# Patient Record
Sex: MALE | Race: WHITE | NOT HISPANIC OR LATINO | URBAN - METROPOLITAN AREA
[De-identification: names, ages, dates, MRNs, and addresses within clinical notes are randomized per-mention and may not be internally consistent; named-entity substitution may affect disease eponyms.]

---

## 2017-12-01 ENCOUNTER — OUTPATIENT (OUTPATIENT)
Dept: OUTPATIENT SERVICES | Facility: HOSPITAL | Age: 47
LOS: 1 days | End: 2017-12-01
Payer: MEDICAID

## 2017-12-01 PROCEDURE — G9001: CPT

## 2017-12-07 ENCOUNTER — EMERGENCY (EMERGENCY)
Facility: HOSPITAL | Age: 47
LOS: 1 days | Discharge: HOME | End: 2017-12-07
Attending: EMERGENCY MEDICINE | Admitting: EMERGENCY MEDICINE
Payer: COMMERCIAL

## 2017-12-07 VITALS
WEIGHT: 205.03 LBS | TEMPERATURE: 97 F | HEART RATE: 64 BPM | RESPIRATION RATE: 12 BRPM | DIASTOLIC BLOOD PRESSURE: 76 MMHG | HEIGHT: 68 IN | SYSTOLIC BLOOD PRESSURE: 121 MMHG | OXYGEN SATURATION: 97 %

## 2017-12-07 LAB
ALBUMIN SERPL ELPH-MCNC: 2.9 G/DL — LOW (ref 3.3–5)
ALP SERPL-CCNC: 133 U/L — HIGH (ref 40–120)
ALT FLD-CCNC: 27 U/L — SIGNIFICANT CHANGE UP (ref 12–78)
ANION GAP SERPL CALC-SCNC: 13 MMOL/L — SIGNIFICANT CHANGE UP (ref 5–17)
APTT BLD: 39.7 SEC — HIGH (ref 27.5–37.4)
AST SERPL-CCNC: 77 U/L — HIGH (ref 15–37)
BASOPHILS # BLD AUTO: 0.2 K/UL — SIGNIFICANT CHANGE UP (ref 0–0.2)
BASOPHILS NFR BLD AUTO: 1.8 % — SIGNIFICANT CHANGE UP (ref 0–2)
BILIRUB SERPL-MCNC: 2.5 MG/DL — HIGH (ref 0.2–1.2)
BUN SERPL-MCNC: 14 MG/DL — SIGNIFICANT CHANGE UP (ref 7–23)
CALCIUM SERPL-MCNC: 8.5 MG/DL — SIGNIFICANT CHANGE UP (ref 8.5–10.1)
CHLORIDE SERPL-SCNC: 98 MMOL/L — SIGNIFICANT CHANGE UP (ref 96–108)
CK MB BLD-MCNC: 1.6 % — SIGNIFICANT CHANGE UP (ref 0–3.5)
CK MB CFR SERPL CALC: 2 NG/ML — SIGNIFICANT CHANGE UP (ref 0–3.6)
CK SERPL-CCNC: 126 U/L — SIGNIFICANT CHANGE UP (ref 26–308)
CO2 SERPL-SCNC: 21 MMOL/L — LOW (ref 22–31)
CREAT SERPL-MCNC: 0.9 MG/DL — SIGNIFICANT CHANGE UP (ref 0.5–1.3)
EOSINOPHIL # BLD AUTO: 0 K/UL — SIGNIFICANT CHANGE UP (ref 0–0.5)
EOSINOPHIL NFR BLD AUTO: 0.4 % — SIGNIFICANT CHANGE UP (ref 0–6)
ETHANOL SERPL-MCNC: 228 MG/DL — HIGH (ref 0–10)
GLUCOSE SERPL-MCNC: 72 MG/DL — SIGNIFICANT CHANGE UP (ref 70–99)
HCT VFR BLD CALC: 44 % — SIGNIFICANT CHANGE UP (ref 39–50)
HGB BLD-MCNC: 14.8 G/DL — SIGNIFICANT CHANGE UP (ref 13–17)
INR BLD: 1.38 RATIO — HIGH (ref 0.88–1.16)
LIDOCAIN IGE QN: 275 U/L — SIGNIFICANT CHANGE UP (ref 73–393)
LYMPHOCYTES # BLD AUTO: 1.9 K/UL — SIGNIFICANT CHANGE UP (ref 1–3.3)
LYMPHOCYTES # BLD AUTO: 18.5 % — SIGNIFICANT CHANGE UP (ref 13–44)
MAGNESIUM SERPL-MCNC: 1.8 MG/DL — SIGNIFICANT CHANGE UP (ref 1.6–2.6)
MCHC RBC-ENTMCNC: 31.8 PG — SIGNIFICANT CHANGE UP (ref 27–34)
MCHC RBC-ENTMCNC: 33.5 GM/DL — SIGNIFICANT CHANGE UP (ref 32–36)
MCV RBC AUTO: 94.8 FL — SIGNIFICANT CHANGE UP (ref 80–100)
MONOCYTES # BLD AUTO: 0.6 K/UL — SIGNIFICANT CHANGE UP (ref 0–0.9)
MONOCYTES NFR BLD AUTO: 5.4 % — SIGNIFICANT CHANGE UP (ref 1–9)
NEUTROPHILS # BLD AUTO: 7.7 K/UL — HIGH (ref 1.8–7.4)
NEUTROPHILS NFR BLD AUTO: 73.9 % — SIGNIFICANT CHANGE UP (ref 43–77)
PLATELET # BLD AUTO: 252 K/UL — SIGNIFICANT CHANGE UP (ref 150–400)
POTASSIUM SERPL-MCNC: 4.7 MMOL/L — SIGNIFICANT CHANGE UP (ref 3.5–5.3)
POTASSIUM SERPL-SCNC: 4.7 MMOL/L — SIGNIFICANT CHANGE UP (ref 3.5–5.3)
PROT SERPL-MCNC: 9.4 G/DL — HIGH (ref 6–8.3)
PROTHROM AB SERPL-ACNC: 15.1 SEC — HIGH (ref 9.8–12.7)
RBC # BLD: 4.65 M/UL — SIGNIFICANT CHANGE UP (ref 4.2–5.8)
RBC # FLD: 13.6 % — SIGNIFICANT CHANGE UP (ref 10.3–14.5)
SODIUM SERPL-SCNC: 132 MMOL/L — LOW (ref 135–145)
TROPONIN I SERPL-MCNC: <.015 NG/ML — SIGNIFICANT CHANGE UP (ref 0.01–0.04)
WBC # BLD: 10.4 K/UL — SIGNIFICANT CHANGE UP (ref 3.8–10.5)
WBC # FLD AUTO: 10.4 K/UL — SIGNIFICANT CHANGE UP (ref 3.8–10.5)

## 2017-12-07 PROCEDURE — 83735 ASSAY OF MAGNESIUM: CPT

## 2017-12-07 PROCEDURE — 82553 CREATINE MB FRACTION: CPT

## 2017-12-07 PROCEDURE — 93005 ELECTROCARDIOGRAM TRACING: CPT

## 2017-12-07 PROCEDURE — 99283 EMERGENCY DEPT VISIT LOW MDM: CPT | Mod: 25

## 2017-12-07 PROCEDURE — 80307 DRUG TEST PRSMV CHEM ANLYZR: CPT

## 2017-12-07 PROCEDURE — 80053 COMPREHEN METABOLIC PANEL: CPT

## 2017-12-07 PROCEDURE — 85027 COMPLETE CBC AUTOMATED: CPT

## 2017-12-07 PROCEDURE — 85610 PROTHROMBIN TIME: CPT

## 2017-12-07 PROCEDURE — 82550 ASSAY OF CK (CPK): CPT

## 2017-12-07 PROCEDURE — 99284 EMERGENCY DEPT VISIT MOD MDM: CPT

## 2017-12-07 PROCEDURE — 71020: CPT | Mod: 26

## 2017-12-07 PROCEDURE — 83690 ASSAY OF LIPASE: CPT

## 2017-12-07 PROCEDURE — 71046 X-RAY EXAM CHEST 2 VIEWS: CPT

## 2017-12-07 PROCEDURE — 84484 ASSAY OF TROPONIN QUANT: CPT

## 2017-12-07 PROCEDURE — 85730 THROMBOPLASTIN TIME PARTIAL: CPT

## 2017-12-07 RX ORDER — SODIUM CHLORIDE 9 MG/ML
1000 INJECTION INTRAMUSCULAR; INTRAVENOUS; SUBCUTANEOUS ONCE
Qty: 0 | Refills: 0 | Status: COMPLETED | OUTPATIENT
Start: 2017-12-07 | End: 2017-12-07

## 2017-12-07 RX ADMIN — SODIUM CHLORIDE 1000 MILLILITER(S): 9 INJECTION INTRAMUSCULAR; INTRAVENOUS; SUBCUTANEOUS at 21:41

## 2017-12-07 NOTE — ED PROVIDER NOTE - CARE PLAN
Principal Discharge DX:	Shortness of breath Principal Discharge DX:	Shortness of breath  Secondary Diagnosis:	Weakness generalized Principal Discharge DX:	Shortness of breath  Secondary Diagnosis:	Weakness generalized  Secondary Diagnosis:	ETOH abuse

## 2017-12-07 NOTE — ED PROVIDER NOTE - OBJECTIVE STATEMENT
46 yo male hx of alcoholic cirrhosis, last drink 11am c/o generalized weakness shortness of breath.  No fever/chills.  No nausea/vomiting/abdominal pain.  No withdrawal symptoms.  PMD Breezy Maza.

## 2017-12-07 NOTE — ED ADULT NURSE NOTE - OBJECTIVE STATEMENT
@2139 Received and assumed care of pt at this time aaox3 in no obvious or acute distress. pt is a 46 y/o  m with pmhx of etoh abuse/dependence ,cirrhosis presents to ed walk in c/o difficulties breathing. pt also reports that he has been binge drinking for the past few weeks stating " I think I may be dehydrated" - last drink at 11am- no signs of withdrawals denies chest pain, dizziness, vomiting, fever, chills, recent travel, sick contacts, calf pain/stiffness or any other distress. seen and evaluated by dr. Cedeno. iv established with labs collected & sent, ivf's infusing, cxr & ekg pending, will continue to monitor

## 2017-12-07 NOTE — ED ADULT NURSE NOTE - PMH
Ascites due to alcoholic cirrhosis    Cirrhosis of liver    Esophageal varices with bleeding  7/2017  ETOH abuse    HTN (hypertension)    IBS (irritable bowel syndrome)

## 2017-12-07 NOTE — ED ADULT TRIAGE NOTE - CHIEF COMPLAINT QUOTE
patient with complain of difficulty breathing and weakness with hx of alcohol abuse, admits to drinking today

## 2017-12-07 NOTE — ED PROVIDER NOTE - PROGRESS NOTE DETAILS
labs and imaging explained to patient, states he feels much better, wants to go home and f/u with PMD.  Asked if he wants to go to rehab for his drinking, patient declined states he will "take care of it himself"

## 2017-12-08 VITALS
TEMPERATURE: 98 F | SYSTOLIC BLOOD PRESSURE: 102 MMHG | OXYGEN SATURATION: 97 % | DIASTOLIC BLOOD PRESSURE: 76 MMHG | HEART RATE: 66 BPM | RESPIRATION RATE: 16 BRPM

## 2017-12-11 DIAGNOSIS — R69 ILLNESS, UNSPECIFIED: ICD-10-CM

## 2018-04-01 ENCOUNTER — OUTPATIENT (OUTPATIENT)
Dept: OUTPATIENT SERVICES | Facility: HOSPITAL | Age: 48
LOS: 1 days | End: 2018-04-01
Payer: MEDICAID

## 2018-04-01 PROCEDURE — G9001: CPT

## 2018-04-05 DIAGNOSIS — R69 ILLNESS, UNSPECIFIED: ICD-10-CM

## 2019-02-22 PROBLEM — K70.31 ALCOHOLIC CIRRHOSIS OF LIVER WITH ASCITES: Chronic | Status: ACTIVE | Noted: 2017-12-07

## 2019-02-22 PROBLEM — K58.9 IRRITABLE BOWEL SYNDROME WITHOUT DIARRHEA: Chronic | Status: ACTIVE | Noted: 2017-12-07

## 2019-02-22 PROBLEM — I85.01 ESOPHAGEAL VARICES WITH BLEEDING: Chronic | Status: ACTIVE | Noted: 2017-12-07

## 2019-02-22 PROBLEM — I10 ESSENTIAL (PRIMARY) HYPERTENSION: Chronic | Status: ACTIVE | Noted: 2017-12-07

## 2019-02-22 PROBLEM — K74.60 UNSPECIFIED CIRRHOSIS OF LIVER: Chronic | Status: ACTIVE | Noted: 2017-12-07

## 2019-02-22 PROBLEM — F10.10 ALCOHOL ABUSE, UNCOMPLICATED: Chronic | Status: ACTIVE | Noted: 2017-12-07

## 2019-02-22 PROBLEM — Z00.00 ENCOUNTER FOR PREVENTIVE HEALTH EXAMINATION: Status: ACTIVE | Noted: 2019-02-22

## 2019-02-26 ENCOUNTER — LABORATORY RESULT (OUTPATIENT)
Age: 49
End: 2019-02-26

## 2019-02-26 ENCOUNTER — APPOINTMENT (OUTPATIENT)
Dept: HEPATOLOGY | Facility: CLINIC | Age: 49
End: 2019-02-26
Payer: MEDICAID

## 2019-02-26 VITALS
HEART RATE: 73 BPM | DIASTOLIC BLOOD PRESSURE: 85 MMHG | WEIGHT: 210 LBS | OXYGEN SATURATION: 94 % | RESPIRATION RATE: 16 BRPM | SYSTOLIC BLOOD PRESSURE: 129 MMHG | BODY MASS INDEX: 33.75 KG/M2 | HEIGHT: 66 IN | TEMPERATURE: 98.2 F

## 2019-02-26 DIAGNOSIS — Z80.1 FAMILY HISTORY OF MALIGNANT NEOPLASM OF TRACHEA, BRONCHUS AND LUNG: ICD-10-CM

## 2019-02-26 DIAGNOSIS — K21.9 GASTRO-ESOPHAGEAL REFLUX DISEASE W/OUT ESOPHAGITIS: ICD-10-CM

## 2019-02-26 DIAGNOSIS — Z82.49 FAMILY HISTORY OF ISCHEMIC HEART DISEASE AND OTHER DISEASES OF THE CIRCULATORY SYSTEM: ICD-10-CM

## 2019-02-26 DIAGNOSIS — Z82.3 FAMILY HISTORY OF STROKE: ICD-10-CM

## 2019-02-26 DIAGNOSIS — Z87.19 PERSONAL HISTORY OF OTHER DISEASES OF THE DIGESTIVE SYSTEM: ICD-10-CM

## 2019-02-26 DIAGNOSIS — I10 ESSENTIAL (PRIMARY) HYPERTENSION: ICD-10-CM

## 2019-02-26 PROCEDURE — 99203 OFFICE O/P NEW LOW 30 MIN: CPT

## 2019-02-26 NOTE — HISTORY OF PRESENT ILLNESS
[de-identified] : Mr. Mancini is a 49 yo  M with decompensated alcoholic cirrhosis, who presents to [facility] liver care. He was first diagnosed with cirrhosis in 1/2017. He was previously followed by gastroenterologist Dr. Nahun Neely, but his insurance no longer covers those visits and he has not been seen by any GI/Liver specialist for >1 year (last visit around 2/2018). His cirrhosis is complicated by a history of esophageal variceal hemorrhage in 7/2017 that required a 2-week ICU stay. After being discharged, he underwent EGD with EVL by Dr. Neely in 8/2017 but has not had another surveillance EGD since then to his recollection. He has not had any further overt GI bleeding since then. His cirrhosis is also complicated by a history of ascites controlled with diuretics. He denies any history of paracentesis or SBP in the past. He has no prior known history of PSE but Dr. Neely started him on Xifaxan in the past. He is not taking lactulose or Miralax. He has no prior known history of PVT or HCC but his last liver imaging was >1 year ago and none is currently available for me to review.\par \par He has a long history of alcohol abuse and dependence. He previously drank a 12-pack of beer daily as well as occasional shots of hard liquor. He reports [place] [facility] [place] well as two prior voluntary outpatient rehab programs (last in 2017). He has also attended AA meetings in the past, last 3 months ago. He reports that he was sober from alcohol for the past 1.5 years (his longest period of sobriety) until early January 2019, when he had an alcohol relapse. The relapse was provoked by financial stressors, particularly he reports that there was concern at the time that he may lose his housing. He currently lives with his sister. He reports that when he relapsed, he stopped taking all of his home medications and was drinking half a 750-mL bottle of vodka daily.\par \par Shortly after he re-started drinking alcohol in 1/2019, his eyes and skin became yellow and he also noted malaise, nausea, fatigue, and abdominal distension/bloating with associated early satiety. His urine also became dark in color. He became worried by these symptoms and quit drinking alcohol 1 week ago. He also re-started his prior home medications at that time. He does feel that his symptoms have all been gradually improving over the last few days since then. He initially was very tremulous when he quit drinking and still has mild tremors in his hands, but feels he is improving overall. He denies any confusion or hallucinations.\par \par No prior colonoscopy. No known FH of liver disease, HCC, or colon cancer/polyps.

## 2019-02-26 NOTE — REVIEW OF SYSTEMS
[Feeling Poorly] : feeling poorly [Feeling Tired] : feeling tired [Heartburn] : heartburn [As Noted in HPI] : as noted in HPI [Negative] : Heme/Lymph [FreeTextEntry3] : yellowing of eyes [FreeTextEntry4] : y [de-identified] : yellowing of skin

## 2019-02-26 NOTE — ASSESSMENT
[FreeTextEntry1] : 47 yo M with alcohol abuse/dependence with recent relapse and now in early remission (reportedly quit 1 week ago), with decompensated alcoholic cirrhosis complicated by ascites and prior esophageal variceal hemorrhage (7/2017), with no prior known history of SBP, PSE, PVT, or HCC.\par \par He had recent onset of jaundice and increased ascites, likely related to alcoholic steatohepatitis from recent alcohol relapse superimposed on his underlying cirrhosis. His symptoms have been gradually improving since he quit drinking 1 week ago and re-started his home medications. Nevertheless, will check labs today to assess current liver function as well as renal function. If his labs are suggestive of severe alcoholic hepatitis then he may benefit from a course of prednisolone therapy to try to help his liver recovery. We also discussed at length the importance of re-establishing and maintaining 100% alcohol sobriety. He was strongly advised to re-start attending AA meetings and also to enroll in another formal rehabilitation program.\par \par # Ascites:  Moderate and non-tense on exam. No signs of infection currently but will follow-up lab results. If considering prednisolone then he would first need diagnostic paracentesis to rule out SBP. Otherwise, if no plans for steroids, then will titrate his diuretic therapy as possible pending his lab results. He was advised to adhere to a <2g Na diet.\par \par # History of secondary esophageal varices with bleeding:  No recent overt bleeding but is overdue for surveillance. Portal hypertension also likely increased by recent relapse. Ordered EGD for surveillance. Meanwhile, will continue secondary prophylaxis with nadolol.\par \par # Possible PSE:\par - Had AMS during his prior hospitalization when he had EVB in 7/2017, but has not had any overt PSE since then.\par - Continue Xifaxan for now; defer lactulose for now.\par \par # HCC surveillance:  Overdue with last imaging >1 year ago and none available for review. Discussed need for q6 month surveillance. Ordered US and AFP today.\par \par # Health maintenance:\par - Mr. AGUILAR was counseled to: abstain from alcohol and all illicit drugs; avoid use of herbal and dietary supplements due to potential hepatotoxicity; limit use of acetaminophen to <2 grams per day; avoid use of nonsteroidal antiinflammatory drugs (NSAIDs) as these can precipitate renal dysfunction in patients with advanced liver disease; avoid eating any unpasteurized dairy products; and avoid eating raw/steamed oysters or other shellfish to avoid risk of Vibrio infection.\par \par He will return to clinic for follow-up in 1 month.

## 2019-02-28 LAB
AFP-TM SERPL-MCNC: 5 NG/ML
ALBUMIN SERPL ELPH-MCNC: 2.5 G/DL
ALP BLD-CCNC: 130 U/L
ALT SERPL-CCNC: 41 U/L
ANION GAP SERPL CALC-SCNC: 17 MMOL/L
AST SERPL-CCNC: 164 U/L
BASOPHILS # BLD AUTO: 0.1 K/UL
BASOPHILS NFR BLD AUTO: 1 %
BILIRUB DIRECT SERPL-MCNC: 7.7 MG/DL
BILIRUB SERPL-MCNC: 13 MG/DL
BUN SERPL-MCNC: 8 MG/DL
CALCIUM SERPL-MCNC: 8.2 MG/DL
CHLORIDE SERPL-SCNC: 98 MMOL/L
CO2 SERPL-SCNC: 24 MMOL/L
CREAT SERPL-MCNC: 0.61 MG/DL
EOSINOPHIL # BLD AUTO: 0.16 K/UL
EOSINOPHIL NFR BLD AUTO: 1.6 %
GLUCOSE SERPL-MCNC: 102 MG/DL
HBV CORE IGG+IGM SER QL: NONREACTIVE
HBV SURFACE AB SERPL IA-ACNC: <3 MIU/ML
HBV SURFACE AG SER QL: NONREACTIVE
HCT VFR BLD CALC: 36 %
HCV AB SER QL: NONREACTIVE
HCV S/CO RATIO: 0.22 S/CO
HEPATITIS A IGG ANTIBODY: REACTIVE
HGB BLD-MCNC: 12.2 G/DL
IMM GRANULOCYTES NFR BLD AUTO: 0.8 %
INR PPP: 2.03 RATIO
LYMPHOCYTES # BLD AUTO: 1.6 K/UL
LYMPHOCYTES NFR BLD AUTO: 16.3 %
MAN DIFF?: NORMAL
MCHC RBC-ENTMCNC: 33.9 GM/DL
MCHC RBC-ENTMCNC: 36.7 PG
MCV RBC AUTO: 108.4 FL
MONOCYTES # BLD AUTO: 1.12 K/UL
MONOCYTES NFR BLD AUTO: 11.4 %
NEUTROPHILS # BLD AUTO: 6.74 K/UL
NEUTROPHILS NFR BLD AUTO: 68.9 %
PLATELET # BLD AUTO: NORMAL K/UL
POTASSIUM SERPL-SCNC: 3.3 MMOL/L
PROT SERPL-MCNC: 8.3 G/DL
PT BLD: 23.6 SEC
RBC # BLD: 3.32 M/UL
RBC # FLD: 15.8 %
SODIUM SERPL-SCNC: 139 MMOL/L
WBC # FLD AUTO: 9.8 K/UL

## 2019-03-07 ENCOUNTER — RESULT REVIEW (OUTPATIENT)
Age: 49
End: 2019-03-07

## 2019-03-07 ENCOUNTER — OUTPATIENT (OUTPATIENT)
Dept: OUTPATIENT SERVICES | Facility: HOSPITAL | Age: 49
LOS: 1 days | End: 2019-03-07
Payer: MEDICAID

## 2019-03-07 DIAGNOSIS — K70.31 ALCOHOLIC CIRRHOSIS OF LIVER WITH ASCITES: ICD-10-CM

## 2019-03-07 LAB
ALBUMIN FLD-MCNC: 0.3 G/DL — SIGNIFICANT CHANGE UP
B PERT IGG+IGM PNL SER: ABNORMAL
COLOR FLD: YELLOW — SIGNIFICANT CHANGE UP
FLUID INTAKE SUBSTANCE CLASS: SIGNIFICANT CHANGE UP
FLUID SEGMENTED GRANULOCYTES: 1 % — SIGNIFICANT CHANGE UP
GLUCOSE FLD-MCNC: 97 MG/DL — SIGNIFICANT CHANGE UP
LDH SERPL L TO P-CCNC: 49 U/L — SIGNIFICANT CHANGE UP
LYMPHOCYTES # FLD: 73 % — SIGNIFICANT CHANGE UP
MESOTHL CELL # FLD: 18 % — SIGNIFICANT CHANGE UP
MONOS+MACROS # FLD: 8 % — SIGNIFICANT CHANGE UP
PROT FLD-MCNC: 1.1 G/DL — SIGNIFICANT CHANGE UP
RCV VOL RI: 530 /UL — HIGH (ref 0–5)
TOTAL NUCLEATED CELL COUNT, BODY FLUID: 21 /UL — HIGH (ref 0–5)
TUBE TYPE: SIGNIFICANT CHANGE UP

## 2019-03-07 PROCEDURE — 88313 SPECIAL STAINS GROUP 2: CPT

## 2019-03-07 PROCEDURE — 87205 SMEAR GRAM STAIN: CPT

## 2019-03-07 PROCEDURE — 87070 CULTURE OTHR SPECIMN AEROBIC: CPT

## 2019-03-07 PROCEDURE — 88112 CYTOPATH CELL ENHANCE TECH: CPT | Mod: 26

## 2019-03-07 PROCEDURE — 75970 VASCULAR BIOPSY: CPT

## 2019-03-07 PROCEDURE — C1887: CPT

## 2019-03-07 PROCEDURE — 37200 TRANSCATHETER BIOPSY: CPT

## 2019-03-07 PROCEDURE — 85027 COMPLETE CBC AUTOMATED: CPT

## 2019-03-07 PROCEDURE — C1894: CPT

## 2019-03-07 PROCEDURE — 87116 MYCOBACTERIA CULTURE: CPT

## 2019-03-07 PROCEDURE — 83615 LACTATE (LD) (LDH) ENZYME: CPT

## 2019-03-07 PROCEDURE — 88313 SPECIAL STAINS GROUP 2: CPT | Mod: 26

## 2019-03-07 PROCEDURE — C1729: CPT

## 2019-03-07 PROCEDURE — 89051 BODY FLUID CELL COUNT: CPT

## 2019-03-07 PROCEDURE — 88305 TISSUE EXAM BY PATHOLOGIST: CPT

## 2019-03-07 PROCEDURE — 87102 FUNGUS ISOLATION CULTURE: CPT

## 2019-03-07 PROCEDURE — 82945 GLUCOSE OTHER FLUID: CPT

## 2019-03-07 PROCEDURE — 87015 SPECIMEN INFECT AGNT CONCNTJ: CPT

## 2019-03-07 PROCEDURE — 84157 ASSAY OF PROTEIN OTHER: CPT

## 2019-03-07 PROCEDURE — 82042 OTHER SOURCE ALBUMIN QUAN EA: CPT

## 2019-03-07 PROCEDURE — 87075 CULTR BACTERIA EXCEPT BLOOD: CPT

## 2019-03-07 PROCEDURE — 76937 US GUIDE VASCULAR ACCESS: CPT

## 2019-03-07 PROCEDURE — 75970 VASCULAR BIOPSY: CPT | Mod: 26

## 2019-03-07 PROCEDURE — 87206 SMEAR FLUORESCENT/ACID STAI: CPT

## 2019-03-07 PROCEDURE — 36011 PLACE CATHETER IN VEIN: CPT

## 2019-03-07 PROCEDURE — 88305 TISSUE EXAM BY PATHOLOGIST: CPT | Mod: 26

## 2019-03-07 PROCEDURE — C1769: CPT

## 2019-03-07 PROCEDURE — 88112 CYTOPATH CELL ENHANCE TECH: CPT

## 2019-03-07 PROCEDURE — P9047: CPT

## 2019-03-07 PROCEDURE — 88342 IMHCHEM/IMCYTCHM 1ST ANTB: CPT

## 2019-03-07 PROCEDURE — 88341 IMHCHEM/IMCYTCHM EA ADD ANTB: CPT | Mod: 26

## 2019-03-07 PROCEDURE — 49083 ABD PARACENTESIS W/IMAGING: CPT

## 2019-03-07 PROCEDURE — 88307 TISSUE EXAM BY PATHOLOGIST: CPT

## 2019-03-07 PROCEDURE — 88307 TISSUE EXAM BY PATHOLOGIST: CPT | Mod: 26

## 2019-03-07 PROCEDURE — 88342 IMHCHEM/IMCYTCHM 1ST ANTB: CPT | Mod: 26

## 2019-03-07 PROCEDURE — 36011 PLACE CATHETER IN VEIN: CPT | Mod: RT

## 2019-03-07 PROCEDURE — 88341 IMHCHEM/IMCYTCHM EA ADD ANTB: CPT

## 2019-03-07 PROCEDURE — 76937 US GUIDE VASCULAR ACCESS: CPT | Mod: 26

## 2019-03-08 LAB
GRAM STN FLD: SIGNIFICANT CHANGE UP
NIGHT BLUE STAIN TISS: SIGNIFICANT CHANGE UP
SPECIMEN SOURCE: SIGNIFICANT CHANGE UP
SPECIMEN SOURCE: SIGNIFICANT CHANGE UP
SURGICAL PATHOLOGY STUDY: SIGNIFICANT CHANGE UP

## 2019-03-12 LAB
CULTURE RESULTS: SIGNIFICANT CHANGE UP
SPECIMEN SOURCE: SIGNIFICANT CHANGE UP

## 2019-03-13 DIAGNOSIS — K74.60 UNSPECIFIED CIRRHOSIS OF LIVER: ICD-10-CM

## 2019-03-13 DIAGNOSIS — R18.8 OTHER ASCITES: ICD-10-CM

## 2019-03-14 ENCOUNTER — LABORATORY RESULT (OUTPATIENT)
Age: 49
End: 2019-03-14

## 2019-03-14 ENCOUNTER — APPOINTMENT (OUTPATIENT)
Dept: HEPATOLOGY | Facility: CLINIC | Age: 49
End: 2019-03-14
Payer: MEDICAID

## 2019-03-14 VITALS
SYSTOLIC BLOOD PRESSURE: 122 MMHG | HEART RATE: 61 BPM | WEIGHT: 215 LBS | RESPIRATION RATE: 16 BRPM | DIASTOLIC BLOOD PRESSURE: 78 MMHG | HEIGHT: 66 IN | BODY MASS INDEX: 34.55 KG/M2 | TEMPERATURE: 97.6 F

## 2019-03-14 PROCEDURE — 99214 OFFICE O/P EST MOD 30 MIN: CPT

## 2019-03-14 RX ORDER — AZITHROMYCIN 250 MG/1
250 TABLET, FILM COATED ORAL
Qty: 6 | Refills: 0 | Status: DISCONTINUED | COMMUNITY
Start: 2019-01-22

## 2019-03-14 RX ORDER — VALACYCLOVIR 500 MG/1
500 TABLET, FILM COATED ORAL
Qty: 6 | Refills: 0 | Status: DISCONTINUED | COMMUNITY
Start: 2018-11-15

## 2019-03-14 RX ORDER — HYDROCORTISONE 25 MG/G
2.5 CREAM TOPICAL
Qty: 28 | Refills: 0 | Status: DISCONTINUED | COMMUNITY
Start: 2018-11-15

## 2019-03-14 NOTE — ASSESSMENT
[FreeTextEntry1] : Assessment and Plan: Mr. Mancnii is a 48 year old  male with history of who presents to the hepatology clinic for follow up.\par \par 1. Decompensated alcoholic cirrhosis complicated by ascites and variceal bleed (last 7/2017)\par Patient reports he is doing well today. He underwent transjugular liver biopsy yesterday and paracentesis (with 2.5L ascites removed) on 03/07/19. Ascitic fluid analyses showed no evidence of SBP, with high SAAG (ascitic fluid albumin 0.3) and low protein (ascitic fluid protein 1.1). 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 Liver tests from 02/26/19 concerning for possible severe alcoholic hepatitis, vs progression/decompensation of alcoholic cirrhosis. No marked leukocytosis to help discriminate between these two possibilities. Calculated MDF is >60 so he was started on prednisolone on 03/10/19 and he reports he has been compliant on it. We will repeat labs today. Will consider discontinuing prednisolone if there no improvement with his bilirubin. \par \par He was counseled to: abstain from alcohol and all illicit drugs; avoid use of herbal and dietary supplements due to potential hepatotoxicity; limit use of acetaminophen to <2 grams per day; avoid use of nonsteroidal antiinflammatory drugs (NSAIDs) as these can precipitate renal dysfunction in patients with advanced liver disease; avoid eating any unpasteurized dairy products; and avoid eating raw/steamed oysters or other shellfish to avoid risk of Vibrio infection.\par \par Fluid Overload:  Abdomen with moderate ascites and non-tense, and +2 bilateral lower extremity edema on exam. Will repeat labs today and adjust diuretics accordingly. He was advised to adhere to a <2g Na diet. He was also started on ciprofloxacin 250 mg po daily for primary SBP prophylaxis given low protein ascites. This will be continued at least until he is off steroid therapy and his liver function improves. \par \par Hepatic Encephalopathy: He had AMS during his prior hospitalization when he had EVB in 7/2017, but has not had any overt PSE since then. Continue Xifaxan for now; defer lactulose for now.\par \par Esophageal Variceal Screen: He has a history of secondary esophageal varices with bleeding:  No recent overt bleeding but is overdue for surveillance. Portal hypertension also likely increased by recent relapse. He is scheduled for EGD on 03/18/19. Will continue secondary prophylaxis with nadolol.\par \par HCC surveillance:  Overdue with last imaging >1 year ago and none available for review. He is scheduled for a US on 03/19/19. Last AFP from 02/27/19 normal (5.0).\par \par 2. Alcohol Abuse \par He reports today he quit drinking in January 2019. We discussed at length the importance of re-establishing and maintaining 100% alcohol sobriety. He was strongly advised to re-start attending AA meetings and also to enroll in another formal rehabilitation program at his last visit but has not done so. He reports he will look into reengaging with his counselor he was seeing in 2018. He reports his sister and family are supportive.\par \par 3. Temporary Vision Loss \par Two days ago he experienced a loss of vision in his left eye for about 2 minutes and his vision returned and he had no reoccurring symptoms since then.He has an appointment with ophthalmologist at 1pm and his symptoms did not reoccur.\par \par 4. Health Maintenance \par Immunizations: Immune HAV, Susceptible to HBV- he will check with his insurance regarding coverage \par Colonoscopy: will start screenings at 49 years of age\par \par Follow Up: 03/27/19 with \par \par Rad Rao\par Nurse Practitioner \par Hepatology\par [facility] \par [address]\[place] [address]\par Tel: [phone]\par

## 2019-03-14 NOTE — PHYSICAL EXAM
[Scleral Icterus] : Scleral Icterus noted [Spider Angioma] : Spider angioma(s) was(were) observed [Abdominal  Ascites] : ascites [Ascites Fluid Wave] : positive ascites fluid wave [Jaundice] : Jaundice [General Appearance - Alert] : alert [Respiration, Rhythm And Depth] : normal respiratory rhythm and effort [Scattered Wheezes] : scattered wheezing [Heart Rate And Rhythm] : heart rate was normal and rhythm regular [___ +] : bilateral [unfilled]+ pitting edema to the ankles [Abdomen Soft] : soft [Abdomen Tenderness] : non-tender [Abnormal Walk] : normal gait [Oriented To Time, Place, And Person] : oriented to person, place, and time [Asterixis] : no asterixis observed [Depression] : no depression [Hallucinations] : ~T no ~M hallucinations [Delusions] : no ~T delusions [Suicidal Ideation] : no suicidal ideation [Homicidal Ideation] : no homicidal ideations [Preoccupiation With Violent Thoughts] : no preoccupation with violent thoughts [FreeTextEntry1] : umbilical hernia

## 2019-03-14 NOTE — HISTORY OF PRESENT ILLNESS
[Unchanged] : unchanged [de-identified] : Mr. Mancini is a 48 year old  male with history of decompensated alcoholic cirrhosis complicated by ascites and variceal bleed (last 7/2017) who presents to the hepatology clinic for follow up. He was first diagnosed with cirrhosis in 1/2017. He denies any family history of liver disease, HCC, or colon cancer/polyps. He was previously followed by gastroenterologist Dr. Nahun Neely, but his insurance no longer covers those visits and he has not been seen by any GI/Liver specialist for >1 year (last visit around 2/2018). His cirrhosis is complicated by a history of esophageal variceal hemorrhage in 7/2017 that required a 2-week ICU stay. After being discharged, he underwent EGD with EVL by Dr. Neely in 8/2017 but has not had another surveillance EGD since then to his recollection. He has not had any further overt GI bleeding since then. His cirrhosis is also complicated by a history of ascites controlled with diuretics. He denies any history of paracentesis or SBP in the past. He has no prior known history of PSE but Dr. Neely started him on Xifaxan in the past. He is not taking lactulose or Miralax. He has no prior known history of PVT or HCC but his last liver imaging was >1 year ago and none is currently available for to review.\par \par He has a long history of alcohol abuse and dependence. He previously drank a 12-pack of beer daily as well as occasional shots of hard liquor. He reports [place] [facility] [place] as well as two prior voluntary outpatient rehab programs (last in 2017). He had 3 DUIs in the past (last 2012). He has also attended AA meetings in the past, last 3 months ago. He reports that he was sober from alcohol for the past 1.5 years (his longest period of sobriety) until January 2019, when he had an alcohol relapse. The relapse was provoked by financial stressors, particularly he reports that there was concern at the time that he may lose his housing. He currently lives with his sister. He reports that when he relapsed, he stopped taking all of his home medications and was drinking half a 750-mL bottle of vodka daily. \par \par He had a transjugular liver biopsy yesterday and paracentesis (with 2.5L ascites removed) on 03/07/19. Ascitic fluid analyses showed no evidence of SBP, with high SAAG (ascitic fluid albumin 0.3) and low protein (ascitic fluid protein 1.1). 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 He was started on prednisolone for severe alcohol hepatitis on 03/08/19.\par \par Since his last visit on 02/26/19, he reports has been well and his last drink was in January 2019. Two days ago he experienced a loss of vision in his left eye for about 2 minutes and his vision returned and he had no reoccurring symptoms since then. He is scheduled to see an ophthalmologist this afternoon. He reports his ankle edema is improving and his ascites is about the same as last visit. He has not restarted AA or SA program since his last visit. He reports he was just scared and so held off. He started prednisolone on Long 03/10/19 and has been tolerating well. He denies any signs or symptoms of GI bleed, hepatic encephalopathy, fever, or chills. He smokes 1/2 ppd of cigarettes (for 25 years). \par

## 2019-03-15 LAB
ALBUMIN SERPL ELPH-MCNC: 2.8 G/DL
ALP BLD-CCNC: 134 U/L
ALT SERPL-CCNC: 43 U/L
ANION GAP SERPL CALC-SCNC: 13 MMOL/L
AST SERPL-CCNC: 100 U/L
BASOPHILS # BLD AUTO: 0.04 K/UL
BASOPHILS NFR BLD AUTO: 0.5 %
BILIRUB SERPL-MCNC: 7.9 MG/DL
BUN SERPL-MCNC: 15 MG/DL
CALCIUM SERPL-MCNC: 8.5 MG/DL
CHLORIDE SERPL-SCNC: 100 MMOL/L
CO2 SERPL-SCNC: 24 MMOL/L
CREAT SERPL-MCNC: 0.7 MG/DL
EOSINOPHIL # BLD AUTO: 0.04 K/UL
EOSINOPHIL NFR BLD AUTO: 0.5 %
GLUCOSE SERPL-MCNC: 129 MG/DL
HCT VFR BLD CALC: 38.5 %
HGB BLD-MCNC: 12.6 G/DL
IMM GRANULOCYTES NFR BLD AUTO: 3.6 %
INR PPP: 1.74 RATIO
LYMPHOCYTES # BLD AUTO: 1 K/UL
LYMPHOCYTES NFR BLD AUTO: 13.2 %
MAN DIFF?: NORMAL
MCHC RBC-ENTMCNC: 32.7 GM/DL
MCHC RBC-ENTMCNC: 36.5 PG
MCV RBC AUTO: 111.6 FL
MONOCYTES # BLD AUTO: 1.08 K/UL
MONOCYTES NFR BLD AUTO: 14.2 %
NEUTROPHILS # BLD AUTO: 5.15 K/UL
NEUTROPHILS NFR BLD AUTO: 68 %
PLATELET # BLD AUTO: NORMAL K/UL
POTASSIUM SERPL-SCNC: 4.1 MMOL/L
PROT SERPL-MCNC: 8 G/DL
PT BLD: 20 SEC
RBC # BLD: 3.45 M/UL
RBC # FLD: 15 %
SODIUM SERPL-SCNC: 137 MMOL/L
WBC # FLD AUTO: 7.58 K/UL

## 2019-03-18 ENCOUNTER — APPOINTMENT (OUTPATIENT)
Dept: HEPATOLOGY | Facility: HOSPITAL | Age: 49
End: 2019-03-18

## 2019-03-18 ENCOUNTER — OUTPATIENT (OUTPATIENT)
Dept: OUTPATIENT SERVICES | Facility: HOSPITAL | Age: 49
LOS: 1 days | Discharge: HOME | End: 2019-03-18
Payer: MEDICAID

## 2019-03-18 DIAGNOSIS — K70.31 ALCOHOLIC CIRRHOSIS OF LIVER WITH ASCITES: ICD-10-CM

## 2019-03-18 PROCEDURE — 43235 EGD DIAGNOSTIC BRUSH WASH: CPT

## 2019-03-19 ENCOUNTER — APPOINTMENT (OUTPATIENT)
Dept: ULTRASOUND IMAGING | Facility: CLINIC | Age: 49
End: 2019-03-19
Payer: MEDICAID

## 2019-03-19 ENCOUNTER — OUTPATIENT (OUTPATIENT)
Dept: OUTPATIENT SERVICES | Facility: HOSPITAL | Age: 49
LOS: 1 days | End: 2019-03-19
Payer: COMMERCIAL

## 2019-03-19 DIAGNOSIS — I85.11 SECONDARY ESOPHAGEAL VARICES WITH BLEEDING: ICD-10-CM

## 2019-03-19 PROCEDURE — 76700 US EXAM ABDOM COMPLETE: CPT | Mod: 26

## 2019-03-19 PROCEDURE — 76700 US EXAM ABDOM COMPLETE: CPT

## 2019-03-27 ENCOUNTER — APPOINTMENT (OUTPATIENT)
Dept: HEPATOLOGY | Facility: CLINIC | Age: 49
End: 2019-03-27

## 2019-04-05 ENCOUNTER — LABORATORY RESULT (OUTPATIENT)
Age: 49
End: 2019-04-05

## 2019-04-05 ENCOUNTER — APPOINTMENT (OUTPATIENT)
Dept: HEPATOLOGY | Facility: CLINIC | Age: 49
End: 2019-04-05
Payer: MEDICAID

## 2019-04-05 VITALS
HEIGHT: 66 IN | BODY MASS INDEX: 34.39 KG/M2 | HEART RATE: 67 BPM | WEIGHT: 214 LBS | TEMPERATURE: 97.5 F | RESPIRATION RATE: 16 BRPM | DIASTOLIC BLOOD PRESSURE: 78 MMHG | SYSTOLIC BLOOD PRESSURE: 119 MMHG

## 2019-04-05 DIAGNOSIS — F17.210 NICOTINE DEPENDENCE, CIGARETTES, UNCOMPLICATED: ICD-10-CM

## 2019-04-05 DIAGNOSIS — D53.9 NUTRITIONAL ANEMIA, UNSPECIFIED: ICD-10-CM

## 2019-04-05 PROCEDURE — 99214 OFFICE O/P EST MOD 30 MIN: CPT

## 2019-04-05 NOTE — ASSESSMENT
[FreeTextEntry1] : 47 yo M with alcohol abuse/dependence with recent relapse and now in early remission (sober since 2/19/19), with alcoholic hepatitis and decompensated alcoholic cirrhosis complicated by ascites, peripheral edema, and prior esophageal variceal hemorrhage (7/2017), with no history of SBP, PSE, PVT, or HCC.\par \par # Alcoholic hepatitis:\par - Biopsy proven, and Lille responder after prednisolone started.\par - Will complete 28-day course of prednisolone (3/10/19-4/7/19).\par - Re-check labs today.\par - Monitor closely for infections. Advised to immediately call or seek medical attention if he develops any symptoms/signs of infection.\par \par # Ascites and peripheral edema:\par - Increased compared to last visit.\par - Re-check labs today and if electrolytes/renal function stable then will increase diuretics (currently on furosemide 20 mg po daily and spironolactone 150 mg po daily).\par - Continue ciprofloxacin 250 mg po daily for SBP prophylaxis given low protein ascites.\par - Mr. AGUILAR was counseled to adhere to a low sodium (<2 grams of sodium per day) diet by: avoiding adding any salt to meals (removing the salt shaker from the table); eliminating salty foods from his diet; eating more home-cooked meals; choosing fresh or frozen, not canned, vegetables and fruits; and reading ingredient and food labels to choose low sodium foods.\par \par # History of secondary esophageal varices with bleeding:\par - No recent overt bleeding.\par - Last EGD on 3/18/19 with small EV, PHG, and duodenopathy.\par - Repeat EGD in 1 year (3/2020).\par - Continue nadolol for secondary prophylaxis.\par \par # Possible PSE:\par - Had AMS during his prior hospitalization when he had EVB in 7/2017, but has not had any overt PSE since then.\par - Continue Xifaxan for now; defer lactulose for now.\par \par # HCC surveillance:\par - No evidence of HCC or PVT on US on 3/19/19.\par - Continue q6 month surveillance, next due in 9/2019.\par \par # Decompensated ALD cirrhosis:\par - Child-Lemus class C, with MELD-Na 22 based on last labs.\par - Discussed with patient that his liver function may continue to improve partially over the next few months with continued alcohol sobriety, but it is unclear to what extent.\par - He was encouraged to continue AA attendance but was again recommended to enroll in a formal outpatient rehab program as well. He was explicitly told enrollment and completion of a rehab program would be one condition for him to be considered as a candidate for liver transplant in the future.\par \par # Macrocytic anemia:\par - Most likely related to ALD cirrhosis, but will rule out FA or B12 deficiency or thyroid disorder with labs.\par \par # Health maintenance:\par - Mr. AGUILAR was counseled to: abstain from alcohol and all illicit drugs; avoid use of herbal and dietary supplements due to potential hepatotoxicity; limit use of acetaminophen to <2 grams per day; avoid use of nonsteroidal antiinflammatory drugs (NSAIDs) as these can precipitate renal dysfunction in patients with advanced liver disease; avoid eating any unpasteurized dairy products; and avoid eating raw/steamed oysters or other shellfish to avoid risk of Vibrio infection.\par - Immune to HAV. Non-immune to HBV, will start vaccine series.\par \par He will return to clinic for follow-up in 1 month.

## 2019-04-05 NOTE — CONSULT LETTER
[Dear  ___] : Dear  [unfilled], [Courtesy Letter:] : I had the pleasure of seeing your patient, [unfilled], in my office today. [Please see my note below.] : Please see my note below. [FreeTextEntry2] : Dr. Breezy Maza [FreeTextEntry3] : Sincerely,\par \par Edy Drew M.D., Ph.D.\par [facility]\par [address]\[place] [address]\par Tel: [phone]\par Fax: [phone]\par Cell: [phone]\par E-mail: [email or web address]\par

## 2019-04-05 NOTE — HISTORY OF PRESENT ILLNESS
[de-identified] : Mr. Mancini is a 49 yo  M with alcoholic hepatitis and decompensated alcoholic cirrhosis complicated by ascites and a history of esophageal variceal hemorrhage, who is here for follow-up.\par \par He was first diagnosed with cirrhosis in 1/2017. He was previously followed by gastroenterologist Dr. Nahun Neely, then was lost to follow-up for >1 year (from 2/2018 until he established care with us in 2/2019). His cirrhosis is complicated by a history of esophageal variceal hemorrhage in 7/2017 that required a 2-week ICU stay. After being discharged, he underwent EGD with EVL by Dr. Neely in 8/2017. More recently, EGD here on 3/18/19 showed small EV, PHG, and duodenopathy. He is on nadolol for secondary prophylaxis. His cirrhosis is also complicated by a history of ascites that was previously controlled with diuretics.\par \par After a recent alcohol relapse, his ascites worsened (requiring LVP on 3/7/19) and with worsened jaundice/liver tests. He underwent liver biopsy on 3/7/19 that showed alcoholic hepatitis in addition to cirrhosis. He was therefore started on prednisolone therapy (3/10/19- ). He was a Lille responder and will soon complete the 28-day course. He was also concurrently started on ciprofloxacin for SBP prophylaxis. His spironolactone was increased from 100 mg/day to 150 mg/day at his last clinic visit, though he remains on furosemide 20 mg/day.\par \par He does not have any clear prior history of PSE, but remains on Xifaxan which was started by Dr. Neely in the past. He is not taking lactulose or Miralax. He has no prior known history of SBP, PVT, or HCC. No prior colonoscopy.\par \par Alcohol History:  He has a long history of alcohol abuse and dependence. He previously drank a 12-pack of beer daily as well as occasional shots of hard liquor. He reports completing a voluntary inpatient rehab program at [facility] [place] in 2014, as well as two prior voluntary outpatient rehab programs (last in 2017). He has also attended AA meetings in the past, last 3 months ago. He reports that he was sober from alcohol for the past 1.5 years (his longest period of sobriety) until early January 2019, when he had an alcohol relapse. The relapse was provoked by financial stressors, particularly he reports that there was concern at the time that he may lose his housing. He currently lives with his sister. He reports that when he relapsed, he stopped taking all of his home medications and was drinking half a 750-mL bottle of vodka daily. He quit drinking alcohol again on 2/19/19 (1 week prior to when he first established care in our Liver clinic). He currently reports that he has been attending AA 3 times/week, but has not re-enrolled in an outpatient rehab program yet. He denies any recent alcohol cravings or slips.\par \par He works as an .\par \par He continues to smoke cigarettes but is cutting down with the goal of eventually quitting altogether. He declined offered smoking cessation resources/aids when discussed today.\par \par Today, he reports that his weights have been stable at home, but he has had some mild abdominal distension as well as lower extremity swelling. He is otherwise feeling well. He denies any confusion, overt GI bleeding, fevers/chills, nausea/vomiting, dysuria, cough, or diarrhea.

## 2019-04-06 LAB
CULTURE RESULTS: SIGNIFICANT CHANGE UP
SPECIMEN SOURCE: SIGNIFICANT CHANGE UP

## 2019-04-11 LAB
25(OH)D3 SERPL-MCNC: 6.3 NG/ML
ALBUMIN SERPL ELPH-MCNC: 2.8 G/DL
ALP BLD-CCNC: 116 U/L
ALT SERPL-CCNC: 48 U/L
ANION GAP SERPL CALC-SCNC: 11 MMOL/L
AST SERPL-CCNC: 96 U/L
BASOPHILS # BLD AUTO: 0.05 K/UL
BASOPHILS NFR BLD AUTO: 0.5 %
BILIRUB DIRECT SERPL-MCNC: 4.7 MG/DL
BILIRUB SERPL-MCNC: 7.9 MG/DL
BUN SERPL-MCNC: 12 MG/DL
CALCIUM SERPL-MCNC: 8.3 MG/DL
CHLORIDE SERPL-SCNC: 97 MMOL/L
CO2 SERPL-SCNC: 27 MMOL/L
CREAT SERPL-MCNC: 0.63 MG/DL
EOSINOPHIL # BLD AUTO: 0.12 K/UL
EOSINOPHIL NFR BLD AUTO: 1.2 %
ETHANOL BLD-MCNC: <10 MG/DL
FOLATE SERPL-MCNC: 17.3 NG/ML
GLUCOSE SERPL-MCNC: 113 MG/DL
HCT VFR BLD CALC: 40.5 %
HGB BLD-MCNC: 13.5 G/DL
IMM GRANULOCYTES NFR BLD AUTO: 1.7 %
INR PPP: 1.69 RATIO
LYMPHOCYTES # BLD AUTO: 1.5 K/UL
LYMPHOCYTES NFR BLD AUTO: 15 %
MAGNESIUM SERPL-MCNC: 1.7 MG/DL
MAN DIFF?: NORMAL
MCHC RBC-ENTMCNC: 33.3 GM/DL
MCHC RBC-ENTMCNC: 36.9 PG
MCV RBC AUTO: 110.7 FL
MONOCYTES # BLD AUTO: 1.14 K/UL
MONOCYTES NFR BLD AUTO: 11.4 %
NEUTROPHILS # BLD AUTO: 7.02 K/UL
NEUTROPHILS NFR BLD AUTO: 70.2 %
PHOSPHATE SERPL-MCNC: 2.9 MG/DL
PLATELET # BLD AUTO: NORMAL K/UL
POTASSIUM SERPL-SCNC: 3.8 MMOL/L
PROT SERPL-MCNC: 7.5 G/DL
PT BLD: 19.8 SEC
RBC # BLD: 3.66 M/UL
RBC # FLD: 14.6 %
SODIUM SERPL-SCNC: 135 MMOL/L
TSH SERPL-ACNC: 2.58 UIU/ML
VIT B1 SERPL-MCNC: 113.7 NMOL/L
VIT B12 SERPL-MCNC: 1640 PG/ML
WBC # FLD AUTO: 10 K/UL

## 2019-04-12 ENCOUNTER — RX RENEWAL (OUTPATIENT)
Age: 49
End: 2019-04-12

## 2019-04-16 LAB
ALCOHOL BIOMARKERS QUANT, URINE: NORMAL NG/ML
ETHYL SULFATE: NORMAL NG/ML
ZZALCOHOL BIOMARKERS QUANT UR: NEGATIVE

## 2019-04-27 LAB
CULTURE RESULTS: SIGNIFICANT CHANGE UP
SPECIMEN SOURCE: SIGNIFICANT CHANGE UP

## 2019-05-08 ENCOUNTER — APPOINTMENT (OUTPATIENT)
Age: 49
End: 2019-05-08
Payer: MEDICAID

## 2019-05-08 ENCOUNTER — LABORATORY RESULT (OUTPATIENT)
Age: 49
End: 2019-05-08

## 2019-05-08 VITALS
WEIGHT: 191 LBS | DIASTOLIC BLOOD PRESSURE: 75 MMHG | SYSTOLIC BLOOD PRESSURE: 113 MMHG | RESPIRATION RATE: 16 BRPM | HEIGHT: 66 IN | HEART RATE: 64 BPM | TEMPERATURE: 97.5 F | BODY MASS INDEX: 30.7 KG/M2

## 2019-05-08 PROCEDURE — ZZZZZ: CPT

## 2019-05-08 PROCEDURE — 90739 HEPB VACC 2/4 DOSE ADULT IM: CPT

## 2019-05-08 PROCEDURE — 99214 OFFICE O/P EST MOD 30 MIN: CPT | Mod: 25

## 2019-05-08 PROCEDURE — 90471 IMMUNIZATION ADMIN: CPT

## 2019-05-08 RX ORDER — PREDNISOLONE SODIUM PHOSPHATE 10 MG/5ML
10 SOLUTION ORAL DAILY
Qty: 360 | Refills: 0 | Status: DISCONTINUED | COMMUNITY
Start: 2019-03-08 | End: 2019-05-08

## 2019-05-08 RX ORDER — THIAMINE HCL 50 MG
50 TABLET ORAL
Qty: 30 | Refills: 0 | Status: DISCONTINUED | COMMUNITY
Start: 2018-10-10 | End: 2019-05-08

## 2019-05-08 RX ORDER — CIPROFLOXACIN HYDROCHLORIDE 250 MG/1
250 TABLET, FILM COATED ORAL DAILY
Qty: 28 | Refills: 0 | Status: DISCONTINUED | COMMUNITY
Start: 2019-03-08 | End: 2019-05-08

## 2019-05-08 NOTE — ASSESSMENT
[FreeTextEntry1] : 49 yo M with alcohol abuse/dependence with recent relapse and now in early remission (sober since 2/19/19), with biopsy-proven alcoholic hepatitis (3/2019) and decompensated alcoholic cirrhosis complicated by ascites, peripheral edema, and prior esophageal variceal hemorrhage (7/2017), with no history of SBP, PSE, PVT, or HCC.\par \par # Alcoholic hepatitis:\par - Biopsy proven, and [place] responder after prednisolone started. Now s/p 28-day course of prednisolone (3/10/19-4/7/19). He is now off steroid therapy.\par \par # Ascites and peripheral edema:\par - Currently well-controlled.\par - Continue furosemide 20 mg po daily and spironolactone 150 mg po daily, pending repeat labs.\par - Will consider decreasing diuretics at next visit if liver function continues to improve and ascites and edema remain well-controlled.\par - Discontinue ciprofloxacin prophylaxis (previously with low protein ascites but now with no detectable ascites).\par - Mr. AGUILAR was counseled to adhere to a low sodium (<2 grams of sodium per day) diet by: avoiding adding any salt to meals (removing the salt shaker from the table); eliminating salty foods from his diet; eating more home-cooked meals; choosing fresh or frozen, not canned, vegetables and fruits; and reading ingredient and food labels to choose low sodium foods.\par \par # History of secondary esophageal varices with bleeding:\par - No recent overt bleeding.\par - Last EGD on 3/18/19 with small EV, PHG, and duodenopathy.\par - Repeat EGD in 1 year (3/2020).\par - Continue nadolol for secondary prophylaxis.\par \par # Possible PSE:\par - Had AMS during his prior hospitalization when he had EVB in 7/2017, but has not had any overt PSE since then.\par - Continue Xifaxan for now; defer lactulose for now.\par \par # HCC surveillance:\par - No evidence of HCC or PVT on US on 3/19/19.\par - Continue q6 month surveillance, next due in 9/2019.\par \par # Decompensated ALD cirrhosis:\par - Child-Lemus class C, with MELD-Na 23 based on last labs. Re-check labs today; suspect MELD-Na will be lower.\par - Discussed with patient that his liver function may continue to improve partially over the next few months with continued alcohol sobriety, but it is unclear to what extent.\par - He was encouraged to continue AA attendance but was again recommended to enroll in a formal outpatient rehab program as well. He was explicitly told enrollment and completion of a rehab program would be one condition for him to be considered as a candidate for liver transplant in the future. He was also given a brochure for [facility]. Will check urine ETG/ETS and phosphatidylethanol with labs today.\par \par # Macrocytic anemia:\par - Most likely related to ALD cirrhosis, with no evidence of folate or B12 deficiency or thyroid disorder on prior labs.\par \par # Health maintenance:\par - Mr. AGUILAR was counseled to: abstain from alcohol and all illicit drugs; avoid use of herbal and dietary supplements due to potential hepatotoxicity; limit use of acetaminophen to <2 grams per day; avoid use of nonsteroidal antiinflammatory drugs (NSAIDs) as these can precipitate renal dysfunction in patients with advanced liver disease; avoid eating any unpasteurized dairy products; and avoid eating raw/steamed oysters or other shellfish to avoid risk of Vibrio infection.\par - Immune to HAV. Non-immune to HBV, will start vaccine series today (#1).\par \par He will return to clinic for follow-up in 3 months.

## 2019-05-08 NOTE — HISTORY OF PRESENT ILLNESS
[de-identified] : Mr. Mancini is a 49 yo  M with alcohol-related cirrhosis, first diagnosed in 1/2017, complicated by a history of an esophageal variceal hemorrhage in 7/2017 and ascites, with subsequent alcohol relapse and biopsy-proven alcoholic hepatitis in 3/2019, s/p 28-day course of prednisolone therapy with improvement. He completed his prednisolone therapy on 4/7/19. Recent EGD on 3/18/19 showed small EV, PHG, and duodenopathy. He does not have any clear prior history of PSE, but remains on Xifaxan which was started by his prior gastroenterologist, Dr. Neely, in the past. He is not taking lactulose or Miralax. He has no prior known history of SBP, PVT, or HCC. No prior colonoscopy.\par \par Alcohol History:  He has a long history of alcohol abuse and dependence. He previously drank a 12-pack of beer daily as well as occasional shots of hard liquor. He reports completing a voluntary inpatient rehab program at [facility] [place] in [place], as well as two prior voluntary outpatient rehab programs (last in 2017). He has also attended AA meetings in the past, last 3 months ago. He reports that he was sober from alcohol for the past 1.5 years (his longest period of sobriety) until early January 2019, when he had an alcohol relapse. The relapse was provoked by financial stressors, particularly he reports that there was concern at the time that he may lose his housing. He reports that when he relapsed, he stopped taking all of his home medications and was drinking half a 750-mL bottle of vodka daily. He quit drinking alcohol again on 2/19/19 (1 week prior to when he first established care in our Liver clinic). He currently reports that he has been attending AA 3 times/week, but has not re-enrolled in an outpatient rehab program yet. He denies any recent alcohol cravings or slips.\par \par Social History: He works as an . He currently lives with his sister. He continues to smoke cigarettes but is cutting down with the goal of eventually quitting altogether. He is currently down to 1 cigarette/day. He denies any history of illicit drugs.\par \par He was last seen in Liver clinic on 4/5/19 and is here today for follow-up. He reports feeling well since his last visit, with decreased abdominal distension (with no need for LVP since his last visit), resolved peripheral edema, less yellowing of his eyes, and lighter color of his urine. He also feels that his energy level is better. He denies any confusion, overt GI bleeding, fevers/chills, nausea/vomiting, dysuria, cough, or diarrhea.

## 2019-05-09 LAB
25(OH)D3 SERPL-MCNC: 17 NG/ML
AFP-TM SERPL-MCNC: 6.1 NG/ML
ALBUMIN SERPL ELPH-MCNC: 3.2 G/DL
ALP BLD-CCNC: 115 U/L
ALT SERPL-CCNC: 24 U/L
ANION GAP SERPL CALC-SCNC: 15 MMOL/L
AST SERPL-CCNC: 66 U/L
BASOPHILS # BLD AUTO: 0.14 K/UL
BASOPHILS NFR BLD AUTO: 1.6 %
BILIRUB DIRECT SERPL-MCNC: 2.6 MG/DL
BILIRUB SERPL-MCNC: 4.6 MG/DL
BUN SERPL-MCNC: 12 MG/DL
CALCIUM SERPL-MCNC: 9 MG/DL
CHLORIDE SERPL-SCNC: 99 MMOL/L
CO2 SERPL-SCNC: 23 MMOL/L
CREAT SERPL-MCNC: 0.76 MG/DL
EOSINOPHIL # BLD AUTO: 0.14 K/UL
EOSINOPHIL NFR BLD AUTO: 1.6 %
GLUCOSE SERPL-MCNC: 95 MG/DL
HCT VFR BLD CALC: 43.8 %
HGB BLD-MCNC: 14.2 G/DL
IMM GRANULOCYTES NFR BLD AUTO: 1.3 %
INR PPP: 1.54 RATIO
LYMPHOCYTES # BLD AUTO: 1.95 K/UL
LYMPHOCYTES NFR BLD AUTO: 22.9 %
MAN DIFF?: NORMAL
MCHC RBC-ENTMCNC: 32.4 GM/DL
MCHC RBC-ENTMCNC: 35.7 PG
MCV RBC AUTO: 110.1 FL
MONOCYTES # BLD AUTO: 1.44 K/UL
MONOCYTES NFR BLD AUTO: 16.9 %
NEUTROPHILS # BLD AUTO: 4.74 K/UL
NEUTROPHILS NFR BLD AUTO: 55.7 %
PLATELET # BLD AUTO: 160 K/UL
POTASSIUM SERPL-SCNC: 4.8 MMOL/L
PROT SERPL-MCNC: 8.2 G/DL
PT BLD: 18 SEC
RBC # BLD: 3.98 M/UL
RBC # FLD: 13.3 %
SODIUM SERPL-SCNC: 137 MMOL/L
WBC # FLD AUTO: 8.52 K/UL

## 2019-06-11 ENCOUNTER — APPOINTMENT (OUTPATIENT)
Dept: HEPATOLOGY | Facility: CLINIC | Age: 49
End: 2019-06-11
Payer: MEDICAID

## 2019-06-11 PROCEDURE — 90739 HEPB VACC 2/4 DOSE ADULT IM: CPT

## 2019-06-11 PROCEDURE — 90471 IMMUNIZATION ADMIN: CPT

## 2019-08-07 ENCOUNTER — APPOINTMENT (OUTPATIENT)
Dept: HEPATOLOGY | Facility: CLINIC | Age: 49
End: 2019-08-07

## 2019-08-13 ENCOUNTER — APPOINTMENT (OUTPATIENT)
Dept: HEPATOLOGY | Facility: CLINIC | Age: 49
End: 2019-08-13

## 2019-08-26 ENCOUNTER — LABORATORY RESULT (OUTPATIENT)
Age: 49
End: 2019-08-26

## 2019-08-26 ENCOUNTER — APPOINTMENT (OUTPATIENT)
Dept: HEPATOLOGY | Facility: CLINIC | Age: 49
End: 2019-08-26
Payer: MEDICAID

## 2019-08-26 VITALS
HEIGHT: 66 IN | RESPIRATION RATE: 16 BRPM | HEART RATE: 75 BPM | TEMPERATURE: 97.7 F | WEIGHT: 176 LBS | DIASTOLIC BLOOD PRESSURE: 90 MMHG | SYSTOLIC BLOOD PRESSURE: 148 MMHG | BODY MASS INDEX: 28.28 KG/M2

## 2019-08-26 PROCEDURE — 99214 OFFICE O/P EST MOD 30 MIN: CPT

## 2019-08-26 NOTE — HISTORY OF PRESENT ILLNESS
[Unchanged] : unchanged [de-identified] : Mr. Mancini is a 49 year old  male with history of decompensated alcoholic cirrhosis, first diagnosed in 1/2017, complicated by a history of an esophageal variceal hemorrhage in 7/2017 and ascites, with subsequent alcohol relapse and biopsy-proven alcoholic hepatitis in 3/2019, s/p 28-day course of prednisolone therapy with improvement, who presents to the hepatology clinic for follow up. He denies any family history of liver disease, HCC, or colon cancer/polyps. He has a long history of alcohol abuse and dependence. He previously drank a 12-pack of beer daily as well as occasional shots of hard liquor. He reports completing a voluntary inpatient rehab program at [facility] [place] in 2014, as well as two prior voluntary outpatient rehab programs (last in 2017). He had 3 DUIs in the past (last 2012). He has been attending AA 4x a week.  He reports that he was sober from alcohol for the past 1.5 years (his longest period of sobriety) until January 2019, when he had an alcohol relapse. The relapse was provoked by financial stressors, particularly he reports that there was concern at the time that he may lose his housing. He currently lives with his sister. He reports that when he relapsed, he stopped taking all of his home medications and was drinking half a 750-mL bottle of vodka daily. \par \par He had a transjugular liver biopsy yesterday and paracentesis (with 2.5L ascites removed) on 03/07/19. Ascitic fluid analyses showed no evidence of SBP, with high SAAG (ascitic fluid albumin 0.3) and low protein (ascitic fluid protein 1.1). 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 He completed 28 days of prednisolone for severe alcohol hepatitis in March 2019. Of note, he smokes 1/2 ppd of cigarettes (for 25 years).\par \par Since his last visit on 05/08/19, he reports has been well. He reports he had nausea and wasn’t feeling well for two days last week but that spontaneously resolved on the 3rd day. He denies any lower extremity or ascites and has been complaint on diuretics. He continues to be engaged with AA (4x a week) but has not enrolled into SA program since his last visit. He reports he feels less inclined to join as he feels well and has had them completed in the past. He denies any signs or symptoms of GI bleed, hepatic encephalopathy, fever, or chills. \par

## 2019-08-26 NOTE — ASSESSMENT
[FreeTextEntry1] : \par Assessment and Plan: Mr. Mancini is a 49 year old  male with history of who presents to the hepatology clinic for follow up.\par \par 1. Decompensated alcoholic cirrhosis complicated by ascites and variceal bleed (last 7/2017)\par Patient doing well at this time. MELD Na 19 based on labs from 05/08/19. We will repeat labs today. \par \par Fluid Overload:  Abdomen with soft and with minimal ascites and no bilateral lower extremity edema on exam. He is currently on furosemide 20mg and spironolactone 150mg daily. Will repeat labs today. He was advised to adhere to a <2g Na diet. \par \par Hepatic Encephalopathy: He had AMS during his prior hospitalization when he had EVB in 7/2017, but has not had any overt PSE since then. Continue Xifaxan for now; defer lactulose for now.\par \par Esophageal Variceal Screen: He has a history of secondary esophageal varices with bleeding. Last EGD on 3/18/19 with small EV, PHG, and duodenopathy. Will repeat EGD in 1 year (3/2020). Instructed patient to resume secondary prophylaxis with nadolol (he held it when he was not feeling well last week) and to call the office if he notices any side effects.  \par \par HCC surveillance:  Continue with imaging every 6 months for HCC screen. US from 03/19/19 with cirrhotic liver and no liver lesion, splenomegaly, small to moderate ascites, and gallbladders sludge. Last AFP from 05/08/19 normal (6.1). US ordered for next month. \par \par He was counseled to: abstain from alcohol and all illicit drugs; avoid use of herbal and dietary supplements due to potential hepatotoxicity; limit use of acetaminophen to <2 grams per day; avoid use of nonsteroidal antiinflammatory drugs (NSAIDs) as these can precipitate renal dysfunction in patients with advanced liver disease; avoid eating any unpasteurized dairy products; and avoid eating raw/steamed oysters or other shellfish to avoid risk of Vibrio infection.\par \par 2. Alcohol Abuse \par He reports today he quit drinking in January 2019 and no relapse since his last visit. We discussed at length the importance of re-establishing and maintaining 100% alcohol sobriety. He was strongly advised to enroll in another formal rehabilitation program on several visits but has not done so. He continues to be engaged with AA (4x a week) but has not enrolled into SA program since his last visit. He reports he feels less inclined to join as he feels well and has had them completed in the past. He reports he is aware this a requirement should he decompensate further and need a transplant workup. \par \par 3. Health Maintenance \par Immunizations: Immune HAV, completed HBV- will check immunity with next set of labs \par Colonoscopy: will start screenings at 49 years of age\par \par Follow Up: 3 months \par \par Rad Rao\par Nurse Practitioner \par Hepatology\par [facility] \par [address]\[place] [address]\par Tel: [phone]\par

## 2019-08-26 NOTE — PHYSICAL EXAM
[Abdominal  Ascites] : ascites [General Appearance - Alert] : alert [Respiration, Rhythm And Depth] : normal respiratory rhythm and effort [Scattered Wheezes] : scattered wheezing [Abdomen Soft] : soft [Heart Rate And Rhythm] : heart rate was normal and rhythm regular [Edema] : there was no peripheral edema [Abdomen Tenderness] : non-tender [Abnormal Walk] : normal gait [Skin Color & Pigmentation] : normal skin color and pigmentation [Oriented To Time, Place, And Person] : oriented to person, place, and time [Scleral Icterus] : No Scleral Icterus [Spider Angioma] : No spider angioma(s) were observed [Asterixis] : no asterixis observed [Ascites Fluid Wave] : no ascites fluid wave [Jaundice] : No jaundice [Depression] : no depression [Hallucinations] : ~T no ~M hallucinations [Delusions] : no ~T delusions [Homicidal Ideation] : no homicidal ideations [Suicidal Ideation] : no suicidal ideation [Preoccupiation With Violent Thoughts] : no preoccupation with violent thoughts [FreeTextEntry1] : umbilical hernia

## 2019-08-27 LAB
ALBUMIN SERPL ELPH-MCNC: 3.2 G/DL
ALP BLD-CCNC: 114 U/L
ALT SERPL-CCNC: 46 U/L
ANION GAP SERPL CALC-SCNC: 17 MMOL/L
AST SERPL-CCNC: 150 U/L
BASOPHILS # BLD AUTO: 0.08 K/UL
BASOPHILS NFR BLD AUTO: 1.1 %
BILIRUB SERPL-MCNC: 9 MG/DL
BUN SERPL-MCNC: 10 MG/DL
CALCIUM SERPL-MCNC: 8.7 MG/DL
CHLORIDE SERPL-SCNC: 94 MMOL/L
CO2 SERPL-SCNC: 20 MMOL/L
CREAT SERPL-MCNC: 0.76 MG/DL
EOSINOPHIL # BLD AUTO: 0.15 K/UL
EOSINOPHIL NFR BLD AUTO: 2 %
GLUCOSE SERPL-MCNC: 120 MG/DL
HCT VFR BLD CALC: 42.7 %
HGB BLD-MCNC: 14.6 G/DL
IMM GRANULOCYTES NFR BLD AUTO: 0.8 %
INR PPP: 2.06 RATIO
LYMPHOCYTES # BLD AUTO: 1.25 K/UL
LYMPHOCYTES NFR BLD AUTO: 16.6 %
MAN DIFF?: NORMAL
MCHC RBC-ENTMCNC: 34.2 GM/DL
MCHC RBC-ENTMCNC: 37.4 PG
MCV RBC AUTO: 109.5 FL
MONOCYTES # BLD AUTO: 0.94 K/UL
MONOCYTES NFR BLD AUTO: 12.5 %
NEUTROPHILS # BLD AUTO: 5.06 K/UL
NEUTROPHILS NFR BLD AUTO: 67 %
PLATELET # BLD AUTO: 78 K/UL
POTASSIUM SERPL-SCNC: 4.1 MMOL/L
PROT SERPL-MCNC: 8.8 G/DL
PT BLD: 24 SEC
RBC # BLD: 3.9 M/UL
RBC # FLD: 13.7 %
SODIUM SERPL-SCNC: 130 MMOL/L
WBC # FLD AUTO: 7.54 K/UL

## 2019-09-03 LAB
APPEARANCE: CLEAR
BILIRUB DIRECT SERPL-MCNC: 3.2 MG/DL
BILIRUBIN URINE: NEGATIVE
BLOOD URINE: NEGATIVE
COLOR: YELLOW
GLUCOSE QUALITATIVE U: NEGATIVE
KETONES URINE: NEGATIVE
LEUKOCYTE ESTERASE URINE: NEGATIVE
NITRITE URINE: NEGATIVE
PH URINE: 6
PROTEIN URINE: NEGATIVE
SPECIFIC GRAVITY URINE: 1.01
UROBILINOGEN URINE: NORMAL

## 2019-09-04 LAB
MISCELLANEOUS TEST: NORMAL
PROC NAME: NORMAL

## 2019-09-05 ENCOUNTER — RX RENEWAL (OUTPATIENT)
Age: 49
End: 2019-09-05

## 2019-09-18 ENCOUNTER — APPOINTMENT (OUTPATIENT)
Dept: HEPATOLOGY | Facility: CLINIC | Age: 49
End: 2019-09-18

## 2019-09-27 ENCOUNTER — APPOINTMENT (OUTPATIENT)
Dept: HEPATOLOGY | Facility: CLINIC | Age: 49
End: 2019-09-27

## 2019-10-26 ENCOUNTER — OUTPATIENT (OUTPATIENT)
Dept: OUTPATIENT SERVICES | Facility: HOSPITAL | Age: 49
LOS: 1 days | End: 2019-10-26
Payer: COMMERCIAL

## 2019-10-26 ENCOUNTER — APPOINTMENT (OUTPATIENT)
Dept: ULTRASOUND IMAGING | Facility: CLINIC | Age: 49
End: 2019-10-26

## 2019-10-26 DIAGNOSIS — Z00.8 ENCOUNTER FOR OTHER GENERAL EXAMINATION: ICD-10-CM

## 2019-10-26 PROCEDURE — 76700 US EXAM ABDOM COMPLETE: CPT | Mod: 26

## 2019-10-26 PROCEDURE — 76700 US EXAM ABDOM COMPLETE: CPT

## 2019-12-09 ENCOUNTER — RX RENEWAL (OUTPATIENT)
Age: 49
End: 2019-12-09

## 2020-01-07 ENCOUNTER — RX RENEWAL (OUTPATIENT)
Age: 50
End: 2020-01-07

## 2020-01-22 ENCOUNTER — APPOINTMENT (OUTPATIENT)
Dept: HEPATOLOGY | Facility: CLINIC | Age: 50
End: 2020-01-22
Payer: MEDICAID

## 2020-01-22 ENCOUNTER — LABORATORY RESULT (OUTPATIENT)
Age: 50
End: 2020-01-22

## 2020-01-22 VITALS
HEIGHT: 66 IN | SYSTOLIC BLOOD PRESSURE: 96 MMHG | TEMPERATURE: 98.3 F | WEIGHT: 189 LBS | BODY MASS INDEX: 30.37 KG/M2 | DIASTOLIC BLOOD PRESSURE: 67 MMHG | HEART RATE: 71 BPM | RESPIRATION RATE: 16 BRPM

## 2020-01-22 DIAGNOSIS — E55.9 VITAMIN D DEFICIENCY, UNSPECIFIED: ICD-10-CM

## 2020-01-22 DIAGNOSIS — K70.11 ALCOHOLIC HEPATITIS WITH ASCITES: ICD-10-CM

## 2020-01-22 DIAGNOSIS — J45.20 MILD INTERMITTENT ASTHMA, UNCOMPLICATED: ICD-10-CM

## 2020-01-22 PROCEDURE — 99215 OFFICE O/P EST HI 40 MIN: CPT

## 2020-01-22 RX ORDER — SPIRONOLACTONE 100 MG/1
100 TABLET ORAL DAILY
Qty: 45 | Refills: 3 | Status: DISCONTINUED | COMMUNITY
Start: 2018-10-10 | End: 2019-09-16

## 2020-01-22 RX ORDER — AMLODIPINE BESYLATE 10 MG/1
10 TABLET ORAL DAILY
Refills: 0 | Status: DISCONTINUED | COMMUNITY
Start: 2018-02-26 | End: 2020-01-22

## 2020-01-22 RX ORDER — ALBUTEROL SULFATE 90 UG/1
108 (90 BASE) AEROSOL, METERED RESPIRATORY (INHALATION)
Qty: 1 | Refills: 5 | Status: ACTIVE | COMMUNITY
Start: 2019-03-01 | End: 1900-01-01

## 2020-01-22 NOTE — ASSESSMENT
[FreeTextEntry1] : 50 yo M with alcohol abuse/dependence with recent relapse (last reported drink yesterday on 1/21/20), history of biopsy-proven alcoholic hepatitis (3/2019), and decompensated alcohol-related cirrhosis complicated by ascites, peripheral edema, and prior esophageal variceal hemorrhage (7/2017), with no history of SBP, PSE, PVT, or HCC.\par \par # Ascites and peripheral edema:\par - Has increased since last visit in context of recent alcohol relapse.\par - Re-check BMP today.\par - Continue furosemide 20 mg po daily and spironolactone 100 mg po daily, pending repeat labs. Will increase as tolerated.\par - Ascites is currently non-tense, therefore will defer LVP for now.\par - Resume ciprofloxacin for primary SBP prophylaxis given history of low protein ascites and advanced cirrhosis.\par - Mr. AGUILAR was counseled to adhere to a low sodium (<2 grams of sodium per day) diet by: avoiding adding any salt to meals (removing the salt shaker from the table); eliminating salty foods from his diet; eating more home-cooked meals; choosing fresh or frozen, not canned, vegetables and fruits; and reading ingredient and food labels to choose low sodium foods.\par \par # History of secondary esophageal varices with bleeding:\par - No recent overt bleeding.\par - Last EGD on 3/18/19 with small EV, PHG, and duodenopathy.\par - Previously planned for repeat EGD in 1 year (3/2020), but will order today given progressive liver disease in context of recent alcohol relapse.\par - Given dizziness with low BP today, will decrease nadolol (for secondary prophylaxis) from 40 mg po daily to 20 mg po daily. May need to be discontinued altogether if not tolerating.\par - Defer colonoscopy (for age-appropriate colon cancer screening) for now given end-stage liver disease.\par \par # Possible PSE:\par - Had AMS during his prior hospitalization when he had EVB in 7/2017, but has not had any overt PSE since then.\par - Continue Xifaxan for now; defer lactulose for now.\par \par # HCC surveillance:\par - No evidence of HCC or PVT on US on 10/26/19.\par - Continue q6 month surveillance, next due in 4/2020.\par \par # Decompensated ALD cirrhosis:\par - Child-Lemus class C. Re-check labs today; suspect MELD-Na will be higher than prior given recent alcohol relapse.\par - He is not currently a liver transplant candidate given alcohol relapses including recently, despite known advanced liver disease. He will need to complete an alcohol rehab program and achieve a longer duration of sobriety, at a minimum, in order for his candidacy to be re-considered.\par \par # Severe AUD, with recent alcohol relapse:\par - Last reported drink yesterday on 1/21/20. He is motivated to quit drinking.\par - He was encouraged to resume attending AA and to enroll in a formal rehab program. He has a list previously given to him of programs and said he will try to call one to enroll.\par - Start baclofen 10 mg po tid for MAT.\par \par # Health maintenance:\par - Mr. AGUILAR was counseled to: abstain from alcohol and all illicit drugs; avoid use of herbal and dietary supplements due to potential hepatotoxicity; limit use of acetaminophen to <2 grams per day; avoid use of nonsteroidal antiinflammatory drugs (NSAIDs) as these can precipitate renal dysfunction in patients with advanced liver disease; avoid eating any unpasteurized dairy products; and avoid eating raw/steamed oysters or other shellfish to avoid risk of Vibrio infection.\par - Immune to HAV.\par - Non-immune to HBV previously, now s/p Heplisav vaccine series completed on 6/11/19. Will re-check HBsAb to assess for immunity with future labs.\par - Continue ergocalciferol for vitamin D deficiency.\par - Continue folic acid, thiamine, and MVI supplementation.\par - Will re-assess for other vitamin/mineral deficiencies with labs today given recent alcohol relapse.\par \par He will return for close follow-up in 1 month.

## 2020-01-22 NOTE — REVIEW OF SYSTEMS
[Feeling Poorly] : feeling poorly [Feeling Tired] : feeling tired [Lower Ext Edema] : lower extremity edema [Dizziness] : dizziness [As Noted in HPI] : as noted in HPI [Easy Bruising] : a tendency for easy bruising [Negative] : Heme/Lymph

## 2020-01-22 NOTE — HISTORY OF PRESENT ILLNESS
[de-identified] : Mr. Mancini is a 48 yo  M with alcohol-related cirrhosis, first diagnosed in 1/2017, complicated by a history of an esophageal variceal hemorrhage in 7/2017 and ascites, with subsequent alcohol relapse and biopsy-proven alcoholic hepatitis in 3/2019 (s/p 28-day course of prednisolone therapy with improvement, completed on 4/7/19). He now has decompensated cirrhosis complicated by ascites, peripheral edema, esophageal varices with prior history of hemorrhage, portal hypertensive gastropathy/duodenopathy, and hyponatremia. He has no prior history of SBP, PVT, or HCC.\par \par He was last seen on 8/26/19. Labs from that date showed interval worsening in his liver function including worsened hyponatremia, and his spironolactone was therefore decreased from 150 mg/day to 100 mg/day. His furosemide was continued at 20 mg/day. His PEth (drawn on 8/26/19) subsequently resulted as positive at 935 ng/mL.\par \par Today, he admitted that he had a recent alcohol relapse. At first he said that he relapsed 1 month ago because one of his [employer] job sites was near a liquor store. However, when we discussed his prior lab results, he admitted that he relapsed in August as well, then quit again, and then relapsed again. He has been drinking vodka. He reports that his last drink was yesterday (1/21/20). He says that his girlfriend is aware of his alcohol relapse. He has not been attending  and has not enrolled in a formal rehab program yet, but is motivated to quit drinking again. \par \par He has been losing muscle mass in his face and extremities recently and also has noticed that his eyes and skin appear more yellow than before. He also reports increasing abdominal distension. He has bruising on his sacrum and a "bump" from a resolving bruise on his LLE but does not recall any falls or trauma. He feels that he bruises very easily. He denies any recent confusion. He has intermittent dizziness when ambulating.

## 2020-01-22 NOTE — CONSULT LETTER
[Dear  ___] : Dear  [unfilled], [Courtesy Letter:] : I had the pleasure of seeing your patient, [unfilled], in my office today. [Please see my note below.] : Please see my note below. [Consult Closing:] : Thank you very much for allowing me to participate in the care of this patient.  If you have any questions, please do not hesitate to contact me. [FreeTextEntry2] : Dr. Breezy Maza [FreeTextEntry3] : Sincerely,\par \par Edy Drew M.D., Ph.D.\par  of Medicine\par [facility]\par [address] [place] [address]\[place] [address]\par Tel: [phone]\par Fax: [phone]\par Cell: [phone]\par E-mail: [email or web address]\par

## 2020-01-23 LAB
25(OH)D3 SERPL-MCNC: 15.1 NG/ML
AFP-TM SERPL-MCNC: 7.9 NG/ML
ALBUMIN SERPL ELPH-MCNC: 2.5 G/DL
ALP BLD-CCNC: 105 U/L
ALT SERPL-CCNC: 39 U/L
ANION GAP SERPL CALC-SCNC: 14 MMOL/L
AST SERPL-CCNC: 126 U/L
BASOPHILS # BLD AUTO: 0.17 K/UL
BASOPHILS NFR BLD AUTO: 2.4 %
BILIRUB DIRECT SERPL-MCNC: 11.8 MG/DL
BILIRUB SERPL-MCNC: 20 MG/DL
BUN SERPL-MCNC: 12 MG/DL
CALCIUM SERPL-MCNC: 8.4 MG/DL
CHLORIDE SERPL-SCNC: 95 MMOL/L
CO2 SERPL-SCNC: 24 MMOL/L
CREAT SERPL-MCNC: 0.67 MG/DL
EOSINOPHIL # BLD AUTO: 0.25 K/UL
EOSINOPHIL NFR BLD AUTO: 3.5 %
FOLATE SERPL-MCNC: >20 NG/ML
GLUCOSE SERPL-MCNC: 129 MG/DL
HCT VFR BLD CALC: 35.1 %
HGB BLD-MCNC: 11.7 G/DL
IMM GRANULOCYTES NFR BLD AUTO: 1.8 %
INR PPP: 2.68 RATIO
LYMPHOCYTES # BLD AUTO: 1.6 K/UL
LYMPHOCYTES NFR BLD AUTO: 22.4 %
MAGNESIUM SERPL-MCNC: 1.3 MG/DL
MAN DIFF?: NORMAL
MCHC RBC-ENTMCNC: 33.3 GM/DL
MCHC RBC-ENTMCNC: 37.6 PG
MCV RBC AUTO: 112.9 FL
MONOCYTES # BLD AUTO: 1.59 K/UL
MONOCYTES NFR BLD AUTO: 22.3 %
NEUTROPHILS # BLD AUTO: 3.39 K/UL
NEUTROPHILS NFR BLD AUTO: 47.6 %
PHOSPHATE SERPL-MCNC: 3 MG/DL
PLATELET # BLD AUTO: 102 K/UL
POTASSIUM SERPL-SCNC: 3.6 MMOL/L
PROT SERPL-MCNC: 7.8 G/DL
PT BLD: 31.5 SEC
RBC # BLD: 3.11 M/UL
RBC # FLD: 15.6 %
SODIUM SERPL-SCNC: 133 MMOL/L
VIT B12 SERPL-MCNC: >2000 PG/ML
WBC # FLD AUTO: 7.13 K/UL

## 2020-01-27 LAB
VIT B1 SERPL-MCNC: 139.7 NMOL/L
ZINC SERPL-MCNC: 35 UG/DL

## 2020-01-30 ENCOUNTER — APPOINTMENT (OUTPATIENT)
Dept: HEPATOLOGY | Facility: CLINIC | Age: 50
End: 2020-01-30
Payer: MEDICAID

## 2020-01-30 VITALS
HEART RATE: 57 BPM | BODY MASS INDEX: 31.34 KG/M2 | HEIGHT: 66 IN | DIASTOLIC BLOOD PRESSURE: 69 MMHG | WEIGHT: 195 LBS | SYSTOLIC BLOOD PRESSURE: 123 MMHG | TEMPERATURE: 97.8 F | RESPIRATION RATE: 16 BRPM

## 2020-01-30 DIAGNOSIS — E61.2 MAGNESIUM DEFICIENCY: ICD-10-CM

## 2020-01-30 DIAGNOSIS — E60 DIETARY ZINC DEFICIENCY: ICD-10-CM

## 2020-01-30 LAB
ALBUMIN SERPL ELPH-MCNC: 2.5 G/DL
ALP BLD-CCNC: 117 U/L
ALT SERPL-CCNC: 32 U/L
ANION GAP SERPL CALC-SCNC: 12 MMOL/L
AST SERPL-CCNC: 87 U/L
BASOPHILS # BLD AUTO: 0.17 K/UL
BASOPHILS NFR BLD AUTO: 1.9 %
BILIRUB DIRECT SERPL-MCNC: 7.4 MG/DL
BILIRUB SERPL-MCNC: 13.7 MG/DL
BUN SERPL-MCNC: 15 MG/DL
CALCIUM SERPL-MCNC: 8.5 MG/DL
CHLORIDE SERPL-SCNC: 98 MMOL/L
CO2 SERPL-SCNC: 22 MMOL/L
CREAT SERPL-MCNC: 1.01 MG/DL
EOSINOPHIL # BLD AUTO: 0.14 K/UL
EOSINOPHIL NFR BLD AUTO: 1.5 %
ETHANOL BLD-MCNC: <10 MG/DL
GLUCOSE SERPL-MCNC: 96 MG/DL
HCT VFR BLD CALC: 32.3 %
HGB BLD-MCNC: 10.8 G/DL
IMM GRANULOCYTES NFR BLD AUTO: 2.4 %
INR PPP: 2.52 RATIO
LYMPHOCYTES # BLD AUTO: 1.3 K/UL
LYMPHOCYTES NFR BLD AUTO: 14.3 %
MAGNESIUM SERPL-MCNC: 1.6 MG/DL
MAN DIFF?: NORMAL
MCHC RBC-ENTMCNC: 33.4 GM/DL
MCHC RBC-ENTMCNC: 38.4 PG
MCV RBC AUTO: 114.9 FL
MONOCYTES # BLD AUTO: 1.49 K/UL
MONOCYTES NFR BLD AUTO: 16.4 %
NEUTROPHILS # BLD AUTO: 5.76 K/UL
NEUTROPHILS NFR BLD AUTO: 63.5 %
PLATELET # BLD AUTO: 119 K/UL
POTASSIUM SERPL-SCNC: 4.5 MMOL/L
PROT SERPL-MCNC: 7.6 G/DL
PT BLD: 29.6 SEC
RBC # BLD: 2.81 M/UL
RBC # FLD: 14.9 %
SODIUM SERPL-SCNC: 132 MMOL/L
WBC # FLD AUTO: 9.08 K/UL

## 2020-01-30 PROCEDURE — 99214 OFFICE O/P EST MOD 30 MIN: CPT

## 2020-01-30 NOTE — REVIEW OF SYSTEMS
[Recent Weight Gain (___ Lbs)] : recent [unfilled] ~Ulb weight gain [As Noted in HPI] : as noted in HPI [Easy Bruising] : a tendency for easy bruising [Negative] : Heme/Lymph

## 2020-01-30 NOTE — CONSULT LETTER
[Dear  ___] : Dear  [unfilled], [Courtesy Letter:] : I had the pleasure of seeing your patient, [unfilled], in my office today. [Please see my note below.] : Please see my note below. [Consult Closing:] : Thank you very much for allowing me to participate in the care of this patient.  If you have any questions, please do not hesitate to contact me. [FreeTextEntry2] : Dr. Breezy Maza [FreeTextEntry3] : Sincerely,\par \par Edy Drew M.D., Ph.D.\par  of Medicine\par [facility]\par [address] [place] [address]\[place] [address]\par Tel: [phone]\par Fax: [phone]\par Cell: [phone]\par E-mail: [email or web address]\par

## 2020-01-30 NOTE — HISTORY OF PRESENT ILLNESS
[de-identified] : Mr. Mancini is a 50 yo  M with alcohol-related cirrhosis, first diagnosed in 1/2017, complicated by a history of an esophageal variceal hemorrhage in 7/2017 and ascites, with subsequent alcohol relapse and biopsy-proven alcoholic hepatitis in 3/2019 (s/p 28-day course of prednisolone therapy with improvement, completed on 4/7/19), now with decompensated cirrhosis complicated by ascites, peripheral edema, esophageal varices with prior history of hemorrhage, portal hypertensive gastropathy/duodenopathy, and hyponatremia. He has no prior history of SBP, PVT, or HCC. He had another alcohol relapse in 8/2019 with subsequent deterioration of his liver function including worsened jaundice and ascites.\par \par He was last seen 1 week ago, on 1/22/20. At that visit, his diuretics were increased from furosemide 20 mg po daily to 40 mg po daily and from spironolactone 100 mg po daily to 150 mg po daily. His nadolol was decreased from 40 mg po daily to 20 mg po daily due to borderline low BP. Ciprofloxacin for SBP prophylaxis was resumed. He was started on magnesium oxide for deficiency and zinc sulfate for deficiency. He was also started on baclofen 10 mg po tid as MAT to help with alcohol abstinence.\par \par Today, he reports feeling better than last week. His abdominal distension is improving. No peripheral edema. He initially had some tremors last week when he stopped drinking, but is no longer feeling tremulous. He reports that his last drink was on 1/21/20. He has not enrolled in a rehab program yet.

## 2020-01-30 NOTE — ASSESSMENT
[FreeTextEntry1] : 48 yo M with alcohol abuse/dependence with recent relapse (last reported drink on 1/21/20), history of biopsy-proven alcoholic hepatitis (3/2019), and decompensated alcohol-related cirrhosis complicated by ascites, peripheral edema, and prior esophageal variceal hemorrhage (7/2017), with no history of SBP, PSE, PVT, or HCC.\par \par # Ascites and peripheral edema:\par - Ascites has improved and peripheral edema has resolved with increased diuretics.\par - Electrolytes stable, but Cr increased from 0.67 to 1.01 in the past week.\par - Continue furosemide 40 mg po daily and spironolactone 150 mg po daily for now.\par - Re-check BMP in 1 week. May need to decrease doses and/or receive IV albumin if renal function worsening.\par - Ascites is currently non-tense, therefore will defer LVP for now.\par - Continue ciprofloxacin for primary SBP prophylaxis given history of low protein ascites and advanced cirrhosis.\par - Mr. AUGILAR was counseled to adhere to a low sodium (<2 grams of sodium per day) diet by: avoiding adding any salt to meals (removing the salt shaker from the table); eliminating salty foods from his diet; eating more home-cooked meals; choosing fresh or frozen, not canned, vegetables and fruits; and reading ingredient and food labels to choose low sodium foods.\par \par # History of secondary esophageal varices with bleeding:\par - No recent overt bleeding.\par - Last EGD on 3/18/19 with small EV, PHG, and duodenopathy.\par - Repeat EGD ordered at last visit for variceal surveillance, will schedule.\par - Continue reduced dose of nadolol at 20 mg po daily. HR at goal of <60 bpm today. Previously had dizziness when taking 40 mg/day.\par - Defer colonoscopy (for age-appropriate colon cancer screening) for now given end-stage liver disease, as risks currently outweigh benefits.\par \par # Possible PSE:\par - Had AMS during his prior hospitalization when he had EVB in 7/2017, but has not had any overt PSE since then.\par - Continue Xifaxan for now; defer lactulose for now.\par \par # HCC surveillance:\par - No evidence of HCC or PVT on US on 10/26/19.\par - Continue q6 month surveillance, next due in 4/2020.\par \par # Decompensated ALD cirrhosis:\par - Child-Lemus class C, with MELD-Na 29 based on labs yesterday (slightly decreased from MELD-Na 30 last week).\par - He is not currently a liver transplant candidate given multiple alcohol relapses including recently, despite known advanced liver disease. He will need to complete an alcohol rehab program and achieve a longer duration of sobriety, at a minimum, in order for his candidacy to be re-considered.\par \par # Severe AUD, with recent alcohol relapse:\par - Last reported drink on 1/21/20.\par - Blood alcohol <10 on 1/29/20 labs.Continue biomarker surveillance.\par - He was encouraged to resume attending AA and to enroll in a formal rehab program. He has a list previously given to him of programs and said he will try to call one to enroll.\par - Continue baclofen 10 mg po tid for MAT.\par \par # Health maintenance:\par - Mr. AGUILAR was counseled to: abstain from alcohol and all illicit drugs; avoid use of herbal and dietary supplements due to potential hepatotoxicity; limit use of acetaminophen to <2 grams per day; avoid use of nonsteroidal antiinflammatory drugs (NSAIDs) as these can precipitate renal dysfunction in patients with advanced liver disease; avoid eating any unpasteurized dairy products; and avoid eating raw/steamed oysters or other shellfish to avoid risk of Vibrio infection.\par - Immune to HAV.\par - Non-immune to HBV previously, now s/p Heplisav vaccine series completed on 6/11/19. Will re-check HBsAb to assess for immunity with next labs.\par - Continue ergocalciferol for vitamin D deficiency.\par - Continue zinc sulfate for deficiency and magnesium oxide for deficiency.\par - Continue folic acid, thiamine, and MVI supplementation.\par \par He will return for close follow-up in 2 weeks.

## 2020-02-10 ENCOUNTER — RX RENEWAL (OUTPATIENT)
Age: 50
End: 2020-02-10

## 2020-02-12 ENCOUNTER — LABORATORY RESULT (OUTPATIENT)
Age: 50
End: 2020-02-12

## 2020-02-13 ENCOUNTER — APPOINTMENT (OUTPATIENT)
Dept: HEPATOLOGY | Facility: CLINIC | Age: 50
End: 2020-02-13

## 2020-02-13 LAB
ALBUMIN SERPL ELPH-MCNC: 2.4 G/DL
ALP BLD-CCNC: 123 U/L
ALT SERPL-CCNC: 30 U/L
ANION GAP SERPL CALC-SCNC: 11 MMOL/L
ANION GAP SERPL CALC-SCNC: 16 MMOL/L
AST SERPL-CCNC: 76 U/L
BASOPHILS # BLD AUTO: 0.1 K/UL
BASOPHILS NFR BLD AUTO: 1.4 %
BILIRUB DIRECT SERPL-MCNC: 5 MG/DL
BILIRUB SERPL-MCNC: 8.9 MG/DL
BUN SERPL-MCNC: 12 MG/DL
BUN SERPL-MCNC: 12 MG/DL
CALCIUM SERPL-MCNC: 8.3 MG/DL
CALCIUM SERPL-MCNC: 8.4 MG/DL
CHLORIDE SERPL-SCNC: 97 MMOL/L
CHLORIDE SERPL-SCNC: 98 MMOL/L
CO2 SERPL-SCNC: 21 MMOL/L
CO2 SERPL-SCNC: 25 MMOL/L
CREAT SERPL-MCNC: 0.72 MG/DL
CREAT SERPL-MCNC: 0.73 MG/DL
EOSINOPHIL # BLD AUTO: 0.19 K/UL
EOSINOPHIL NFR BLD AUTO: 2.7 %
ETHANOL BLD-MCNC: <10 MG/DL
GLUCOSE SERPL-MCNC: 93 MG/DL
GLUCOSE SERPL-MCNC: 95 MG/DL
HBV SURFACE AB SERPL IA-ACNC: 617.9 MIU/ML
HCT VFR BLD CALC: 34 %
HGB BLD-MCNC: 11.3 G/DL
IMM GRANULOCYTES NFR BLD AUTO: 1 %
INR PPP: 2.53 RATIO
LYMPHOCYTES # BLD AUTO: 1.45 K/UL
LYMPHOCYTES NFR BLD AUTO: 20.3 %
MAN DIFF?: NORMAL
MCHC RBC-ENTMCNC: 33.2 GM/DL
MCHC RBC-ENTMCNC: 38.7 PG
MCV RBC AUTO: 116.4 FL
MONOCYTES # BLD AUTO: 1.26 K/UL
MONOCYTES NFR BLD AUTO: 17.6 %
NEUTROPHILS # BLD AUTO: 4.09 K/UL
NEUTROPHILS NFR BLD AUTO: 57 %
PLATELET # BLD AUTO: 101 K/UL
POTASSIUM SERPL-SCNC: 3.4 MMOL/L
POTASSIUM SERPL-SCNC: 3.6 MMOL/L
PROT SERPL-MCNC: 7.7 G/DL
PT BLD: 29.7 SEC
RBC # BLD: 2.92 M/UL
RBC # FLD: 14.7 %
SODIUM SERPL-SCNC: 133 MMOL/L
SODIUM SERPL-SCNC: 135 MMOL/L
WBC # FLD AUTO: 7.16 K/UL

## 2020-02-21 NOTE — ASU PATIENT PROFILE, ADULT - NS TRANSFER PROSTHESIS:
[facility]   Operative Note    Pre-operative diagnosis: LEFT BUTTOCKS ABSCESS   Post-operative diagnosis Necrotic ischial decubitus ulcer    Procedure: Procedure(s):  EXCISIONAL DEBRIDEMENT LEFT BUTTOCKS,  WOUND: 11 CM X 6 CM X 2CM - Wound Class: II-Clean Contaminated   Surgeon(s): Surgeon(s) and Role:     * Sampson Mitchell MD - Primary   Estimated blood loss: 100 mL    Specimens:   ID Type Source Tests Collected by Time Destination   1 : left buttock lesion Tissue Buttock ANAEROBIC BACTERIAL CULTURE, GRAM STAIN, TISSUE CULTURE AEROBIC BACTERIAL Sampson Mitchell MD 4/14/2018 10:25 AM       Findings: Necrotic ischial decubitus ulcer debrided back down to viable tissue, there is connective tissue overlying bone present at wound base.      Description of procedure:     Patient was brought to the operating room placed supine and general anesthesia was administered. He was then rolled to right lateral decubitus position ensuring all pressure points were padded. Then a pause for patient safety was preformed and confirmed right patient and surgery site. Then using sharp debridement the necrotic tissue was debrided down to bone. The dimensions of debridement were 11 x 6 x 2cm. Hemostasis was ensured, and then the wound was irrigated copiously with 3L NaCl using a pulse vac system. Hemostasis was again ensured and the wound was packed with sterile saline moistened kerlex roll, abd and mesh underwear. He was again placed supine and awoken without issue. He tolerated the procedure without complication.     Sampson Mitchell       
na

## 2020-02-23 ENCOUNTER — RX RENEWAL (OUTPATIENT)
Age: 50
End: 2020-02-23

## 2020-02-23 LAB
ALCOHOL BIOMARKERS QUANT, URINE: NORMAL NG/ML
BACTERIA BLD CULT: NORMAL
ETHYL SULFATE: NORMAL NG/ML
ZZALCOHOL BIOMARKERS QUANT UR: NEGATIVE

## 2020-02-24 ENCOUNTER — OUTPATIENT (OUTPATIENT)
Dept: OUTPATIENT SERVICES | Facility: HOSPITAL | Age: 50
LOS: 1 days | Discharge: HOME | End: 2020-02-24
Payer: MEDICAID

## 2020-02-24 ENCOUNTER — APPOINTMENT (OUTPATIENT)
Dept: HEPATOLOGY | Facility: HOSPITAL | Age: 50
End: 2020-02-24

## 2020-02-24 VITALS
SYSTOLIC BLOOD PRESSURE: 110 MMHG | WEIGHT: 184.97 LBS | DIASTOLIC BLOOD PRESSURE: 64 MMHG | RESPIRATION RATE: 14 BRPM | TEMPERATURE: 98 F | OXYGEN SATURATION: 97 % | HEIGHT: 68 IN | HEART RATE: 52 BPM

## 2020-02-24 VITALS
DIASTOLIC BLOOD PRESSURE: 60 MMHG | RESPIRATION RATE: 16 BRPM | OXYGEN SATURATION: 98 % | HEART RATE: 68 BPM | SYSTOLIC BLOOD PRESSURE: 100 MMHG

## 2020-02-24 DIAGNOSIS — Z98.890 OTHER SPECIFIED POSTPROCEDURAL STATES: Chronic | ICD-10-CM

## 2020-02-24 DIAGNOSIS — I85.11 SECONDARY ESOPHAGEAL VARICES WITH BLEEDING: ICD-10-CM

## 2020-02-24 PROCEDURE — 43235 EGD DIAGNOSTIC BRUSH WASH: CPT | Mod: 52

## 2020-02-24 RX ORDER — SODIUM CHLORIDE 9 MG/ML
1000 INJECTION, SOLUTION INTRAVENOUS
Refills: 0 | Status: DISCONTINUED | OUTPATIENT
Start: 2020-02-24 | End: 2020-03-11

## 2020-02-24 RX ADMIN — SODIUM CHLORIDE 30 MILLILITER(S): 9 INJECTION, SOLUTION INTRAVENOUS at 08:53

## 2020-03-10 ENCOUNTER — RX RENEWAL (OUTPATIENT)
Age: 50
End: 2020-03-10

## 2020-03-12 ENCOUNTER — RX RENEWAL (OUTPATIENT)
Age: 50
End: 2020-03-12

## 2020-03-19 ENCOUNTER — APPOINTMENT (OUTPATIENT)
Dept: HEPATOLOGY | Facility: CLINIC | Age: 50
End: 2020-03-19

## 2020-03-19 RX ORDER — BACLOFEN 10 MG/1
10 TABLET ORAL
Qty: 270 | Refills: 2 | Status: ACTIVE | COMMUNITY
Start: 2020-01-22 | End: 1900-01-01

## 2020-03-19 RX ORDER — RIFAXIMIN 550 MG/1
550 TABLET ORAL
Qty: 180 | Refills: 2 | Status: ACTIVE | COMMUNITY
Start: 2018-10-10 | End: 1900-01-01

## 2020-03-19 RX ORDER — FOLIC ACID 1 MG/1
1 TABLET ORAL DAILY
Qty: 90 | Refills: 2 | Status: ACTIVE | COMMUNITY
Start: 2018-04-07 | End: 1900-01-01

## 2020-03-19 RX ORDER — ERGOCALCIFEROL 1.25 MG/1
1.25 MG CAPSULE ORAL WEEKLY
Qty: 12 | Refills: 2 | Status: ACTIVE | COMMUNITY
Start: 2019-04-11 | End: 1900-01-01

## 2020-03-19 RX ORDER — SPIRONOLACTONE 100 MG/1
100 TABLET ORAL DAILY
Qty: 135 | Refills: 2 | Status: ACTIVE | COMMUNITY
Start: 2020-03-12 | End: 1900-01-01

## 2020-03-19 RX ORDER — CIPROFLOXACIN HYDROCHLORIDE 500 MG/1
500 TABLET, FILM COATED ORAL DAILY
Qty: 90 | Refills: 2 | Status: ACTIVE | COMMUNITY
Start: 2020-01-22 | End: 1900-01-01

## 2020-03-19 RX ORDER — MAGNESIUM OXIDE 400 MG
400 (241.3 MG) TABLET ORAL
Qty: 60 | Refills: 0 | Status: COMPLETED | COMMUNITY
Start: 2020-02-23 | End: 2020-03-19

## 2020-03-19 RX ORDER — MULTIVITAMIN
TABLET ORAL DAILY
Qty: 90 | Refills: 2 | Status: ACTIVE | COMMUNITY
Start: 2020-01-22 | End: 1900-01-01

## 2020-07-10 ENCOUNTER — INPATIENT (INPATIENT)
Facility: HOSPITAL | Age: 50
LOS: 6 days | Discharge: ANOTHER HOSPITAL | DRG: 432 | End: 2020-07-17
Attending: INTERNAL MEDICINE | Admitting: STUDENT IN AN ORGANIZED HEALTH CARE EDUCATION/TRAINING PROGRAM
Payer: COMMERCIAL

## 2020-07-10 VITALS
TEMPERATURE: 99 F | HEART RATE: 111 BPM | WEIGHT: 184.97 LBS | SYSTOLIC BLOOD PRESSURE: 162 MMHG | OXYGEN SATURATION: 95 % | DIASTOLIC BLOOD PRESSURE: 74 MMHG | HEIGHT: 68 IN | RESPIRATION RATE: 18 BRPM

## 2020-07-10 DIAGNOSIS — I10 ESSENTIAL (PRIMARY) HYPERTENSION: ICD-10-CM

## 2020-07-10 DIAGNOSIS — E87.8 OTHER DISORDERS OF ELECTROLYTE AND FLUID BALANCE, NOT ELSEWHERE CLASSIFIED: ICD-10-CM

## 2020-07-10 DIAGNOSIS — K72.00 ACUTE AND SUBACUTE HEPATIC FAILURE WITHOUT COMA: ICD-10-CM

## 2020-07-10 DIAGNOSIS — K70.31 ALCOHOLIC CIRRHOSIS OF LIVER WITH ASCITES: ICD-10-CM

## 2020-07-10 DIAGNOSIS — Z29.9 ENCOUNTER FOR PROPHYLACTIC MEASURES, UNSPECIFIED: ICD-10-CM

## 2020-07-10 DIAGNOSIS — D64.9 ANEMIA, UNSPECIFIED: ICD-10-CM

## 2020-07-10 DIAGNOSIS — Z98.890 OTHER SPECIFIED POSTPROCEDURAL STATES: Chronic | ICD-10-CM

## 2020-07-10 DIAGNOSIS — I85.01 ESOPHAGEAL VARICES WITH BLEEDING: ICD-10-CM

## 2020-07-10 DIAGNOSIS — K74.60 UNSPECIFIED CIRRHOSIS OF LIVER: ICD-10-CM

## 2020-07-10 DIAGNOSIS — F10.10 ALCOHOL ABUSE, UNCOMPLICATED: ICD-10-CM

## 2020-07-10 LAB
ALBUMIN SERPL ELPH-MCNC: 1.7 G/DL — LOW (ref 3.3–5)
ALP SERPL-CCNC: 110 U/L — SIGNIFICANT CHANGE UP (ref 40–120)
ALT FLD-CCNC: 60 U/L — SIGNIFICANT CHANGE UP (ref 12–78)
AMPHET UR-MCNC: NEGATIVE — SIGNIFICANT CHANGE UP
ANION GAP SERPL CALC-SCNC: 11 MMOL/L — SIGNIFICANT CHANGE UP (ref 5–17)
ANISOCYTOSIS BLD QL: SLIGHT — SIGNIFICANT CHANGE UP
APPEARANCE UR: ABNORMAL
APTT BLD: 48.7 SEC — HIGH (ref 27.5–35.5)
AST SERPL-CCNC: 176 U/L — HIGH (ref 15–37)
BACTERIA # UR AUTO: ABNORMAL
BARBITURATES UR SCN-MCNC: NEGATIVE — SIGNIFICANT CHANGE UP
BASOPHILS # BLD AUTO: 0.07 K/UL — SIGNIFICANT CHANGE UP (ref 0–0.2)
BASOPHILS NFR BLD AUTO: 1 % — SIGNIFICANT CHANGE UP (ref 0–2)
BENZODIAZ UR-MCNC: NEGATIVE — SIGNIFICANT CHANGE UP
BILIRUB SERPL-MCNC: 23.5 MG/DL — CRITICAL HIGH (ref 0.2–1.2)
BILIRUB UR-MCNC: ABNORMAL
BUN SERPL-MCNC: 11 MG/DL — SIGNIFICANT CHANGE UP (ref 7–23)
CALCIUM SERPL-MCNC: 8 MG/DL — LOW (ref 8.5–10.1)
CHLORIDE SERPL-SCNC: 93 MMOL/L — LOW (ref 96–108)
CO2 SERPL-SCNC: 25 MMOL/L — SIGNIFICANT CHANGE UP (ref 22–31)
COCAINE METAB.OTHER UR-MCNC: NEGATIVE — SIGNIFICANT CHANGE UP
COLOR SPEC: ABNORMAL
COMMENT - URINE: SIGNIFICANT CHANGE UP
CREAT SERPL-MCNC: 0.94 MG/DL — SIGNIFICANT CHANGE UP (ref 0.5–1.3)
DIFF PNL FLD: ABNORMAL
EOSINOPHIL # BLD AUTO: 0.07 K/UL — SIGNIFICANT CHANGE UP (ref 0–0.5)
EOSINOPHIL NFR BLD AUTO: 1 % — SIGNIFICANT CHANGE UP (ref 0–6)
EPI CELLS # UR: SIGNIFICANT CHANGE UP
ETHANOL SERPL-MCNC: 30 MG/DL — HIGH (ref 0–10)
GLUCOSE SERPL-MCNC: 106 MG/DL — HIGH (ref 70–99)
GLUCOSE UR QL: NEGATIVE — SIGNIFICANT CHANGE UP
HCT VFR BLD CALC: 24.2 % — LOW (ref 39–50)
HGB BLD-MCNC: 8.1 G/DL — LOW (ref 13–17)
INR BLD: 3.86 RATIO — HIGH (ref 0.88–1.16)
KETONES UR-MCNC: ABNORMAL
LEUKOCYTE ESTERASE UR-ACNC: NEGATIVE — SIGNIFICANT CHANGE UP
LG PLATELETS BLD QL AUTO: SLIGHT — SIGNIFICANT CHANGE UP
LIDOCAIN IGE QN: 356 U/L — SIGNIFICANT CHANGE UP (ref 73–393)
LYMPHOCYTES # BLD AUTO: 1.41 K/UL — SIGNIFICANT CHANGE UP (ref 1–3.3)
LYMPHOCYTES # BLD AUTO: 20 % — SIGNIFICANT CHANGE UP (ref 13–44)
MACROCYTES BLD QL: SIGNIFICANT CHANGE UP
MANUAL SMEAR VERIFICATION: SIGNIFICANT CHANGE UP
MCHC RBC-ENTMCNC: 33.5 GM/DL — SIGNIFICANT CHANGE UP (ref 32–36)
MCHC RBC-ENTMCNC: 38.8 PG — HIGH (ref 27–34)
MCV RBC AUTO: 115.8 FL — HIGH (ref 80–100)
METHADONE UR-MCNC: NEGATIVE — SIGNIFICANT CHANGE UP
MONOCYTES # BLD AUTO: 1.34 K/UL — HIGH (ref 0–0.9)
MONOCYTES NFR BLD AUTO: 19 % — HIGH (ref 2–14)
MYELOCYTES NFR BLD: 2 % — HIGH (ref 0–0)
NEUTROPHILS # BLD AUTO: 4.01 K/UL — SIGNIFICANT CHANGE UP (ref 1.8–7.4)
NEUTROPHILS NFR BLD AUTO: 57 % — SIGNIFICANT CHANGE UP (ref 43–77)
NITRITE UR-MCNC: NEGATIVE — SIGNIFICANT CHANGE UP
NRBC # BLD: 0 — SIGNIFICANT CHANGE UP
NRBC # BLD: SIGNIFICANT CHANGE UP /100 WBCS (ref 0–0)
OPIATES UR-MCNC: NEGATIVE — SIGNIFICANT CHANGE UP
PCP SPEC-MCNC: SIGNIFICANT CHANGE UP
PCP UR-MCNC: NEGATIVE — SIGNIFICANT CHANGE UP
PH UR: 7 — SIGNIFICANT CHANGE UP (ref 5–8)
PLAT MORPH BLD: NORMAL — SIGNIFICANT CHANGE UP
PLATELET # BLD AUTO: 69 K/UL — LOW (ref 150–400)
PLATELET CLUMP BLD QL SMEAR: ABNORMAL
POLYCHROMASIA BLD QL SMEAR: SLIGHT — SIGNIFICANT CHANGE UP
POTASSIUM SERPL-MCNC: 3.4 MMOL/L — LOW (ref 3.5–5.3)
POTASSIUM SERPL-SCNC: 3.4 MMOL/L — LOW (ref 3.5–5.3)
PROT SERPL-MCNC: 7.4 G/DL — SIGNIFICANT CHANGE UP (ref 6–8.3)
PROT UR-MCNC: 30 MG/DL
PROTHROM AB SERPL-ACNC: 42.4 SEC — HIGH (ref 10.6–13.6)
RBC # BLD: 2.09 M/UL — LOW (ref 4.2–5.8)
RBC # FLD: 17.9 % — HIGH (ref 10.3–14.5)
RBC BLD AUTO: ABNORMAL
RBC CASTS # UR COMP ASSIST: SIGNIFICANT CHANGE UP /HPF (ref 0–4)
SARS-COV-2 RNA SPEC QL NAA+PROBE: SIGNIFICANT CHANGE UP
SODIUM SERPL-SCNC: 129 MMOL/L — LOW (ref 135–145)
SP GR SPEC: 1.01 — SIGNIFICANT CHANGE UP (ref 1.01–1.02)
THC UR QL: NEGATIVE — SIGNIFICANT CHANGE UP
UROBILINOGEN FLD QL: 12
WBC # BLD: 7.04 K/UL — SIGNIFICANT CHANGE UP (ref 3.8–10.5)
WBC # FLD AUTO: 7.04 K/UL — SIGNIFICANT CHANGE UP (ref 3.8–10.5)
WBC UR QL: SIGNIFICANT CHANGE UP

## 2020-07-10 PROCEDURE — 99285 EMERGENCY DEPT VISIT HI MDM: CPT

## 2020-07-10 PROCEDURE — 71045 X-RAY EXAM CHEST 1 VIEW: CPT | Mod: 26

## 2020-07-10 PROCEDURE — 73502 X-RAY EXAM HIP UNI 2-3 VIEWS: CPT | Mod: 26,RT

## 2020-07-10 PROCEDURE — 93010 ELECTROCARDIOGRAM REPORT: CPT

## 2020-07-10 PROCEDURE — 99223 1ST HOSP IP/OBS HIGH 75: CPT | Mod: GC,AI

## 2020-07-10 RX ORDER — ONDANSETRON 8 MG/1
4 TABLET, FILM COATED ORAL ONCE
Refills: 0 | Status: COMPLETED | OUTPATIENT
Start: 2020-07-10 | End: 2020-07-10

## 2020-07-10 RX ORDER — POTASSIUM CHLORIDE 20 MEQ
40 PACKET (EA) ORAL ONCE
Refills: 0 | Status: COMPLETED | OUTPATIENT
Start: 2020-07-10 | End: 2020-07-10

## 2020-07-10 RX ORDER — PHYTONADIONE (VIT K1) 5 MG
5 TABLET ORAL ONCE
Refills: 0 | Status: COMPLETED | OUTPATIENT
Start: 2020-07-10 | End: 2020-07-10

## 2020-07-10 RX ORDER — THIAMINE MONONITRATE (VIT B1) 100 MG
100 TABLET ORAL DAILY
Refills: 0 | Status: DISCONTINUED | OUTPATIENT
Start: 2020-07-10 | End: 2020-07-17

## 2020-07-10 RX ORDER — MAGNESIUM OXIDE 400 MG ORAL TABLET 241.3 MG
400 TABLET ORAL DAILY
Refills: 0 | Status: DISCONTINUED | OUTPATIENT
Start: 2020-07-10 | End: 2020-07-17

## 2020-07-10 RX ORDER — FUROSEMIDE 40 MG
20 TABLET ORAL DAILY
Refills: 0 | Status: DISCONTINUED | OUTPATIENT
Start: 2020-07-10 | End: 2020-07-12

## 2020-07-10 RX ORDER — AMLODIPINE BESYLATE 2.5 MG/1
10 TABLET ORAL DAILY
Refills: 0 | Status: DISCONTINUED | OUTPATIENT
Start: 2020-07-10 | End: 2020-07-12

## 2020-07-10 RX ORDER — THIAMINE MONONITRATE (VIT B1) 100 MG
1 TABLET ORAL
Qty: 0 | Refills: 0 | DISCHARGE

## 2020-07-10 RX ORDER — FOLIC ACID 0.8 MG
1 TABLET ORAL ONCE
Refills: 0 | Status: COMPLETED | OUTPATIENT
Start: 2020-07-10 | End: 2020-07-10

## 2020-07-10 RX ORDER — ONDANSETRON 8 MG/1
4 TABLET, FILM COATED ORAL EVERY 6 HOURS
Refills: 0 | Status: DISCONTINUED | OUTPATIENT
Start: 2020-07-10 | End: 2020-07-17

## 2020-07-10 RX ORDER — SPIRONOLACTONE 25 MG/1
150 TABLET, FILM COATED ORAL DAILY
Refills: 0 | Status: DISCONTINUED | OUTPATIENT
Start: 2020-07-10 | End: 2020-07-12

## 2020-07-10 RX ORDER — FAMOTIDINE 10 MG/ML
20 INJECTION INTRAVENOUS ONCE
Refills: 0 | Status: COMPLETED | OUTPATIENT
Start: 2020-07-10 | End: 2020-07-10

## 2020-07-10 RX ORDER — SPIRONOLACTONE 25 MG/1
1 TABLET, FILM COATED ORAL
Qty: 0 | Refills: 0 | DISCHARGE

## 2020-07-10 RX ORDER — THIAMINE MONONITRATE (VIT B1) 100 MG
100 TABLET ORAL ONCE
Refills: 0 | Status: COMPLETED | OUTPATIENT
Start: 2020-07-10 | End: 2020-07-10

## 2020-07-10 RX ORDER — FOLIC ACID 0.8 MG
1 TABLET ORAL DAILY
Refills: 0 | Status: DISCONTINUED | OUTPATIENT
Start: 2020-07-10 | End: 2020-07-17

## 2020-07-10 RX ORDER — FAMOTIDINE 10 MG/ML
20 INJECTION INTRAVENOUS
Refills: 0 | Status: DISCONTINUED | OUTPATIENT
Start: 2020-07-10 | End: 2020-07-17

## 2020-07-10 RX ORDER — SODIUM CHLORIDE 9 MG/ML
1000 INJECTION INTRAMUSCULAR; INTRAVENOUS; SUBCUTANEOUS ONCE
Refills: 0 | Status: COMPLETED | OUTPATIENT
Start: 2020-07-10 | End: 2020-07-10

## 2020-07-10 RX ORDER — SODIUM CHLORIDE 9 MG/ML
1000 INJECTION INTRAMUSCULAR; INTRAVENOUS; SUBCUTANEOUS
Refills: 0 | Status: DISCONTINUED | OUTPATIENT
Start: 2020-07-10 | End: 2020-07-11

## 2020-07-10 RX ORDER — ERGOCALCIFEROL 1.25 MG/1
50000 CAPSULE ORAL
Refills: 0 | Status: DISCONTINUED | OUTPATIENT
Start: 2020-07-10 | End: 2020-07-17

## 2020-07-10 RX ADMIN — SODIUM CHLORIDE 1000 MILLILITER(S): 9 INJECTION INTRAMUSCULAR; INTRAVENOUS; SUBCUTANEOUS at 18:51

## 2020-07-10 RX ADMIN — Medication 5 MILLIGRAM(S): at 23:30

## 2020-07-10 RX ADMIN — ONDANSETRON 4 MILLIGRAM(S): 8 TABLET, FILM COATED ORAL at 18:50

## 2020-07-10 RX ADMIN — Medication 100 MILLIGRAM(S): at 18:50

## 2020-07-10 RX ADMIN — SODIUM CHLORIDE 75 MILLILITER(S): 9 INJECTION INTRAMUSCULAR; INTRAVENOUS; SUBCUTANEOUS at 23:31

## 2020-07-10 RX ADMIN — Medication 1 MILLIGRAM(S): at 19:15

## 2020-07-10 RX ADMIN — Medication 40 MILLIEQUIVALENT(S): at 23:30

## 2020-07-10 RX ADMIN — FAMOTIDINE 20 MILLIGRAM(S): 10 INJECTION INTRAVENOUS at 18:50

## 2020-07-10 RX ADMIN — Medication 50 MILLIGRAM(S): at 19:15

## 2020-07-10 NOTE — H&P ADULT - NSICDXPASTMEDICALHX_GEN_ALL_CORE_FT
PAST MEDICAL HISTORY:  Ascites due to alcoholic cirrhosis     Cirrhosis of liver     Esophageal varices with bleeding 7/2017    ETOH abuse     HTN (hypertension)     IBS (irritable bowel syndrome)

## 2020-07-10 NOTE — H&P ADULT - PROBLEM SELECTOR PLAN 8
-chronic, on home medications as above  -continue home meds with hold parameters  -monitor serial BP and tx symptomatic hypertension if pt withdraws

## 2020-07-10 NOTE — H&P ADULT - NSHPREVIEWOFSYSTEMS_GEN_ALL_CORE
CONSTITUTIONAL: denies current fever, chills, fatigue, weakness  HEENT: denies blurred vision, sore throat  SKIN: endorses jaundice and icterus, denies new lesions, rash  CARDIOVASCULAR: denies chest pain, chest pressure, palpitations  RESPIRATORY: denies shortness of breath, sputum production  GASTROINTESTINAL: denies current nausea, vomiting, diarrhea, abdominal pain, melena or hematemesis, hemoptysis  GENITOURINARY: endorses dark urine denies dysuria, discharge  NEUROLOGICAL: denies numbness, headache, focal weakness  MUSCULOSKELETAL: denies new joint pain, muscle aches  HEMATOLOGIC: denies gross bleeding, bruising  LYMPHATICS: denies enlarged lymph nodes, extremity swelling

## 2020-07-10 NOTE — H&P ADULT - PROBLEM SELECTOR PLAN 6
-chronic, on home medications as above  -will continue with hold parameters and monitor for changes in liver fx and mental status

## 2020-07-10 NOTE — ED PROVIDER NOTE - OBJECTIVE STATEMENT
Weakness and here for detox.  pt relapsed from alcohol since that past April drinking about 1/2 quart of vodka every other day.  Last intake yesterday.  pt has been sober for.    HO paracentesis for ascites 1.5 years ago.  pmd [facility].     s/p trip and fell on right last week and has been limping.  no other complaints.

## 2020-07-10 NOTE — H&P ADULT - PROBLEM SELECTOR PLAN 1
-pt with h/o cirrhosis, esophageal varices s/p banding in 2017, 2/2 chronic alcoholism  -multiple hospitalizations for detox and withdraw, no h/o seizures  -of note pt has reacted badly to ativan, became very violent with hallucinations, had a better experience with Librium  -h/o cirrhosis on home medications, spironolactone, furosemide, nadolol, continue inpatient with hold parameters  -Librium 50 PO x 1 in ED, continue for symptom triggered  -famotidine 20 IV x 1 in ED, continue PO famotidine inpatient  -folic acid 1 mg x 1 PO in ED, continue  -zofran 4 IV x 1 in ED, continue PRN for nausea  -thiamine 100 IV x 1 in ED, continue  -vitamin K PO ordered  -T bili 23.5, , ALT 60, Alb 1.7  -PT/INR 42/3.8, f/u AM  -type and screen ordered  -monitor HD stability and new onset bleeding varices  -WA protocol  -consider GI consult if pt decompensates -pt with h/o cirrhosis, esophageal varices s/p banding in 2017, 2/2 chronic alcoholism  -multiple hospitalizations for detox and withdraw, no h/o seizures  -of note pt has reacted badly to ativan, became very violent with hallucinations, had a better experience with Librium  -h/o cirrhosis on home medications, spironolactone, furosemide, nadolol, continue inpatient with hold parameters, propranolol alternative for nadolol  -Librium 50 PO x 1 in ED, continue for symptom triggered  -famotidine 20 IV x 1 in ED, continue PO famotidine inpatient  -folic acid 1 mg x 1 PO in ED, continue  -zofran 4 IV x 1 in ED, continue PRN for nausea  -thiamine 100 IV x 1 in ED, continue  -vitamin K PO ordered  -T bili 23.5, , ALT 60, Alb 1.7  -PT/INR 42/3.8, f/u AM  -type and screen ordered  -monitor HD stability and new onset bleeding varices  -[facility] protocol  -consider GI consult if pt decompensates

## 2020-07-10 NOTE — H&P ADULT - NSHPSOCIALHISTORY_GEN_ALL_CORE
Tobacco: current smoker since 19 y.o. 1/2 PPD  EtOH: chronic, 1 quart vodka daily  Recreational drug use: denies  Lives with: mother and sister  Ambulates: independently   ADLs: independently, help from family  Vaccinations: UTD with flu shots

## 2020-07-10 NOTE — H&P ADULT - PROBLEM SELECTOR PLAN 5
-pt has h/o of paracentesis 1 year ago with 2 L removed, 2/2 cirrhosis from chronic ETOH abuse  -no current abdominal discomfort  -mod ascites on PE  -monitor for increase in distention, fevers, and abdominal pain  - -pt has h/o of paracentesis 1 year ago with 2 L removed, 2/2 cirrhosis from chronic ETOH abuse  -no current abdominal discomfort  -mod ascites on PE  -monitor for increase in distention, fevers, and abdominal pain  -mild asterixes on exam but no overt sign of HE

## 2020-07-10 NOTE — ED ADULT TRIAGE NOTE - CHIEF COMPLAINT QUOTE
Pt states" my last drink was on Wednesday,tremors, decrease appetite. weakness, abd distention. I would like to detox."

## 2020-07-10 NOTE — H&P ADULT - PROBLEM SELECTOR PLAN 2
-chronic ETOH abuse since for >20 years, denies h/o seizures from withdrawl  -drinks 1 quart vodka daily  -h/o cirrhosis on home medications  -h/o bleeding esophageal varices with banding in 2017  -type and screen ordered  -monitor for HD stability  -CIWA protocol  -will keep on Librium for now due to ADR to ativan, monitor worsening Liver fx -chronic ETOH abuse since for >20 years, denies h/o seizures from withdrawal  -drinks 1 quart vodka daily  -h/o cirrhosis on home medications  -h/o bleeding esophageal varices with banding in 2017  -type and screen ordered  -monitor for HD stability  -CIWA protocol  -will keep on Librium for now due to ADR to ativan, monitor worsening Liver fx

## 2020-07-10 NOTE — H&P ADULT - PROBLEM SELECTOR PLAN 3
-new onset drop in H/H  -Hg 8.1 (previous 13.2 2019), HCT 24.2 (previous 37.4 2019)  -pt denies bleeding, hemoptysis, hematemesis, melena  -FOBT ordered  -anemia panel ordered for AM, f/u results  -vitamin K PO ordered  -trend CBC, PT/INR  -transfuse if Hg <7

## 2020-07-10 NOTE — H&P ADULT - HISTORY OF PRESENT ILLNESS
49 y/o M with a PMHx of alcohol dependence, liver cirrhosis, esophageal varices s/p banding in 2017, ascites s/p paracentesis 1 year ago, IBS, who comes in for detox and jaundice x 1 month. He states he relapsed in April after successfully quitting for 6 months previously. He has been admitted for detox multiple times in the past, in 2014 he was admitted at [facility] and 2017 at [facility]. He denies seizures in the past from withdrawal however he does endorse shaking, N, V when he doesnt drinks for 2-3 days. Pt states he drinks 1 quart of vodka daily and his last drink was yesterday around 3 PM. He follows with GI Dr. Drew at [facility] regularly for his cirrhosis. He noticed he was getting jaundiced about 1 month ago. He states the last time this happened his T bili was also elevated just like today. He also endorses falling 1 week ago onto his right hip. He has been able to walk on it but has pain when standing for a long time. Denies shooting pain into his leg, numbness tingling. He endorses chills, difficulty with balance, N, V of clear liquid when he doesn't drink for 2-3 days. He denies current fevers, chills, neck pain, visual changes, CP, SOB, cough, palpitations, abdominal pain, diarrhea, dysuria. He endorses chronic balance difficulties, abdominal distention without pain, yellowing of his skin and eyes,   darkening of his urine, right hip pain.    ED course:  vitals: T 98.5,  --> 79, /74 --> 127/65, RR 18, SpO2 96% RA  significant labs: Hg 8.1 (last 13.2 in 2019), HCT 24.2 (last 37.4 in 2019), Na 129, K+ 3.4, Cl 93, , , T bili 23.5, Blood ETOH 30, PT/INR 42/3.8, U/A large bili, trace ketone, 30 protein, small blood,   imaging: CXR shows Grossly clear lungs  R hip Xray unofficial read: appears grossly normal  EKG: pending in ED

## 2020-07-10 NOTE — H&P ADULT - PROBLEM SELECTOR PLAN 9
SCDs due to bleeding risk  IMPROVE VTE Individual Risk Assessment          RISK                                                          Points  [  ] Previous VTE                                                3  [  ] Thrombophilia                                             2  [  ] Lower limb paralysis                                   2        (unable to hold up >15 seconds)    [  ] Current Cancer                                             2         (within 6 months)  [  ] Immobilization > 24 hrs                              1  [  ] ICU/CCU stay > 24 hours                             1  [  ] Age > 60                                                         1    IMPROVE VTE Score:         [    0     ]  Total Risk Factor Score:    0 - 1:   Consider IPC  >2 - 3:  Thromboprophylaxis required (enoxaparin or SQ heparin)        >4:   High Risk: Thromboprophylaxis required (enoxaparin or SQ heparin), optional add IPC  **If CONTRAINDICATION to enoxaparin or SQ heparin, USE IPCs**

## 2020-07-10 NOTE — H&P ADULT - ASSESSMENT
49 y/o M with a PMHx of alcohol dependence, liver cirrhosis, esophageal varices s/p banding in 2017, ascites s/p paracentesis 1 year ago, IBS, who comes in for detox and jaundice x 1 month. Pt being admitted for alcohol detox, elevated bilirubin, and anemia. 49 y/o M with a PMHx of alcohol dependence, liver cirrhosis, esophageal varices s/p banding in 2017, ascites s/p paracentesis 1 year ago, IBS, who comes in for detox and jaundice x 1 month. Pt being admitted for alcohol detox, elevated bilirubin and elevated INR in the setting likely 2/2  acute on chronic liver failure and anemia.

## 2020-07-10 NOTE — ED ADULT NURSE NOTE - OBJECTIVE STATEMENT
Pt received in bed alert and oriented and resting in bed with the c/o Pt received in bed alert and oriented and resting in bed with the c/o weakness, recent fall, SOB and alcohol withdrawal.  Pt states that his last drink was yesterday at 3pm. Pt states that at that time he drank about a pint of vodka. Pt has bruising at different stages of healing over entire body; also has a large bruise to right hip that he sustained a week ago from fall. Pt is very jaundiced and has very distended abd and pronounced LE edema. As per Md's orders IV camille placed blood specimen obtained and sent to the lab meds given and tolerated well. Pt swabbed for covid.

## 2020-07-10 NOTE — H&P ADULT - PROBLEM SELECTOR PLAN 7
-s/p bleeding esophageal varices banding in 2017  -on nadolol at home, will continue inpatient with hold parameters  -type and screen ordered  -PT/INR elevated  -vitamin K ordered  -pt currently stable and comfortable with no active N or V  -monitor for new onset hemoptysis or HD instability

## 2020-07-10 NOTE — H&P ADULT - PROBLEM SELECTOR PLAN 4
-Na 129, K+ 3.4  -pt endorses chronic hyponatremia 2/2 cirrhosis  -denies current confusion, AMS, seizures  -neuro checks and seizure protocol in place  -IV fluids NS 75 cc/hr, 1 L NS bolus given in ED  -high protein diet with possible salt repletion if no increase in Na with fluids  -f/u AM CMP -Na 129, K+ 3.4  -pt endorses chronic hyponatremia 2/2 cirrhosis  -denies current confusion, AMS, seizures  -neuro checks and seizure protocol in place  -IV fluids NS 75 cc/hr, 1 L NS bolus given in ED  -potassium chloride 40 mEq PO x 1 ordered  -high protein diet with possible salt repletion if no increase in Na with fluids  -f/u AM CMP -Na 129, K+ 3.4  -pt endorses chronic hyponatremia 2/2 cirrhosis  -denies current confusion, AMS, seizures  -neuro checks and seizure protocol in place  -IV fluids NS 75 cc/hr, 1 L NS bolus given in ED, will proceed judiciously with IVF, patient may need to be placed on fluid restriction and consider Na repletion if worsen Na or concerns for neurological impairment due to hypoNa  -potassium chloride 40 mEq PO x 1 ordered  -f/u AM CMP

## 2020-07-10 NOTE — H&P ADULT - NSHPPHYSICALEXAM_GEN_ALL_CORE
T(C): 36.9 (07-10-20 @ 20:00), Max: 37.2 (07-10-20 @ 17:24)  HR: 79 (07-10-20 @ 20:00) (79 - 111)  BP: 127/65 (07-10-20 @ 20:00) (127/65 - 162/74)  RR: 18 (07-10-20 @ 20:00) (18 - 18)  SpO2: 96% (07-10-20 @ 20:00) (95% - 96%)    GENERAL: patient appears jaundiced, no acute distress, appropriate, pleasant  EYES: sclera very icteric, no exudates  ENMT: oropharynx clear without erythema, no exudates, moist mucous membranes  NECK: supple, soft, no thyromegaly noted  LUNGS: bilateral wheezing and congested lungs, likely 2/2 smoking, no crackles or rhonchi   HEART: soft S1/S2, regular rate and rhythm, no murmurs noted, mild LE edema 1+ up to mid lower leg  GASTROINTESTINAL: abdomen is soft, nontender, nondistended, normoactive bowel sounds, no palpable masses  INTEGUMENT: good skin turgor, severly jaundiced  MUSCULOSKELETAL: no clubbing or cyanosis, no obvious deformity  NEUROLOGIC: awake, alert, oriented x3, good muscle tone in 4 extremities, no obvious sensory deficits  PSYCHIATRIC: mood is good, affect is congruent, linear and logical thought process  HEME/LYMPH: no obvious ecchymosis or petechiae T(C): 36.9 (07-10-20 @ 20:00), Max: 37.2 (07-10-20 @ 17:24)  HR: 79 (07-10-20 @ 20:00) (79 - 111)  BP: 127/65 (07-10-20 @ 20:00) (127/65 - 162/74)  RR: 18 (07-10-20 @ 20:00) (18 - 18)  SpO2: 96% (07-10-20 @ 20:00) (95% - 96%)    GENERAL: patient appears jaundiced, no acute distress, appropriate, pleasant  EYES: sclera very icteric, no exudates  ENMT: oropharynx clear without erythema, no exudates, moist mucous membranes  NECK: supple, soft, no thyromegaly noted  LUNGS: bilateral wheezing and congested lungs, likely 2/2 smoking, no crackles or rhonchi   HEART: soft S1/S2, regular rate and rhythm, no murmurs noted, mild LE edema 1+ up to mid thigh ( dependent edema )  GASTROINTESTINAL: abdomen is soft, nontender, nondistended, normoactive bowel sounds, no palpable masses  INTEGUMENT: good skin turgor, severly jaundiced  MUSCULOSKELETAL: no clubbing or cyanosis, no obvious deformity  NEUROLOGIC: awake, alert, oriented x3, good muscle tone in 4 extremities, no obvious sensory deficits, +  PSYCHIATRIC: mood is good, affect is congruent, linear and logical thought process  HEME/LYMPH: no obvious ecchymosis or petechiae

## 2020-07-11 DIAGNOSIS — K70.11 ALCOHOLIC HEPATITIS WITH ASCITES: ICD-10-CM

## 2020-07-11 DIAGNOSIS — Z72.89 OTHER PROBLEMS RELATED TO LIFESTYLE: ICD-10-CM

## 2020-07-11 DIAGNOSIS — F10.231 ALCOHOL DEPENDENCE WITH WITHDRAWAL DELIRIUM: ICD-10-CM

## 2020-07-11 DIAGNOSIS — K70.10 ALCOHOLIC HEPATITIS WITHOUT ASCITES: ICD-10-CM

## 2020-07-11 LAB
ALBUMIN SERPL ELPH-MCNC: 1.6 G/DL — LOW (ref 3.3–5)
ALP SERPL-CCNC: 96 U/L — SIGNIFICANT CHANGE UP (ref 40–120)
ALT FLD-CCNC: 52 U/L — SIGNIFICANT CHANGE UP (ref 12–78)
AMMONIA BLD-MCNC: 142 UMOL/L — HIGH (ref 11–32)
ANION GAP SERPL CALC-SCNC: 6 MMOL/L — SIGNIFICANT CHANGE UP (ref 5–17)
ANION GAP SERPL CALC-SCNC: 8 MMOL/L — SIGNIFICANT CHANGE UP (ref 5–17)
APTT BLD: 50.4 SEC — HIGH (ref 27.5–35.5)
AST SERPL-CCNC: 147 U/L — HIGH (ref 15–37)
BASOPHILS # BLD AUTO: 0.07 K/UL — SIGNIFICANT CHANGE UP (ref 0–0.2)
BASOPHILS NFR BLD AUTO: 1.1 % — SIGNIFICANT CHANGE UP (ref 0–2)
BILIRUB SERPL-MCNC: 22.6 MG/DL — CRITICAL HIGH (ref 0.2–1.2)
BUN SERPL-MCNC: 11 MG/DL — SIGNIFICANT CHANGE UP (ref 7–23)
BUN SERPL-MCNC: 12 MG/DL — SIGNIFICANT CHANGE UP (ref 7–23)
CALCIUM SERPL-MCNC: 7.7 MG/DL — LOW (ref 8.5–10.1)
CALCIUM SERPL-MCNC: 8 MG/DL — LOW (ref 8.5–10.1)
CHLORIDE SERPL-SCNC: 100 MMOL/L — SIGNIFICANT CHANGE UP (ref 96–108)
CHLORIDE SERPL-SCNC: 97 MMOL/L — SIGNIFICANT CHANGE UP (ref 96–108)
CO2 SERPL-SCNC: 24 MMOL/L — SIGNIFICANT CHANGE UP (ref 22–31)
CO2 SERPL-SCNC: 28 MMOL/L — SIGNIFICANT CHANGE UP (ref 22–31)
CREAT SERPL-MCNC: 1 MG/DL — SIGNIFICANT CHANGE UP (ref 0.5–1.3)
CREAT SERPL-MCNC: 1.2 MG/DL — SIGNIFICANT CHANGE UP (ref 0.5–1.3)
EOSINOPHIL # BLD AUTO: 0.18 K/UL — SIGNIFICANT CHANGE UP (ref 0–0.5)
EOSINOPHIL NFR BLD AUTO: 2.8 % — SIGNIFICANT CHANGE UP (ref 0–6)
FERRITIN SERPL-MCNC: 706 NG/ML — HIGH (ref 30–400)
FOLATE SERPL-MCNC: 16.1 NG/ML — SIGNIFICANT CHANGE UP
GLUCOSE SERPL-MCNC: 117 MG/DL — HIGH (ref 70–99)
GLUCOSE SERPL-MCNC: 128 MG/DL — HIGH (ref 70–99)
HBV SURFACE AB SER-ACNC: REACTIVE
HBV SURFACE AG SER-ACNC: SIGNIFICANT CHANGE UP
HCT VFR BLD CALC: 21.1 % — LOW (ref 39–50)
HCV AB S/CO SERPL IA: 0.2 S/CO — SIGNIFICANT CHANGE UP (ref 0–0.99)
HCV AB SERPL-IMP: SIGNIFICANT CHANGE UP
HGB BLD-MCNC: 7.2 G/DL — LOW (ref 13–17)
IMM GRANULOCYTES NFR BLD AUTO: 2.4 % — HIGH (ref 0–1.5)
INR BLD: 4.07 RATIO — HIGH (ref 0.88–1.16)
IRON SATN MFR SERPL: 70 % — HIGH (ref 16–55)
IRON SATN MFR SERPL: 99 UG/DL — SIGNIFICANT CHANGE UP (ref 45–165)
LYMPHOCYTES # BLD AUTO: 1.17 K/UL — SIGNIFICANT CHANGE UP (ref 1–3.3)
LYMPHOCYTES # BLD AUTO: 18.5 % — SIGNIFICANT CHANGE UP (ref 13–44)
MAGNESIUM SERPL-MCNC: 1.4 MG/DL — LOW (ref 1.6–2.6)
MAGNESIUM SERPL-MCNC: 1.9 MG/DL — SIGNIFICANT CHANGE UP (ref 1.6–2.6)
MCHC RBC-ENTMCNC: 34.1 GM/DL — SIGNIFICANT CHANGE UP (ref 32–36)
MCHC RBC-ENTMCNC: 40.2 PG — HIGH (ref 27–34)
MCV RBC AUTO: 117.9 FL — HIGH (ref 80–100)
MONOCYTES # BLD AUTO: 1.31 K/UL — HIGH (ref 0–0.9)
MONOCYTES NFR BLD AUTO: 20.7 % — HIGH (ref 2–14)
NEUTROPHILS # BLD AUTO: 3.44 K/UL — SIGNIFICANT CHANGE UP (ref 1.8–7.4)
NEUTROPHILS NFR BLD AUTO: 54.5 % — SIGNIFICANT CHANGE UP (ref 43–77)
NRBC # BLD: 0 /100 WBCS — SIGNIFICANT CHANGE UP (ref 0–0)
PHOSPHATE SERPL-MCNC: 2.5 MG/DL — SIGNIFICANT CHANGE UP (ref 2.5–4.5)
PLATELET # BLD AUTO: 52 K/UL — LOW (ref 150–400)
POTASSIUM SERPL-MCNC: 3.4 MMOL/L — LOW (ref 3.5–5.3)
POTASSIUM SERPL-MCNC: 3.8 MMOL/L — SIGNIFICANT CHANGE UP (ref 3.5–5.3)
POTASSIUM SERPL-SCNC: 3.4 MMOL/L — LOW (ref 3.5–5.3)
POTASSIUM SERPL-SCNC: 3.8 MMOL/L — SIGNIFICANT CHANGE UP (ref 3.5–5.3)
PROT SERPL-MCNC: 6.5 G/DL — SIGNIFICANT CHANGE UP (ref 6–8.3)
PROTHROM AB SERPL-ACNC: 44.5 SEC — HIGH (ref 10.6–13.6)
RBC # BLD: 1.79 M/UL — LOW (ref 4.2–5.8)
RBC # FLD: 18 % — HIGH (ref 10.3–14.5)
SARS-COV-2 IGG SERPL QL IA: NEGATIVE — SIGNIFICANT CHANGE UP
SARS-COV-2 IGM SERPL IA-ACNC: 0.11 INDEX — SIGNIFICANT CHANGE UP
SODIUM SERPL-SCNC: 131 MMOL/L — LOW (ref 135–145)
SODIUM SERPL-SCNC: 132 MMOL/L — LOW (ref 135–145)
TIBC SERPL-MCNC: 142 UG/DL — LOW (ref 220–430)
UIBC SERPL-MCNC: 43 UG/DL — LOW (ref 110–370)
VIT B12 SERPL-MCNC: >2000 PG/ML — HIGH (ref 232–1245)
WBC # BLD: 6.32 K/UL — SIGNIFICANT CHANGE UP (ref 3.8–10.5)
WBC # FLD AUTO: 6.32 K/UL — SIGNIFICANT CHANGE UP (ref 3.8–10.5)

## 2020-07-11 PROCEDURE — 99291 CRITICAL CARE FIRST HOUR: CPT

## 2020-07-11 PROCEDURE — 76700 US EXAM ABDOM COMPLETE: CPT | Mod: 26

## 2020-07-11 RX ORDER — ALBUMIN HUMAN 25 %
100 VIAL (ML) INTRAVENOUS ONCE
Refills: 0 | Status: COMPLETED | OUTPATIENT
Start: 2020-07-11 | End: 2020-07-11

## 2020-07-11 RX ORDER — POTASSIUM CHLORIDE 20 MEQ
40 PACKET (EA) ORAL ONCE
Refills: 0 | Status: COMPLETED | OUTPATIENT
Start: 2020-07-11 | End: 2020-07-11

## 2020-07-11 RX ORDER — MAGNESIUM SULFATE 500 MG/ML
2 VIAL (ML) INJECTION ONCE
Refills: 0 | Status: COMPLETED | OUTPATIENT
Start: 2020-07-11 | End: 2020-07-11

## 2020-07-11 RX ORDER — PREDNISOLONE 5 MG
40 TABLET ORAL DAILY
Refills: 0 | Status: DISCONTINUED | OUTPATIENT
Start: 2020-07-11 | End: 2020-07-12

## 2020-07-11 RX ORDER — LACTULOSE 10 G/15ML
200 SOLUTION ORAL ONCE
Refills: 0 | Status: COMPLETED | OUTPATIENT
Start: 2020-07-11 | End: 2020-07-11

## 2020-07-11 RX ADMIN — Medication 40 MILLIGRAM(S): at 12:17

## 2020-07-11 RX ADMIN — Medication 20 MILLIGRAM(S): at 06:35

## 2020-07-11 RX ADMIN — ERGOCALCIFEROL 50000 UNIT(S): 1.25 CAPSULE ORAL at 12:14

## 2020-07-11 RX ADMIN — MAGNESIUM OXIDE 400 MG ORAL TABLET 400 MILLIGRAM(S): 241.3 TABLET ORAL at 12:14

## 2020-07-11 RX ADMIN — LACTULOSE 200 GRAM(S): 10 SOLUTION ORAL at 22:30

## 2020-07-11 RX ADMIN — FAMOTIDINE 20 MILLIGRAM(S): 10 INJECTION INTRAVENOUS at 06:35

## 2020-07-11 RX ADMIN — Medication 1 MILLIGRAM(S): at 12:14

## 2020-07-11 RX ADMIN — Medication 40 MILLIEQUIVALENT(S): at 10:32

## 2020-07-11 RX ADMIN — Medication 50 MILLILITER(S): at 19:54

## 2020-07-11 RX ADMIN — SPIRONOLACTONE 150 MILLIGRAM(S): 25 TABLET, FILM COATED ORAL at 06:35

## 2020-07-11 RX ADMIN — AMLODIPINE BESYLATE 10 MILLIGRAM(S): 2.5 TABLET ORAL at 06:35

## 2020-07-11 RX ADMIN — Medication 2 MILLIGRAM(S): at 16:03

## 2020-07-11 RX ADMIN — Medication 25 MILLIGRAM(S): at 15:09

## 2020-07-11 RX ADMIN — Medication 50 GRAM(S): at 10:27

## 2020-07-11 RX ADMIN — Medication 100 MILLIGRAM(S): at 12:13

## 2020-07-11 RX ADMIN — Medication 50 GRAM(S): at 18:43

## 2020-07-11 NOTE — PROGRESS NOTE ADULT - PROBLEM SELECTOR PLAN 3
-new onset drop in H/H  -Hg 8.1 (previous 13.2 2019), HCT 24.2 (previous 37.4 2019)  -pt denies bleeding, hemoptysis, hematemesis, melena  -FOBT ordered  -anemia panel ordered for AM, f/u results  -vitamin K PO ordered  -trend CBC, PT/INR  -transfuse if Hg <7 -Tamara's DF: 172; MELD score of 34  -started prednisolone 40mg po daily fo acute alcoholic hepatitis  -GI consulted (Jaye), recs appreciated  -monitor PT/INR, t-bilirubin

## 2020-07-11 NOTE — PROGRESS NOTE ADULT - PROBLEM SELECTOR PLAN 9
SCDs due to bleeding risk  IMPROVE VTE Individual Risk Assessment          RISK                                                          Points  [  ] Previous VTE                                                3  [  ] Thrombophilia                                             2  [  ] Lower limb paralysis                                   2        (unable to hold up >15 seconds)    [  ] Current Cancer                                             2         (within 6 months)  [  ] Immobilization > 24 hrs                              1  [  ] ICU/CCU stay > 24 hours                             1  [  ] Age > 60                                                         1    IMPROVE VTE Score:         [    0     ]  Total Risk Factor Score:    0 - 1:   Consider IPC  >2 - 3:  Thromboprophylaxis required (enoxaparin or SQ heparin)        >4:   High Risk: Thromboprophylaxis required (enoxaparin or SQ heparin), optional add IPC  **If CONTRAINDICATION to enoxaparin or SQ heparin, USE IPCs** SCDs due to bleeding risk given coagulopathy

## 2020-07-11 NOTE — CONSULT NOTE ADULT - SUBJECTIVE AND OBJECTIVE BOX
Chief Complaint:  Patient is a 50y old  Male who presents with a chief complaint of alcohol withdrawl, elevated bilirubin (2020 10:31)    IBS (irritable bowel syndrome)  HTN (hypertension)  Ascites due to alcoholic cirrhosis  Cirrhosis of liver  ETOH abuse  Esophageal varices with bleeding  History of ankle surgery     HPI:  51 y/o M with a PMHx of alcohol dependence, liver cirrhosis, esophageal varices s/p banding in 2017, ascites s/p paracentesis 1 year ago, IBS, who comes in for detox and jaundice x 1 month. He states he relapsed in April after successfully quitting for 6 months previously. He has been admitted for detox multiple times in the past, in  he was admitted at [facility] and 2017 at [facility]. He denies seizures in the past from withdrawal however he does endorse shaking, N, V when he doesnt drinks for 2-3 days. Pt states he drinks 1 quart of vodka daily and his last drink was yesterday around 3 PM. He follows with GI Dr. Drew at [facility] regularly for his cirrhosis. He noticed he was getting jaundiced about 1 month ago. He states the last time this happened his T bili was also elevated just like today. He also endorses falling 1 week ago onto his right hip. He has been able to walk on it but has pain when standing for a long time. Denies shooting pain into his leg, numbness tingling. He endorses chills, difficulty with balance, N, V of clear liquid when he doesn't drink for 2-3 days. He denies current fevers, chills, neck pain, visual changes, CP, SOB, cough, palpitations, abdominal pain, diarrhea, dysuria. He endorses chronic balance difficulties, abdominal distention without pain, yellowing of his skin and eyes,   darkening of his urine, right hip pain.    ED course:  vitals: T 98.5,  --> 79, /74 --> 127/65, RR 18, SpO2 96% RA  significant labs: Hg 8.1 (last 13.2 in 2019), HCT 24.2 (last 37.4 in 2019), Na 129, K+ 3.4, Cl 93, , , T bili 23.5, Blood ETOH 30, PT/INR 42/3.8, U/A large bili, trace ketone, 30 protein, small blood,   imaging: CXR shows Grossly clear lungs  R hip Xray unofficial read: appears grossly normal  EKG: pending in ED (10 Jul 2020 21:51)      penicillin (Unknown)      amLODIPine   Tablet 10 milliGRAM(s) Oral daily  chlordiazePOXIDE 25 milliGRAM(s) Oral every 4 hours  ergocalciferol 30068 Unit(s) Oral every week  famotidine    Tablet 20 milliGRAM(s) Oral two times a day  folic acid 1 milliGRAM(s) Oral daily  furosemide    Tablet 20 milliGRAM(s) Oral daily  LORazepam   Injectable 2 milliGRAM(s) IV Push every 1 hour PRN  magnesium oxide 400 milliGRAM(s) Oral daily  multivitamin 1 Tablet(s) Oral daily  ondansetron Injectable 4 milliGRAM(s) IV Push every 6 hours PRN  prednisoLONE    3 mG/mL Solution (ORAPRED) 40 milliGRAM(s) Oral daily  propranolol 40 milliGRAM(s) Oral daily  rifAXIMin 550 milliGRAM(s) Oral two times a day  spironolactone 150 milliGRAM(s) Oral daily  thiamine 100 milliGRAM(s) Oral daily        FAMILY HISTORY:  FHx: COPD (chronic obstructive pulmonary disease): mother  FH: lung cancer: father        Review of Systems:    General:  No wt loss, fevers, chills, night sweats,fatigue,   Eyes:  Good vision, no reported pain  ENT:  No sore throat, pain, runny nose, dysphagia  CV:  No pain, palpitatioins, hypo/hypertension  Resp:  No dyspnea, cough, tachypnea, wheezing  :  No pain, bleeding, incontinence, nocturia  Muscle:  No pain, weakness  Neuro:  No weakness, tingling, memory problems  Psych:  No fatigue, insomnia, mood problems, depression  Endocrine:  No polyuria, polydypsia, cold/heat intolerance  Heme:  No petechiae, ecchymosis, easy bruisability  Skin:  No rash, tattoos, scars, edema    Relevant Family History:       Relevant Social History:       Physical Exam:    Vital Signs:  Vital Signs Last 24 Hrs  T(C): 36.4 (2020 14:38), Max: 37.2 (10 Jul 2020 17:24)  T(F): 97.5 (2020 14:38), Max: 99 (10 Jul 2020 17:24)  HR: 62 (2020 14:38) (62 - 111)  BP: 100/63 (2020 14:38) (100/63 - 162/74)  BP(mean): --  RR: 18 (2020 14:38) (18 - 18)  SpO2: 91% (2020 14:38) (91% - 96%)  Daily Height in cm: 172.72 (10 Jul 2020 17:24)    Daily Weight in k (2020 01:52)    General:  Appears stated age, well-groomed, well-nourished, no distress  HEENT:  NC/AT,  conjunctivae clear and pink, no thyromegaly, nodules, adenopathy, no JVD  Chest:  Full & symmetric excursion, no increased effort, breath sounds clear  Cardiovascular:  Regular rhythm, S1, S2, no murmur/rub/S3/S4, no abdominal bruit, no edema  Abdomen:  Soft, non-tender, non-distended, normoactive bowel sounds,  no masses ,no hepatosplenomeagaly, no signs of chronic liver disease  Extremities:  no cyanosis,clubbing or edema  Skin:  No rash/erythema/ecchymoses/petechiae/wounds/abscess/warm/dry  Neuro/Psych:  Alert, oriented, no asterixis, no tremor, no encephalopathy    Laboratory:                            7.2    6.32  )-----------( 52       ( 2020 06:10 )             21.1     07-11    131<L>  |  97  |  11  ----------------------------<  117<H>  3.4<L>   |  28  |  1.00    Ca    7.7<L>      2020 06:10  Phos  2.5     07-11  Mg     1.4     07-11    TPro  6.5  /  Alb  1.6<L>  /  TBili  22.6<HH>  /  DBili  x   /  AST  147<H>  /  ALT  52  /  AlkPhos  96  07-11    LIVER FUNCTIONS - ( 2020 06:10 )  Alb: 1.6 g/dL / Pro: 6.5 g/dL / ALK PHOS: 96 U/L / ALT: 52 U/L / AST: 147 U/L / GGT: x           PT/INR - ( 2020 06:10 )   PT: 44.5 sec;   INR: 4.07 ratio         PTT - ( 2020 06:10 )  PTT:50.4 sec  Urinalysis Basic - ( 10 Jul 2020 21:45 )    Color: Claudine / Appearance: Slightly Turbid / S.010 / pH: x  Gluc: x / Ketone: Trace  / Bili: Large / Urobili: 12   Blood: x / Protein: 30 mg/dL / Nitrite: Negative   Leuk Esterase: Negative / RBC: 0-2 /HPF / WBC 0-2   Sq Epi: x / Non Sq Epi: Occasional / Bacteria: Occasional      Amylase Serum--      Lipase [id number]       Ammonia--    Imaging:

## 2020-07-11 NOTE — PROGRESS NOTE ADULT - ATTENDING COMMENTS
50 year old man with Hx of EtOH cirrhosis, complicated by ascites, EV presenting with EtOH withdrawal and EtOH hepatitis with liver failure.      --currently obtunded after Ativan for high CIWA  will use symptom triggered ativan prn  CIWA monitoring  continue folate, MVI, thiamine  --HE, markedly elevated NH3  lactulose and xifaximen  may need NGT  --hemodynamically stable  cautiously continue anti-htn meds and diuretics  --seems to be protecting airway currently  --renal function acceptable  --EtOH hepatitis and liver failure  continue prednisilone   --no infectious concerns currently  --coagulopathy, no evidence of bleeding  continue supportive care  --ICU PA discussed with family.

## 2020-07-11 NOTE — PROGRESS NOTE ADULT - PROBLEM SELECTOR PLAN 6
-chronic, on home medications as above  -will continue with hold parameters and monitor for changes in liver fx and mental status -pt has h/o of paracentesis ~1 year ago with 2 L removed, 2/2 cirrhosis from chronic ETOH abuse  -no current abdominal discomfort or SOB or fever  -mod ascites on PE  -monitor for increase in distention, fevers, and abdominal pain

## 2020-07-11 NOTE — PROGRESS NOTE ADULT - SUBJECTIVE AND OBJECTIVE BOX
HISTORY  HPI:  51 y/o M with a PMHx of alcohol dependence, liver cirrhosis, esophageal varices s/p banding in 2017, ascites s/p paracentesis 1 year ago, IBS, who comes in for detox and jaundice x 1 month. He states he relapsed in April after successfully quitting for 6 months previously. He has been admitted for detox multiple times in the past, in 2014 he was admitted at [facility] and 2017 at [facility]. He denies seizures in the past from withdrawal however he does endorse shaking, N, V when he doesnt drinks for 2-3 days. Pt states he drinks 1 quart of vodka daily and his last drink was yesterday around 3 PM. He follows with GI Dr. Drew at [facility] regularly for his cirrhosis. He noticed he was getting jaundiced about 1 month ago. He states the last time this happened his T bili was also elevated just like today. He also endorses falling 1 week ago onto his right hip. He has been able to walk on it but has pain when standing for a long time. Denies shooting pain into his leg, numbness tingling. He endorses chills, difficulty with balance, N, V of clear liquid when he doesn't drink for 2-3 days. He denies current fevers, chills, neck pain, visual changes, CP, SOB, cough, palpitations, abdominal pain, diarrhea, dysuria. He endorses chronic balance difficulties, abdominal distention without pain, yellowing of his skin and eyes,   darkening of his urine, right hip pain.    ED course:  vitals: T 98.5,  --> 79, /74 --> 127/65, RR 18, SpO2 96% RA  significant labs: Hg 8.1 (last 13.2 in 2019), HCT 24.2 (last 37.4 in 2019), Na 129, K+ 3.4, Cl 93, , , T bili 23.5, Blood ETOH 30, PT/INR 42/3.8, U/A large bili, trace ketone, 30 protein, small blood,   imaging: CXR shows Grossly clear lungs  R hip Xray unofficial read: appears grossly normal  EKG: pending in ED (10 Jul 2020 21:51)      24 HOUR EVENTS:  This day patient grew more agitated and encephalopathic. Patient placed on CIWA scale with score of 16. He was on Librium 25mg, however patient required additional 2mg Ativan for patient and staff safety with good effect. Patient's last drink noted by sister to be 4 days PTA. Patient is also a tobacco user. He is followed by a liver specialist Dr. Drew affiliated with [facility]. He has not been eating well. Upon seeing patient he was somnolent. He was mimimally responsive to voice and painful stimuli. His ativan was given around 4pm with patient interaction noted to be around 445pm. This writer spoke with Sister and Girlfriend for all information.     SUBJECTIVE/ROS:  [ ] A ten-point review of systems was otherwise negative except as noted.  [ x] Due to altered mental status/intubation, subjective information were not able to be obtained from the patient. History was obtained, to the extent possible, from review of the chart and collateral sources of information.      NEURO  RASS:  -3  Exam:  Somnolent, sedated, encephalopathic  Meds: chlordiazePOXIDE 25 milliGRAM(s) Oral every 4 hours  LORazepam   Injectable 2 milliGRAM(s) IV Push every 1 hour PRN for ciwa > 8  ondansetron Injectable 4 milliGRAM(s) IV Push every 6 hours PRN Nausea    [x] Adequacy of sedation and pain control has been assessed and adjusted      RESPIRATORY  RR: 18 (07-11-20 @ 14:38) (18 - 18)  SpO2: 91% (07-11-20 @ 14:38) (91% - 96%)  Wt(kg): --  Exam: unlabored, clear to auscultation bilaterally  Mechanical Ventilation: NA    [NA ] Extubation Readiness Assessed  Meds:       CARDIOVASCULAR  HR: 62 (07-11-20 @ 14:38) (62 - 108)  BP: 100/63 (07-11-20 @ 14:38) (100/63 - 127/65)  BP(mean): --  ABP: --  ABP(mean): --  Wt(kg): --  CVP(cm H2O): --      Exam:  Cardiac Rhythm: S1S2, RRR  Perfusion     [ x]Adequate   [ ]Inadequate  Mentation   [ ]Normal       [x ]Reduced  Extremities  [xarm         [ ]Cool  Volume Status [ ]Hypervolemic [ x]Euvolemic [ ]Hypovolemic  Meds: amLODIPine   Tablet 10 milliGRAM(s) Oral daily  furosemide    Tablet 20 milliGRAM(s) Oral daily  propranolol 40 milliGRAM(s) Oral daily  spironolactone 150 milliGRAM(s) Oral daily        GI/NUTRITION  DIet: NPO  Exam: obese, tympanic, nontender  Meds: famotidine    Tablet 20 milliGRAM(s) Oral two times a day  lactulose Retention Enema 200 Gram(s) Rectal once      GENITOURINARY  I&O's Detail    07-10 @ 07:01  -  07-11 @ 07:00  --------------------------------------------------------  IN:    sodium chloride 0.9%: 450 mL  Total IN: 450 mL    OUT:  Total OUT: 0 mL    Total NET: 450 mL          07-11    132<L>  |  100  |  12  ----------------------------<  128<H>  3.8   |  24  |  1.20    Ca    8.0<L>      11 Jul 2020 16:24  Phos  2.5     07-11  Mg     1.9     07-11    TPro  6.5  /  Alb  1.6<L>  /  TBili  22.6<HH>  /  DBili  x   /  AST  147<H>  /  ALT  52  /  AlkPhos  96  07-11    [ ] Up catheter, indication: N/A  Meds: ergocalciferol 10436 Unit(s) Oral every week  folic acid 1 milliGRAM(s) Oral daily  magnesium oxide 400 milliGRAM(s) Oral daily  multivitamin 1 Tablet(s) Oral daily  thiamine 100 milliGRAM(s) Oral daily        HEMATOLOGIC  Meds:   [x] VTE Prophylaxis                        7.2    6.32  )-----------( 52       ( 11 Jul 2020 06:10 )             21.1     PT/INR - ( 11 Jul 2020 06:10 )   PT: 44.5 sec;   INR: 4.07 ratio         PTT - ( 11 Jul 2020 06:10 )  PTT:50.4 sec  Transfusion     [ ] PRBC   [ ] Platelets   [ ] FFP   [ ] Cryoprecipitate      INFECTIOUS DISEASES  T(C): 36.4 (07-11-20 @ 14:38), Max: 37 (07-10-20 @ 23:19)  Wt(kg): --  WBC Count: 6.32 K/uL (07-11 @ 06:10)  WBC Count: 7.04 K/uL (07-10 @ 19:38)    Recent Cultures:    Meds: rifAXIMin 550 milliGRAM(s) Oral two times a day        CAPILLARY BLOOD GLUCOSE    None      Meds: prednisoLONE    3 mG/mL Solution (ORAPRED) 40 milliGRAM(s) Oral daily        ACCESS DEVICES:  [x ] Peripheral IV  [ ] Central Venous Line	[ ] R	[ ] L	[ ] IJ	[ ] Fem	[ ] SC	Placed:   [ ] Arterial Line		[ ] R	[ ] L	[ ] Fem	[ ] Rad	[ ] Ax	Placed:   [ ] PICC:					[ ] Mediport  [ ] Urinary Catheter, Date Placed:   [ ] Necessity of urinary, arterial, and venous catheters discussed    OTHER MEDICATIONS:      CODE STATUS: Full    IMAGING:

## 2020-07-11 NOTE — PROGRESS NOTE ADULT - PROBLEM SELECTOR PLAN 2
-chronic ETOH abuse since for >20 years, denies h/o seizures from withdrawal  -drinks 1 quart vodka daily  -h/o cirrhosis on home medications  -h/o bleeding esophageal varices with banding in 2017  -type and screen ordered  -monitor for HD stability  -CIWA protocol  -will keep on Librium for now due to ADR to ativan, monitor worsening Liver fx -alcohol abuse with dependence chronic ETOH abuse >20 years  -drinks 1 quart vodka daily  -rapidly deteriorated from this morning to the afternoon and went from hand tremors to full DTs  -ICU consulted and pt accepted  -addiction medicine consult (Leatha), rec librium at a relatively low dose of 25mg with close monitoring for lethargy.   -in setting of liver failure, and pt's worsening encephalopathy switched to ativan along with ICU  CIWA protocol.

## 2020-07-11 NOTE — CONSULT NOTE ADULT - PROBLEM SELECTOR RECOMMENDATION 9
CIWA protocol  Monitor elytes   start prednisolone pt with high Maddrey and MELD  SBP prophylaxis  Monitor Coags  Vit K   will likely need transfer to [facility]   monitor renal function

## 2020-07-11 NOTE — CONSULT NOTE ADULT - SUBJECTIVE AND OBJECTIVE BOX
Date/Time Patient Seen:  		  Referring MD:   Data Reviewed	       Patient is a 50y old  Male who presents with a chief complaint of alcohol withdrawl, elevated bilirubin (10 Jul 2020 21:51)      Subjective/HPI  in bed  seen and examined  vs and meds reviewed  labs reviewed  old records reviewed  h and p reviewed  er provider note reviewed  alert  verbal  known cirrhosis  last drink on Thursday  lives with mom and sister   by trade  smokes and drinks -   drinks VODKA - in quarts - size wise -   History and Physical:   Outpatient Providers	PCP: [facility]  GI: Dr. Drew     Patient Identity:  · Birth Sex	Male       History of Present Illness:  Reason for Admission: alcohol withdrawl, elevated bilirubin  History of Present Illness:   51 y/o M with a PMHx of alcohol dependence, liver cirrhosis, esophageal varices s/p banding in 2017, ascites s/p paracentesis 1 year ago, IBS, who comes in for detox and jaundice x 1 month. He states he relapsed in April after successfully quitting for 6 months previously. He has been admitted for detox multiple times in the past, in  he was admitted at [facility] and 2017 at [facility]. He denies seizures in the past from withdrawal however he does endorse shaking, N, V when he doesnt drinks for 2-3 days. Pt states he drinks 1 quart of vodka daily and his last drink was yesterday around 3 PM. He follows with GI Dr. Drew at [facility] regularly for his cirrhosis. He noticed he was getting jaundiced about 1 month ago. He states the last time this happened his T bili was also elevated just like today. He also endorses falling 1 week ago onto his right hip. He has been able to walk on it but has pain when standing for a long time. Denies shooting pain into his leg, numbness tingling. He endorses chills, difficulty with balance, N, V of clear liquid when he doesn't drink for 2-3 days. He denies current fevers, chills, neck pain, visual changes, CP, SOB, cough, palpitations, abdominal pain, diarrhea, dysuria. He endorses chronic balance difficulties, abdominal distention without pain, yellowing of his skin and eyes,   darkening of his urine, right hip pain.    ED course:  vitals: T 98.5,  --> 79, /74 --> 127/65, RR 18, SpO2 96% RA  significant labs: Hg 8.1 (last 13.2 in 2019), HCT 24.2 (last 37.4 in 2019), Na 129, K+ 3.4, Cl 93, , , T bili 23.5, Blood ETOH 30, PT/INR 42/3.8, U/A large bili, trace ketone, 30 protein, small blood,   imaging: CXR shows Grossly clear lungs  R hip Xray unofficial read: appears grossly normal  EKG: pending in ED  PAST SURGICAL HISTORY:  History of ankle surgery.     FAMILY HISTORY:  FH: lung cancer, father  FHx: COPD (chronic obstructive pulmonary disease), mother.     Social History:  Social History (marital status, living situation, occupation, tobacco use, alcohol and drug use, and sexual history): Tobacco: current smoker since 19 y.o. 1/ PPD  	EtOH: chronic, 1 quart vodka daily  	Recreational drug use: denies  	Lives with: mother and sister  	Ambulates: independently   	ADLs: independently, help from family  Vaccinations: UTD with flu shots     Tobacco Screening:  · Core Measure Site	Yes  · Has the patient used tobacco in the past 30 days?	Yes  · Tobacco Cessation Education/Counseling	Offered and patient declined  · Tobacco Cessation Medication	Offered and patient declined    Risk Assessment:    Present on Admission:  Deep Venous Thrombosis	no  Pulmonary Embolus	no     Heart Failure:  Does this patient have a history of or has been diagnosed with heart failure? no.     PAST MEDICAL & SURGICAL HISTORY:  IBS (irritable bowel syndrome)  HTN (hypertension)  Ascites due to alcoholic cirrhosis  Cirrhosis of liver  ETOH abuse  Esophageal varices with bleedin2017  History of ankle surgery        Medication list         MEDICATIONS  (STANDING):  amLODIPine   Tablet 10 milliGRAM(s) Oral daily  chlordiazePOXIDE 25 milliGRAM(s) Oral every 4 hours  ergocalciferol 95243 Unit(s) Oral every week  famotidine    Tablet 20 milliGRAM(s) Oral two times a day  folic acid 1 milliGRAM(s) Oral daily  furosemide    Tablet 20 milliGRAM(s) Oral daily  magnesium oxide 400 milliGRAM(s) Oral daily  magnesium sulfate  IVPB 2 Gram(s) IV Intermittent once  potassium chloride   Powder 40 milliEquivalent(s) Oral once  prednisoLONE    3 mG/mL Solution (ORAPRED) 40 milliGRAM(s) Oral daily  propranolol 40 milliGRAM(s) Oral daily  rifAXIMin 550 milliGRAM(s) Oral two times a day  sodium chloride 0.9%. 1000 milliLiter(s) (75 mL/Hr) IV Continuous <Continuous>  spironolactone 150 milliGRAM(s) Oral daily  thiamine 100 milliGRAM(s) Oral daily    MEDICATIONS  (PRN):  LORazepam   Injectable 2 milliGRAM(s) IV Push every 1 hour PRN for ciwa > 8  ondansetron Injectable 4 milliGRAM(s) IV Push every 6 hours PRN Nausea         Vitals log        ICU Vital Signs Last 24 Hrs  T(C): 36.7 (2020 04:25), Max: 37.2 (10 Jul 2020 17:24)  T(F): 98 (2020 04:25), Max: 99 (10 Jul 2020 17:24)  HR: 108 (2020 06:25) (79 - 111)  BP: 110/67 (2020 06:25) (108/72 - 162/74)  BP(mean): --  ABP: --  ABP(mean): --  RR: 18 (2020 04:25) (18 - 18)  SpO2: 93% (2020 04:25) (93% - 96%)           Input and Output:  I&O's Detail    10 Jul 2020 07:01  -  2020 07:00  --------------------------------------------------------  IN:    sodium chloride 0.9%.: 450 mL  Total IN: 450 mL    OUT:  Total OUT: 0 mL    Total NET: 450 mL          Lab Data                        7.2    6.32  )-----------( 52       ( 2020 06:10 )             21.1     07-11    131<L>  |  97  |  11  ----------------------------<  117<H>  3.4<L>   |  28  |  1.00    Ca    7.7<L>      2020 06:10  Phos  2.5     07-11  Mg     1.4     07-11    TPro  6.5  /  Alb  1.6<L>  /  TBili  22.6<HH>  /  DBili  x   /  AST  147<H>  /  ALT  52  /  AlkPhos  96  07-11            Review of Systems	  drinker  smoker      Objective     Physical Examination  heart s1s2  lung dec BS  abd dist  ascites  alert  verbal  cn grossly int  moves all extr  jaundice        Pertinent Lab findings & Imaging      Up:  NO   Adequate UO     I&O's Detail    10 Jul 2020 07:01  -  2020 07:00  --------------------------------------------------------  IN:    sodium chloride 0.9%.: 450 mL  Total IN: 450 mL    OUT:  Total OUT: 0 mL    Total NET: 450 mL               Discussed with:     Cultures:	        Radiology            EXAM:  XR CHEST AP OR PA 1V                            PROCEDURE DATE:  07/10/2020          INTERPRETATION:  Admission, detox.    AP chest.    Heart magnified by AP film shallow inspiration. Grossly clear lungs. No consolidation or effusion.    Impression: Grossly clear lungs                JANES LEONARD M.D., ATTENDING RADIOLOGIST  This document has been electronically signed. Jul 10 2020  8:08PM        EXAM:  US ABDOMEN COMPLETE        PROCEDURE DATE:  10/26/2019           INTERPRETATION:  CLINICAL INFORMATION: Cirrhosis    COMPARISON: Abdominal ultrasound dated 2019.    TECHNIQUE: Sonography of the abdomen.     FINDINGS:    Liver: Nodular contour and coarse in echotexture consistent with   cirrhosis. No focal hepatic lesion. Recanalized umbilical vein.    Bile ducts: Normal caliber. Common bile duct measures 7 mm.     Gallbladder: Gallstones. Minimal diffuse gallbladder wall thickening   likely secondary to cirrhosis. Pancreas: Poorly visualized.    Spleen: 14.7 cm. Enlarged.    Right kidney: 10.7 cm. No hydronephrosis.    Left kidney: 12.3 cm.  No hydronephrosis.    Ascites: Trace.    Aorta and IVC: Visualized portions are within normal limits.    IMPRESSION:     Cirrhosis. No focal hepatic lesion.    Mild splenomegaly and recanalized para medical vein consistent with   portal hypertension.    Cholelithiasis.                     MICHELLE ISBELL M.D., ATTENDING RADIOLOGIST Phone #: [phone]  This document has been electronically signed. Oct 26 2019  1:22PM

## 2020-07-11 NOTE — PROGRESS NOTE ADULT - PROBLEM SELECTOR PLAN 4
-Na 129, K+ 3.4  -pt endorses chronic hyponatremia 2/2 cirrhosis  -denies current confusion, AMS, seizures  -neuro checks and seizure protocol in place  -IV fluids NS 75 cc/hr, 1 L NS bolus given in ED, will proceed judiciously with IVF, patient may need to be placed on fluid restriction and consider Na repletion if worsen Na or concerns for neurological impairment due to hypoNa  -potassium chloride 40 mEq PO x 1 ordered  -f/u AM CMP -Hg 7.2 (previous 13.2 2019),   -pt denies bleeding, hemoptysis, hematemesis, melena, hematuria  -FOBT ordered  -anemia panel --B12 and folate are normal. Ferritin >700. -- likely does not require any supplementation for anemia  -vitamin K PO ordered  -trend CBC, PT/INR  -transfuse if Hg <7

## 2020-07-11 NOTE — PROGRESS NOTE ADULT - ASSESSMENT
51yo male presenting with:    1. Acute ETOh withdrawal  2. Toxic Metabolic encephalopathy  3. Acute on chronic liver injury  4. Cirrhosis  5. ETOH dependence    Plan:  Patient will be transferred to the ICU for further management of ETOH withdrawal. Will minimize use of Librium (currently with 25mg Q6hr dosing) as well as ativan prn as needed for withdrawal. Patient's family with concern regarding Ativan use due to "adverse reaction of hallucination" in the past. Explained to family we will be mindful prior to utilizing benzodiazepines, however his agitation in the past ([facility] [place] with 12 day ICU stay) bay have been multifactorial and not solely due to Ativan injection.  Will contact Dr. Drew for further history and management recommendations regarding with liver disease. Patient with elevated ammonia level and surely encephalopathic. Will attempt to maintain sleep/wake cycle, continue to orient patient as able. Lactulose as prescribed. Spironolactone, Lasix, Propranolol to be continued. Strict I&Os. Maintain net negative fluid status. Monitor abdominal exam and cirrhosis for possible need of paracentesis. Patient with history of paracentesis need in past. Continue with MVI, Folate and Thiamine as prescribed. Patient with worsening liver failure and is high risk for deterioration and/or death due to progression of chronic disease. Will additionally need to monitor respiratory status and ensure patient able to protect his airway. He is a full code and if deteriorates would require intubation. Will maintain patient as NPO for now until he is more lucid and able to tolerate. Aspiration and fall precautions to be instituted.     Critical Care time spent excluding that time which is considered bundled and/or procedural - 42 minutes. 49yo male presenting with:    1. Acute ETOh withdrawal  2. Toxic Metabolic encephalopathy  3. Acute on chronic liver injury  4. Cirrhosis  5. ETOH dependence    Plan:  Patient will be transferred to the ICU for further management of ETOH withdrawal. Will minimize use of Librium (currently with 25mg Q6hr dosing) as well as ativan prn as needed for withdrawal. Patient's family with concern regarding Ativan use due to "adverse reaction of hallucination" in the past. Explained to family we will be mindful prior to utilizing benzodiazepines, however his agitation in the past ([facility] [place] with 12 day ICU stay) bay have been multifactorial and not solely due to Ativan injection.  Will contact Dr. Drew for further history and management recommendations regarding with liver disease. Patient with elevated ammonia level and surely encephalopathic. Will attempt to maintain sleep/wake cycle, continue to orient patient as able. Lactulose as prescribed. Spironolactone, Lasix, Propranolol to be continued. Strict I&Os. Maintain net negative fluid status. Monitor abdominal exam and cirrhosis for possible need of paracentesis. Will order abdominal ultrasound to evaluate liver pathology and degree of ascites. Patient with history of paracentesis need in past. Continue with MVI, Folate and Thiamine as prescribed. Patient with worsening liver failure and is high risk for deterioration and/or death due to progression of chronic disease. Will additionally need to monitor respiratory status and ensure patient able to protect his airway. He is a full code and if deteriorates would require intubation. Will maintain patient as NPO for now until he is more lucid and able to tolerate. Aspiration and fall precautions to be instituted.     Critical Care time spent excluding that time which is considered bundled and/or procedural - 42 minutes.

## 2020-07-11 NOTE — PROGRESS NOTE ADULT - PROBLEM SELECTOR PLAN 5
-pt has h/o of paracentesis 1 year ago with 2 L removed, 2/2 cirrhosis from chronic ETOH abuse  -no current abdominal discomfort  -mod ascites on PE  -monitor for increase in distention, fevers, and abdominal pain  -mild asterixes on exam but no overt sign of HE -hyponatremia, hypokalemia, hypomagnesemia   -pt endorses chronic hyponatremia 2/2 cirrhosis  -repleted Mg and Phos  -f/u lytes  -given QT prolongation, will aim for Mg > 2.

## 2020-07-11 NOTE — PROGRESS NOTE ADULT - PROBLEM SELECTOR PLAN 7
-s/p bleeding esophageal varices banding in 2017  -on nadolol at home, will continue inpatient with hold parameters  -type and screen ordered  -PT/INR elevated  -vitamin K ordered  -pt currently stable and comfortable with no active N or V  -monitor for new onset hemoptysis or HD instability -s/p bleeding esophageal varices banding in 2017  -on nadolol at home, will continue inpatient with hold parameters  -type and screen ordered  -PT/INR elevated, trend platelets  -vitamin K ordered  -pt currently stable and comfortable with no active nausea, vomiting or other sign of GIB  -monitor for new onset hemoptysis or HD instability

## 2020-07-11 NOTE — PROGRESS NOTE ADULT - PROBLEM SELECTOR PLAN 1
-pt with h/o cirrhosis, esophageal varices s/p banding in 2017, 2/2 chronic alcoholism  -multiple hospitalizations for detox and withdraw, no h/o seizures  -of note pt has reacted badly to ativan, became very violent with hallucinations, had a better experience with Librium  -h/o cirrhosis on home medications, spironolactone, furosemide, nadolol, continue inpatient with hold parameters, propranolol alternative for nadolol  -Librium 50 PO x 1 in ED, continue for symptom triggered  -famotidine 20 IV x 1 in ED, continue PO famotidine inpatient  -folic acid 1 mg x 1 PO in ED, continue  -zofran 4 IV x 1 in ED, continue PRN for nausea  -thiamine 100 IV x 1 in ED, continue  -vitamin K PO ordered  -T bili 23.5, , ALT 60, Alb 1.7  -PT/INR 42/3.8, f/u AM  -type and screen ordered  -monitor HD stability and new onset bleeding varices  -[facility] protocol  -consider GI consult if pt decompensates -acute on chronic liver failure   -pt with h/o cirrhosis, esophageal varices s/p banding in 2017, hx of ascites s/p therapeutic paracentesis ~1 year ago, 2/2 chronic alcoholism  -h/o cirrhosis on home medications, spironolactone, furosemide, nadolol, continue inpatient with hold parameters, propranolol alternative for nadolol  -Maddrey's DF: 172; MELD score of 34  -started prednisolone 40mg po daily fo acute alcoholic hepatitis  -checked ammonia level which was elevated to 142 -- pt's rapidly worsened delirium (was mentating well few hours ago) makes alcohol withdrawal likely to be a bigger contributor, but will also treat with lactulose enema and rifaximin for the acute hepatic encephalopathy.   -famotidine 20 IV x 1 in ED, continue PO famotidine inpatient  -folic acid 1 mg x 1 PO in ED, continue  -zofran 4 IV x 1 in ED, continue PRN for nausea  -thiamine 100 IV x 1 in ED, continue  -vitamin K PO ordered  -T bili 23.5, , ALT 60, Alb 1.7  -monitor PT/INR, LFTs, ammonia, CBC  -[facility] protocol  -GI consulted (Jaye), recs appreciated

## 2020-07-11 NOTE — PROGRESS NOTE ADULT - SUBJECTIVE AND OBJECTIVE BOX
Patient is a 50y old  Male who presents with a chief complaint of alcohol withdrawal, jaundice.       INTERVAL HPI/OVERNIGHT EVENTS: Pt was completely lucid, AA&Ox3 until this afternoon at which point he became delirious. ICU consulted.    MEDICATIONS  (STANDING):  amLODIPine   Tablet 10 milliGRAM(s) Oral daily  chlordiazePOXIDE 25 milliGRAM(s) Oral every 4 hours  ergocalciferol 83522 Unit(s) Oral every week  famotidine    Tablet 20 milliGRAM(s) Oral two times a day  folic acid 1 milliGRAM(s) Oral daily  furosemide    Tablet 20 milliGRAM(s) Oral daily  lactulose Retention Enema 200 Gram(s) Rectal once  magnesium oxide 400 milliGRAM(s) Oral daily  multivitamin 1 Tablet(s) Oral daily  prednisoLONE    3 mG/mL Solution (ORAPRED) 40 milliGRAM(s) Oral daily  propranolol 40 milliGRAM(s) Oral daily  rifAXIMin 550 milliGRAM(s) Oral two times a day  spironolactone 150 milliGRAM(s) Oral daily  thiamine 100 milliGRAM(s) Oral daily    MEDICATIONS  (PRN):  LORazepam   Injectable 2 milliGRAM(s) IV Push every 1 hour PRN for ciwa > 8  ondansetron Injectable 4 milliGRAM(s) IV Push every 6 hours PRN Nausea      Allergies    penicillin (Unknown)    Intolerances        REVIEW OF SYSTEMS: when pt was lucid (this morning)   CONSTITUTIONAL: Denies fever or chills  HEENT:  No headache, no sore throat  RESPIRATORY: No cough, wheezing, or shortness of breath  CARDIOVASCULAR: No chest pain, palpitations  GASTROINTESTINAL: No abd pain, nausea, vomiting, or diarrhea; +admits distension  GENITOURINARY: No dysuria, frequency, or hematuria  NEUROLOGICAL: no focal weakness or dizziness  MUSCULOSKELETAL: no myalgias   PSYCH: pt reports starting to have shakes / anxiety typical for his withdrawal     Vital Signs Last 24 Hrs  T(C): 36.4 (2020 14:38), Max: 37.2 (10 Jul 2020 17:24)  T(F): 97.5 (2020 14:38), Max: 99 (10 Jul 2020 17:24)  HR: 62 (2020 14:38) (62 - 111)  BP: 100/63 (2020 14:38) (100/63 - 162/74)  BP(mean): --  RR: 18 (2020 14:38) (18 - 18)  SpO2: 91% (2020 14:38) (91% - 96%)    PHYSICAL EXAM: (in the morning)  GENERAL: NAD, ++jaundiced  HEENT:  +icteric, moist mucous membranes  CHEST/LUNG:  CTA b/l, no rales, wheezes, or rhonchi  HEART:  RRR, S1, S2  ABDOMEN:  BS+, soft, nontender, +distended; +ascites  EXTREMITIES: no cyanosis or calf tenderness  NERVOUS SYSTEM: AA&Ox3 in the morning (became AA&Ox1 and delirious in the afternoon)  PSYCH: +hand tremors    LABS:                        7.2    6.32  )-----------( 52       ( 2020 06:10 )             21.1     CBC Full  -  ( 2020 06:10 )  WBC Count : 6.32 K/uL  Hemoglobin : 7.2 g/dL  Hematocrit : 21.1 %  Platelet Count - Automated : 52 K/uL  Mean Cell Volume : 117.9 fl  Mean Cell Hemoglobin : 40.2 pg  Mean Cell Hemoglobin Concentration : 34.1 gm/dL  Auto Neutrophil # : 3.44 K/uL  Auto Lymphocyte # : 1.17 K/uL  Auto Monocyte # : 1.31 K/uL  Auto Eosinophil # : 0.18 K/uL  Auto Basophil # : 0.07 K/uL  Auto Neutrophil % : 54.5 %  Auto Lymphocyte % : 18.5 %  Auto Monocyte % : 20.7 %  Auto Eosinophil % : 2.8 %  Auto Basophil % : 1.1 %    2020 16:24    132    |  100    |  12     ----------------------------<  128    3.8     |  24     |  1.20     Ca    8.0        2020 16:24  Phos  2.5       2020 06:10  Mg     1.9       2020 16:24    TPro  6.5    /  Alb  1.6    /  TBili  22.6   /  DBili  x      /  AST  147    /  ALT  52     /  AlkPhos  96     2020 06:10    PT/INR - ( 2020 06:10 )   PT: 44.5 sec;   INR: 4.07 ratio         PTT - ( 2020 06:10 )  PTT:50.4 sec  Urinalysis Basic - ( 10 Jul 2020 21:45 )    Color: Claudine / Appearance: Slightly Turbid / S.010 / pH: x  Gluc: x / Ketone: Trace  / Bili: Large / Urobili: 12   Blood: x / Protein: 30 mg/dL / Nitrite: Negative   Leuk Esterase: Negative / RBC: 0-2 /HPF / WBC 0-2   Sq Epi: x / Non Sq Epi: Occasional / Bacteria: Occasional      CAPILLARY BLOOD GLUCOSE              RADIOLOGY & ADDITIONAL TESTS:    Personally reviewed.     Consultant(s) Notes Reviewed:  [x] YES  [ ] NO Patient is a 50y old  Male who presents with a chief complaint of alcohol withdrawal, jaundice.     INTERVAL HPI/OVERNIGHT EVENTS: Pt was completely lucid, AA&Ox3 until this afternoon at which point he became delirious. ICU consulted. Earlier, pt admitted to shakes and mild anxiety, and noted his own jaundice, but stated he otherwise felt well. Denies fever, chills, abd pain, SOB, CP, palpitations, nausea, vomiting, melena, hematochezia.     MEDICATIONS  (STANDING):  amLODIPine   Tablet 10 milliGRAM(s) Oral daily  chlordiazePOXIDE 25 milliGRAM(s) Oral every 4 hours  ergocalciferol 45964 Unit(s) Oral every week  famotidine    Tablet 20 milliGRAM(s) Oral two times a day  folic acid 1 milliGRAM(s) Oral daily  furosemide    Tablet 20 milliGRAM(s) Oral daily  lactulose Retention Enema 200 Gram(s) Rectal once  magnesium oxide 400 milliGRAM(s) Oral daily  multivitamin 1 Tablet(s) Oral daily  prednisoLONE    3 mG/mL Solution (ORAPRED) 40 milliGRAM(s) Oral daily  propranolol 40 milliGRAM(s) Oral daily  rifAXIMin 550 milliGRAM(s) Oral two times a day  spironolactone 150 milliGRAM(s) Oral daily  thiamine 100 milliGRAM(s) Oral daily    MEDICATIONS  (PRN):  LORazepam   Injectable 2 milliGRAM(s) IV Push every 1 hour PRN for ciwa > 8  ondansetron Injectable 4 milliGRAM(s) IV Push every 6 hours PRN Nausea      Allergies    penicillin (Unknown)    Intolerances        REVIEW OF SYSTEMS: when pt was lucid (this morning)   CONSTITUTIONAL: Denies fever or chills  HEENT:  No headache, no sore throat  RESPIRATORY: No cough, wheezing, or shortness of breath  CARDIOVASCULAR: No chest pain, palpitations  GASTROINTESTINAL: No abd pain, nausea, vomiting, or diarrhea; +admits distension  GENITOURINARY: No dysuria, frequency, or hematuria  NEUROLOGICAL: no focal weakness or dizziness  MUSCULOSKELETAL: no myalgias   PSYCH: pt reports starting to have shakes / anxiety typical for his withdrawal     Vital Signs Last 24 Hrs  T(C): 36.4 (2020 14:38), Max: 37.2 (10 Jul 2020 17:24)  T(F): 97.5 (2020 14:38), Max: 99 (10 Jul 2020 17:24)  HR: 62 (2020 14:38) (62 - 111)  BP: 100/63 (2020 14:38) (100/63 - 162/74)  BP(mean): --  RR: 18 (2020 14:38) (18 - 18)  SpO2: 91% (2020 14:38) (91% - 96%)    PHYSICAL EXAM: (in the morning)  GENERAL: NAD, ++jaundiced  HEENT:  +icteric, moist mucous membranes  CHEST/LUNG:  CTA b/l, no rales, wheezes, or rhonchi  HEART:  RRR, S1, S2  ABDOMEN:  BS+, soft, nontender, +distended; +ascites  EXTREMITIES: no cyanosis or calf tenderness  NERVOUS SYSTEM: AA&Ox3 in the morning (became AA&Ox1 and delirious in the afternoon)  PSYCH: +hand tremors    LABS:                        7.2    6.32  )-----------( 52       ( 2020 06:10 )             21.1     CBC Full  -  ( 2020 06:10 )  WBC Count : 6.32 K/uL  Hemoglobin : 7.2 g/dL  Hematocrit : 21.1 %  Platelet Count - Automated : 52 K/uL  Mean Cell Volume : 117.9 fl  Mean Cell Hemoglobin : 40.2 pg  Mean Cell Hemoglobin Concentration : 34.1 gm/dL  Auto Neutrophil # : 3.44 K/uL  Auto Lymphocyte # : 1.17 K/uL  Auto Monocyte # : 1.31 K/uL  Auto Eosinophil # : 0.18 K/uL  Auto Basophil # : 0.07 K/uL  Auto Neutrophil % : 54.5 %  Auto Lymphocyte % : 18.5 %  Auto Monocyte % : 20.7 %  Auto Eosinophil % : 2.8 %  Auto Basophil % : 1.1 %    2020 16:24    132    |  100    |  12     ----------------------------<  128    3.8     |  24     |  1.20     Ca    8.0        2020 16:24  Phos  2.5       2020 06:10  Mg     1.9       2020 16:24    TPro  6.5    /  Alb  1.6    /  TBili  22.6   /  DBili  x      /  AST  147    /  ALT  52     /  AlkPhos  96     2020 06:10    PT/INR - ( 2020 06:10 )   PT: 44.5 sec;   INR: 4.07 ratio         PTT - ( 2020 06:10 )  PTT:50.4 sec  Urinalysis Basic - ( 10 Jul 2020 21:45 )    Color: Claudine / Appearance: Slightly Turbid / S.010 / pH: x  Gluc: x / Ketone: Trace  / Bili: Large / Urobili: 12   Blood: x / Protein: 30 mg/dL / Nitrite: Negative   Leuk Esterase: Negative / RBC: 0-2 /HPF / WBC 0-2   Sq Epi: x / Non Sq Epi: Occasional / Bacteria: Occasional      CAPILLARY BLOOD GLUCOSE              RADIOLOGY & ADDITIONAL TESTS:    Personally reviewed.     Consultant(s) Notes Reviewed:  [x] YES  [ ] NO Patient is a 50y old  Male who presents with a chief complaint of alcohol withdrawal, jaundice.     INTERVAL HPI/OVERNIGHT EVENTS: Pt was completely lucid, AA&Ox3 until this afternoon at which point he became delirious. ICU consulted and pt was accepted to the ICU. Earlier, pt admitted to shakes and mild anxiety, and noted his own jaundice, but stated he otherwise felt well. Denies fever, chills, abd pain, SOB, CP, palpitations, nausea, vomiting, melena, hematochezia.     MEDICATIONS  (STANDING):  amLODIPine   Tablet 10 milliGRAM(s) Oral daily  chlordiazePOXIDE 25 milliGRAM(s) Oral every 4 hours  ergocalciferol 27429 Unit(s) Oral every week  famotidine    Tablet 20 milliGRAM(s) Oral two times a day  folic acid 1 milliGRAM(s) Oral daily  furosemide    Tablet 20 milliGRAM(s) Oral daily  lactulose Retention Enema 200 Gram(s) Rectal once  magnesium oxide 400 milliGRAM(s) Oral daily  multivitamin 1 Tablet(s) Oral daily  prednisoLONE    3 mG/mL Solution (ORAPRED) 40 milliGRAM(s) Oral daily  propranolol 40 milliGRAM(s) Oral daily  rifAXIMin 550 milliGRAM(s) Oral two times a day  spironolactone 150 milliGRAM(s) Oral daily  thiamine 100 milliGRAM(s) Oral daily    MEDICATIONS  (PRN):  LORazepam   Injectable 2 milliGRAM(s) IV Push every 1 hour PRN for ciwa > 8  ondansetron Injectable 4 milliGRAM(s) IV Push every 6 hours PRN Nausea      Allergies    penicillin (Unknown)    Intolerances        REVIEW OF SYSTEMS: when pt was lucid (this morning)   CONSTITUTIONAL: Denies fever or chills  HEENT:  No headache, no sore throat  RESPIRATORY: No cough, wheezing, or shortness of breath  CARDIOVASCULAR: No chest pain, palpitations  GASTROINTESTINAL: No abd pain, nausea, vomiting, or diarrhea; +admits distension  GENITOURINARY: No dysuria, frequency, or hematuria  NEUROLOGICAL: no focal weakness or dizziness  MUSCULOSKELETAL: no myalgias   PSYCH: pt reports starting to have shakes / anxiety typical for his withdrawal     Vital Signs Last 24 Hrs  T(C): 36.4 (2020 14:38), Max: 37.2 (10 Jul 2020 17:24)  T(F): 97.5 (2020 14:38), Max: 99 (10 Jul 2020 17:24)  HR: 62 (2020 14:38) (62 - 111)  BP: 100/63 (2020 14:38) (100/63 - 162/74)  BP(mean): --  RR: 18 (2020 14:38) (18 - 18)  SpO2: 91% (2020 14:38) (91% - 96%)    PHYSICAL EXAM: (in the morning)  GENERAL: NAD, ++jaundiced  HEENT:  +icteric, moist mucous membranes  CHEST/LUNG:  CTA b/l, no rales, wheezes, or rhonchi  HEART:  RRR, S1, S2  ABDOMEN:  BS+, soft, nontender, +distended; +ascites  EXTREMITIES: no cyanosis or calf tenderness  NERVOUS SYSTEM: AA&Ox3 in the morning (became AA&Ox1 and delirious in the afternoon)  PSYCH: +hand tremors    LABS:                        7.2    6.32  )-----------( 52       ( 2020 06:10 )             21.1     CBC Full  -  ( 2020 06:10 )  WBC Count : 6.32 K/uL  Hemoglobin : 7.2 g/dL  Hematocrit : 21.1 %  Platelet Count - Automated : 52 K/uL  Mean Cell Volume : 117.9 fl  Mean Cell Hemoglobin : 40.2 pg  Mean Cell Hemoglobin Concentration : 34.1 gm/dL  Auto Neutrophil # : 3.44 K/uL  Auto Lymphocyte # : 1.17 K/uL  Auto Monocyte # : 1.31 K/uL  Auto Eosinophil # : 0.18 K/uL  Auto Basophil # : 0.07 K/uL  Auto Neutrophil % : 54.5 %  Auto Lymphocyte % : 18.5 %  Auto Monocyte % : 20.7 %  Auto Eosinophil % : 2.8 %  Auto Basophil % : 1.1 %    2020 16:24    132    |  100    |  12     ----------------------------<  128    3.8     |  24     |  1.20     Ca    8.0        2020 16:24  Phos  2.5       2020 06:10  Mg     1.9       2020 16:24    TPro  6.5    /  Alb  1.6    /  TBili  22.6   /  DBili  x      /  AST  147    /  ALT  52     /  AlkPhos  96     2020 06:10    PT/INR - ( 2020 06:10 )   PT: 44.5 sec;   INR: 4.07 ratio         PTT - ( 2020 06:10 )  PTT:50.4 sec  Urinalysis Basic - ( 10 Jul 2020 21:45 )    Color: Claudine / Appearance: Slightly Turbid / S.010 / pH: x  Gluc: x / Ketone: Trace  / Bili: Large / Urobili: 12   Blood: x / Protein: 30 mg/dL / Nitrite: Negative   Leuk Esterase: Negative / RBC: 0-2 /HPF / WBC 0-2   Sq Epi: x / Non Sq Epi: Occasional / Bacteria: Occasional      CAPILLARY BLOOD GLUCOSE              RADIOLOGY & ADDITIONAL TESTS:    Personally reviewed.     Consultant(s) Notes Reviewed:  [x] YES  [ ] NO

## 2020-07-11 NOTE — PROGRESS NOTE ADULT - ATTENDING COMMENTS
Note written by attending, see above.  Time spent: 70min. More than 50% of the visit was spent counseling the patient / pt's sister on his condition - decompensated liver failure, acute alcoholic hepatitis, acute alcohol withdrawal with delirium, acute hepatic encephalopathy. Note written by attending, see above.  Time spent: 70min of critical care spent on the patient. Pt's treatment course and prognosis in detail and cared for the pt while critically ill with DTs. Discussed, liver failure, MELD, Maddrey's DF, prednisolone.

## 2020-07-11 NOTE — PROGRESS NOTE ADULT - PROBLEM SELECTOR PLAN 8
-chronic, on home medications as above  -continue home meds with hold parameters  -monitor serial BP and tx symptomatic hypertension if pt withdraws -c/w diuretics  -monitor BP

## 2020-07-11 NOTE — CONSULT NOTE ADULT - PROBLEM SELECTOR RECOMMENDATION 9
etoh use disorder - smoker - drinker - Vodka - alert - and oriented -   known cirrhosis and etoh hepatitis -   on steroids for etoh hepatitis  ascites on exam - hx of paracentesis on prior admissions - GI eval pending  GERARDO - ativan prn - ciwa scale - Librium ATC - with hold parameters  monitor VS and HD and Sat  offered NRT - refused - smoking cess ed and counseling  vitamins  nutrition  MELD score and DF calculated  prognosis POOR  follows with Liver Specialist in [place]

## 2020-07-12 ENCOUNTER — RESULT REVIEW (OUTPATIENT)
Age: 50
End: 2020-07-12

## 2020-07-12 LAB
24R-OH-CALCIDIOL SERPL-MCNC: 16 NG/ML — LOW (ref 30–80)
ALBUMIN SERPL ELPH-MCNC: 2.2 G/DL — LOW (ref 3.3–5)
ALP SERPL-CCNC: 107 U/L — SIGNIFICANT CHANGE UP (ref 40–120)
ALT FLD-CCNC: 49 U/L — SIGNIFICANT CHANGE UP (ref 12–78)
AMMONIA BLD-MCNC: 78 UMOL/L — HIGH (ref 11–32)
ANION GAP SERPL CALC-SCNC: 9 MMOL/L — SIGNIFICANT CHANGE UP (ref 5–17)
APPEARANCE UR: ABNORMAL
APTT BLD: 50.2 SEC — HIGH (ref 27.5–35.5)
AST SERPL-CCNC: 120 U/L — HIGH (ref 15–37)
BACTERIA # UR AUTO: ABNORMAL
BASOPHILS # BLD AUTO: 0.01 K/UL — SIGNIFICANT CHANGE UP (ref 0–0.2)
BASOPHILS NFR BLD AUTO: 0.1 % — SIGNIFICANT CHANGE UP (ref 0–2)
BILIRUB SERPL-MCNC: 25.4 MG/DL — CRITICAL HIGH (ref 0.2–1.2)
BILIRUB UR-MCNC: ABNORMAL
BUN SERPL-MCNC: 14 MG/DL — SIGNIFICANT CHANGE UP (ref 7–23)
CALCIUM SERPL-MCNC: 8.5 MG/DL — SIGNIFICANT CHANGE UP (ref 8.5–10.1)
CHLORIDE SERPL-SCNC: 99 MMOL/L — SIGNIFICANT CHANGE UP (ref 96–108)
CO2 SERPL-SCNC: 26 MMOL/L — SIGNIFICANT CHANGE UP (ref 22–31)
COLOR SPEC: ABNORMAL
COMMENT - URINE: SIGNIFICANT CHANGE UP
CREAT SERPL-MCNC: 1.1 MG/DL — SIGNIFICANT CHANGE UP (ref 0.5–1.3)
DIFF PNL FLD: ABNORMAL
EOSINOPHIL # BLD AUTO: 0.03 K/UL — SIGNIFICANT CHANGE UP (ref 0–0.5)
EOSINOPHIL NFR BLD AUTO: 0.4 % — SIGNIFICANT CHANGE UP (ref 0–6)
EPI CELLS # UR: SIGNIFICANT CHANGE UP
GLUCOSE SERPL-MCNC: 112 MG/DL — HIGH (ref 70–99)
GLUCOSE UR QL: NEGATIVE — SIGNIFICANT CHANGE UP
HCT VFR BLD CALC: 24.5 % — LOW (ref 39–50)
HGB BLD-MCNC: 8 G/DL — LOW (ref 13–17)
IMM GRANULOCYTES NFR BLD AUTO: 1.9 % — HIGH (ref 0–1.5)
INR BLD: 3.39 RATIO — HIGH (ref 0.88–1.16)
KETONES UR-MCNC: NEGATIVE — SIGNIFICANT CHANGE UP
LEUKOCYTE ESTERASE UR-ACNC: ABNORMAL
LYMPHOCYTES # BLD AUTO: 0.83 K/UL — LOW (ref 1–3.3)
LYMPHOCYTES # BLD AUTO: 10 % — LOW (ref 13–44)
MAGNESIUM SERPL-MCNC: 2.5 MG/DL — SIGNIFICANT CHANGE UP (ref 1.6–2.6)
MAGNESIUM SERPL-MCNC: 2.5 MG/DL — SIGNIFICANT CHANGE UP (ref 1.6–2.6)
MCHC RBC-ENTMCNC: 32.7 GM/DL — SIGNIFICANT CHANGE UP (ref 32–36)
MCHC RBC-ENTMCNC: 40.2 PG — HIGH (ref 27–34)
MCV RBC AUTO: 123.1 FL — HIGH (ref 80–100)
MONOCYTES # BLD AUTO: 0.8 K/UL — SIGNIFICANT CHANGE UP (ref 0–0.9)
MONOCYTES NFR BLD AUTO: 9.6 % — SIGNIFICANT CHANGE UP (ref 2–14)
NEUTROPHILS # BLD AUTO: 6.5 K/UL — SIGNIFICANT CHANGE UP (ref 1.8–7.4)
NEUTROPHILS NFR BLD AUTO: 78 % — HIGH (ref 43–77)
NITRITE UR-MCNC: POSITIVE
NRBC # BLD: 0 /100 WBCS — SIGNIFICANT CHANGE UP (ref 0–0)
PH UR: 7 — SIGNIFICANT CHANGE UP (ref 5–8)
PHOSPHATE SERPL-MCNC: 3 MG/DL — SIGNIFICANT CHANGE UP (ref 2.5–4.5)
PLATELET # BLD AUTO: 55 K/UL — LOW (ref 150–400)
POTASSIUM SERPL-MCNC: 3.8 MMOL/L — SIGNIFICANT CHANGE UP (ref 3.5–5.3)
POTASSIUM SERPL-SCNC: 3.8 MMOL/L — SIGNIFICANT CHANGE UP (ref 3.5–5.3)
PROT SERPL-MCNC: 6.9 G/DL — SIGNIFICANT CHANGE UP (ref 6–8.3)
PROT UR-MCNC: NEGATIVE — SIGNIFICANT CHANGE UP
PROTHROM AB SERPL-ACNC: 37.4 SEC — HIGH (ref 10.6–13.6)
RBC # BLD: 1.99 M/UL — LOW (ref 4.2–5.8)
RBC # FLD: 18.2 % — HIGH (ref 10.3–14.5)
RBC CASTS # UR COMP ASSIST: SIGNIFICANT CHANGE UP /HPF (ref 0–4)
SODIUM SERPL-SCNC: 134 MMOL/L — LOW (ref 135–145)
SP GR SPEC: 1.01 — SIGNIFICANT CHANGE UP (ref 1.01–1.02)
UROBILINOGEN FLD QL: 12
WBC # BLD: 8.33 K/UL — SIGNIFICANT CHANGE UP (ref 3.8–10.5)
WBC # FLD AUTO: 8.33 K/UL — SIGNIFICANT CHANGE UP (ref 3.8–10.5)
WBC UR QL: SIGNIFICANT CHANGE UP

## 2020-07-12 PROCEDURE — 99233 SBSQ HOSP IP/OBS HIGH 50: CPT | Mod: GC,25

## 2020-07-12 PROCEDURE — 88305 TISSUE EXAM BY PATHOLOGIST: CPT | Mod: 26

## 2020-07-12 PROCEDURE — 88108 CYTOPATH CONCENTRATE TECH: CPT | Mod: 26

## 2020-07-12 PROCEDURE — 49082 ABD PARACENTESIS: CPT

## 2020-07-12 PROCEDURE — 99233 SBSQ HOSP IP/OBS HIGH 50: CPT

## 2020-07-12 RX ORDER — LACTULOSE 10 G/15ML
20 SOLUTION ORAL
Refills: 0 | Status: DISCONTINUED | OUTPATIENT
Start: 2020-07-12 | End: 2020-07-17

## 2020-07-12 RX ORDER — AZTREONAM 2 G
1000 VIAL (EA) INJECTION ONCE
Refills: 0 | Status: COMPLETED | OUTPATIENT
Start: 2020-07-12 | End: 2020-07-13

## 2020-07-12 RX ORDER — LACTULOSE 10 G/15ML
10 SOLUTION ORAL
Refills: 0 | Status: DISCONTINUED | OUTPATIENT
Start: 2020-07-12 | End: 2020-07-12

## 2020-07-12 RX ORDER — AZTREONAM 2 G
VIAL (EA) INJECTION
Refills: 0 | Status: DISCONTINUED | OUTPATIENT
Start: 2020-07-13 | End: 2020-07-14

## 2020-07-12 RX ORDER — AZTREONAM 2 G
1000 VIAL (EA) INJECTION EVERY 8 HOURS
Refills: 0 | Status: DISCONTINUED | OUTPATIENT
Start: 2020-07-13 | End: 2020-07-14

## 2020-07-12 RX ORDER — LIDOCAINE HCL 20 MG/ML
10 VIAL (ML) INJECTION ONCE
Refills: 0 | Status: DISCONTINUED | OUTPATIENT
Start: 2020-07-12 | End: 2020-07-12

## 2020-07-12 RX ORDER — LACTULOSE 10 G/15ML
10 SOLUTION ORAL THREE TIMES A DAY
Refills: 0 | Status: DISCONTINUED | OUTPATIENT
Start: 2020-07-12 | End: 2020-07-12

## 2020-07-12 RX ORDER — PHYTONADIONE (VIT K1) 5 MG
5 TABLET ORAL DAILY
Refills: 0 | Status: COMPLETED | OUTPATIENT
Start: 2020-07-12 | End: 2020-07-14

## 2020-07-12 RX ADMIN — LACTULOSE 10 GRAM(S): 10 SOLUTION ORAL at 17:20

## 2020-07-12 RX ADMIN — LACTULOSE 10 GRAM(S): 10 SOLUTION ORAL at 12:47

## 2020-07-12 RX ADMIN — LACTULOSE 20 GRAM(S): 10 SOLUTION ORAL at 22:58

## 2020-07-12 RX ADMIN — Medication 5 MILLIGRAM(S): at 14:48

## 2020-07-12 NOTE — PROCEDURE NOTE - ULTRASOUND ASSESSMENT OF FLUID/LOCATION
large amount of ascites identified in RLQ, small gauge needle used to aspirate about 30 cc of dark yellow fluid

## 2020-07-12 NOTE — DIETITIAN INITIAL EVALUATION ADULT. - PROBLEM SELECTOR PLAN 2
-chronic ETOH abuse since for >20 years, denies h/o seizures from withdrawal  -drinks 1 quart vodka daily  -h/o cirrhosis on home medications  -h/o bleeding esophageal varices with banding in 2017  -type and screen ordered  -monitor for HD stability  -CIWA protocol  -will keep on Librium for now due to ADR to ativan, monitor worsening Liver fx

## 2020-07-12 NOTE — PROGRESS NOTE ADULT - PROBLEM SELECTOR PLAN 6
-pt has h/o of paracentesis ~1 year ago with 2 L removed, 2/2 cirrhosis from chronic ETOH abuse  -no current abdominal discomfort or SOB or fever making SBP less likely  -had diagnostic paracentesis this evening -- f/up ascites studies   -monitor for increase in distention, fevers, and abdominal pain

## 2020-07-12 NOTE — PROGRESS NOTE ADULT - PROBLEM SELECTOR PLAN 4
-Hgb at 8 today (as it was on admission) -- (previously 13.2 in 2019),   -pt denies bleeding, hemoptysis, hematemesis, melena, hematuria  -FOBT ordered  -anemia panel --B12 and folate are normal. Ferritin >700. -- likely does not require any supplementation for anemia  -vitamin K PO ordered  -trend CBC, PT/INR  -transfuse if Hgb <7

## 2020-07-12 NOTE — PROGRESS NOTE ADULT - PROBLEM SELECTOR PLAN 1
-acute on chronic liver failure   -pt with h/o cirrhosis, esophageal varices s/p banding in 2017, hx of ascites s/p therapeutic paracentesis ~1 year ago, 2/2 chronic alcoholism  -h/o cirrhosis on home medications, spironolactone, furosemide, nadolol -- held for now as pt was hypotensive   -Maddrey's DF: 172; MELD score of 34 based on yesterday's labs -- mild improvement today  -initially started prednisolone 40mg po daily for acute alcoholic hepatitis, but pt's hepatologist, Dr. Drew, recommended this be held for now  -ammonia level which was elevated to 142 -- coming down but still elevated at 78 -- c/w rifaximin and lactulose (dose increased)  -vitamin K PO ordered  -monitor PT/INR, LFTs, ammonia, CBC  -[facility] protocol  -GI consulted (Jaye), recs appreciated

## 2020-07-12 NOTE — PROGRESS NOTE ADULT - PROBLEM SELECTOR PLAN 1
end stage cirrhosis - hepatitis - drinker - smoker - ascites -   on steroids  GI following  labs reviewed  GERARDO - Ativan PRN - ciwa - unable to tolerate Librium -   on RA - however - has pulm congestion - likely multifactorial   may need Paracentesis on this admission - GI following  supportive ICU measures  prognosis poor - high MELD and DF scores

## 2020-07-12 NOTE — PROGRESS NOTE ADULT - SUBJECTIVE AND OBJECTIVE BOX
INTERVAL HPI/OVERNIGHT EVENTS:  Pt seen and examined, sleepy arousable w/ increased stimuli  Confused to Time, place, situation, unable to recite serial 3's past 15  + asterixis  elevated ammonia level yest 147  MELD-Na 34       MEDICATIONS  (STANDING):  ergocalciferol 44134 Unit(s) Oral every week  famotidine    Tablet 20 milliGRAM(s) Oral two times a day  folic acid 1 milliGRAM(s) Oral daily  lactulose Syrup 10 Gram(s) Oral three times a day  magnesium oxide 400 milliGRAM(s) Oral daily  multivitamin 1 Tablet(s) Oral daily  phytonadione   Solution 5 milliGRAM(s) Oral daily  prednisoLONE    3 mG/mL Solution (ORAPRED) 40 milliGRAM(s) Oral daily  rifAXIMin 550 milliGRAM(s) Oral two times a day  thiamine 100 milliGRAM(s) Oral daily    MEDICATIONS  (PRN):  LORazepam   Injectable 2 milliGRAM(s) IV Push every 2 hours PRN Symptom-triggered: 2 point increase in CIWA -Ar score and a total score of 7 or LESS  LORazepam   Injectable 2 milliGRAM(s) IV Push every 1 hour PRN Symptom-triggered: each CIWA -Ar score 8 or GREATER  ondansetron Injectable 4 milliGRAM(s) IV Push every 6 hours PRN Nausea      Allergies    penicillin (Unknown)    Intolerances      ROS:   unable to obtain     PHYSICAL EXAM:   Vital Signs:  Vital Signs Last 24 Hrs  T(C): 36.6 (2020 08:00), Max: 37.1 (2020 18:35)  T(F): 97.9 (2020 08:00), Max: 98.7 (2020 18:35)  HR: 59 (2020 11:00) (54 - 83)  BP: 95/56 (2020 11:00) (73/35 - 126/96)  BP(mean): 69 (2020 11:00) (51 - 101)  RR: 17 (2020 11:00) (9 - 25)  SpO2: 96% (2020 11:00) (90% - 98%)  Daily     Daily Weight in k.9 (2020 08:49)      GENERAL:  Jaundiced Appears stated age, well groomed  HEENT:  sclera -icteric NC/AT, no thyromegaly, nodules, adenopathy, no JVD  CHEST:  Full & symmetric excursion, no increased effort, breath sounds clear  HEART:  Regular rhythm, S1, S2, no murmur/rub/S3/S4, no abdominal bruit, no edema  ABDOMEN:  Soft, non-tender, non-distended, normoactive bowel sounds,  no masses , no hepato-splenomegaly  EXTREMITIES  Pos LE pitting +1 edema no cyanosis, clubbing   SKIN:  No rash/erythema/ecchymoses/petechiae/wounds/abscess/warm/dry  NEURO:  pos asterixis, pos encephalopathy, confused to place,  time,  situation       LABS:                        8.0    8.33  )-----------( 55       ( 2020 08:33 )             24.5     07-12    134<L>  |  99  |  14  ----------------------------<  112<H>  3.8   |  26  |  1.10    Ca    8.5      2020 08:33  Phos  3.0     07-12  Mg     2.5     07-12    TPro  6.9  /  Alb  2.2<L>  /  TBili  25.4<HH>  /  DBili  x   /  AST  120<H>  /  ALT  49  /  AlkPhos  107  07-12    PT/INR - ( 2020 08:33 )   PT: 37.4 sec;   INR: 3.39 ratio         PTT - ( 2020 08:33 )  PTT:50.2 sec  Urinalysis Basic - ( 10 Jul 2020 21:45 )    Color: Claudine / Appearance: Slightly Turbid / S.010 / pH: x  Gluc: x / Ketone: Trace  / Bili: Large / Urobili: 12   Blood: x / Protein: 30 mg/dL / Nitrite: Negative   Leuk Esterase: Negative / RBC: 0-2 /HPF / WBC 0-2   Sq Epi: x / Non Sq Epi: Occasional / Bacteria: Occasional        RADIOLOGY & ADDITIONAL TESTS:

## 2020-07-12 NOTE — PROGRESS NOTE ADULT - PROBLEM SELECTOR PLAN 7
-s/p bleeding esophageal varices banding in 2017  -on nadolol at home, will continue inpatient with hold parameters as long as BP is stable (was hypotensive overnight)  -type and screen ordered  -PT/INR elevated, trend platelets  -vitamin K ordered  -pt currently stable and comfortable with no active nausea, vomiting or other sign of GIB  -monitor for new onset hemoptysis or HD instability

## 2020-07-12 NOTE — PROGRESS NOTE ADULT - SUBJECTIVE AND OBJECTIVE BOX
Patient is a 50y old  Male who presents with a chief complaint of alcohol withdrawal, jaundice.     INTERVAL HPI/OVERNIGHT EVENTS: Pt was hypotensive overnight and given albumin. Pt's mentation is improving and he was able to answer simple questions with organized answers. Denies fever, chills, abd pain, SOB, CP, palpitations, nausea, vomiting, melena, hematochezia. Admits to mild tremor in his hands.    MEDICATIONS  (STANDING):  ergocalciferol 58185 Unit(s) Oral every week  famotidine    Tablet 20 milliGRAM(s) Oral two times a day  folic acid 1 milliGRAM(s) Oral daily  lactulose Syrup 20 Gram(s) Oral four times a day  magnesium oxide 400 milliGRAM(s) Oral daily  multivitamin 1 Tablet(s) Oral daily  phytonadione   Solution 5 milliGRAM(s) Oral daily  rifAXIMin 550 milliGRAM(s) Oral two times a day  thiamine 100 milliGRAM(s) Oral daily    MEDICATIONS  (PRN):  LORazepam   Injectable 2 milliGRAM(s) IV Push every 2 hours PRN Symptom-triggered: 2 point increase in CIWA -Ar score and a total score of 7 or LESS  LORazepam   Injectable 2 milliGRAM(s) IV Push every 1 hour PRN Symptom-triggered: each CIWA -Ar score 8 or GREATER  ondansetron Injectable 4 milliGRAM(s) IV Push every 6 hours PRN Nausea      Allergies    penicillin (Unknown)    Intolerances        REVIEW OF SYSTEMS:  CONSTITUTIONAL: No fever or chills  HEENT:  No headache, no sore throat  RESPIRATORY: No cough, wheezing, or shortness of breath  CARDIOVASCULAR: No chest pain, palpitations  GASTROINTESTINAL: admits abdominal distension; No abd pain, nausea, vomiting, or diarrhea  GENITOURINARY: No dysuria, frequency, or hematuria  NEUROLOGICAL: no focal weakness or dizziness  MUSCULOSKELETAL: no myalgias   PSYCH: hand tremor    Vital Signs Last 24 Hrs  T(C): 36.6 (2020 19:53), Max: 37 (2020 15:57)  T(F): 97.8 (2020 19:53), Max: 98.6 (2020 15:57)  HR: 75 (2020 20:00) (54 - 83)  BP: 107/59 (2020 20:00) (73/35 - 126/96)  BP(mean): 75 (2020 20:00) (51 - 101)  RR: 16 (2020 20:00) (9 - 26)  SpO2: 97% (2020 20:00) (92% - 98%)    PHYSICAL EXAM:  GENERAL: NAD, ++jaundiced  HEENT:  +icteric, moist mucous membranes  CHEST/LUNG:  CTA b/l, no rales, wheezes, or rhonchi  HEART:  RRR, S1, S2  ABDOMEN:  BS+, soft, nontender, +distended; +ascites  EXTREMITIES: no cyanosis or calf tenderness  NERVOUS SYSTEM: AA&Ox2 (states date is 2020)   PSYCH: +hand tremors    LABS:                        8.0    8.33  )-----------( 55       ( 2020 08:33 )             24.5     CBC Full  -  ( 2020 08:33 )  WBC Count : 8.33 K/uL  Hemoglobin : 8.0 g/dL  Hematocrit : 24.5 %  Platelet Count - Automated : 55 K/uL  Mean Cell Volume : 123.1 fl  Mean Cell Hemoglobin : 40.2 pg  Mean Cell Hemoglobin Concentration : 32.7 gm/dL  Auto Neutrophil # : 6.50 K/uL  Auto Lymphocyte # : 0.83 K/uL  Auto Monocyte # : 0.80 K/uL  Auto Eosinophil # : 0.03 K/uL  Auto Basophil # : 0.01 K/uL  Auto Neutrophil % : 78.0 %  Auto Lymphocyte % : 10.0 %  Auto Monocyte % : 9.6 %  Auto Eosinophil % : 0.4 %  Auto Basophil % : 0.1 %    2020 08:33    134    |  99     |  14     ----------------------------<  112    3.8     |  26     |  1.10     Ca    8.5        2020 08:33  Phos  3.0       2020 08:33  Mg     2.5       2020 08:33    TPro  6.9    /  Alb  2.2    /  TBili  25.4   /  DBili  x      /  AST  120    /  ALT  49     /  AlkPhos  107    2020 08:33    PT/INR - ( 2020 08:33 )   PT: 37.4 sec;   INR: 3.39 ratio         PTT - ( 2020 08:33 )  PTT:50.2 sec  Urinalysis Basic - ( 2020 17:16 )    Color: Claudine / Appearance: Slightly Turbid / S.010 / pH: x  Gluc: x / Ketone: Negative  / Bili: Large / Urobili: 12   Blood: x / Protein: Negative / Nitrite: Positive   Leuk Esterase: Trace / RBC: 0-2 /HPF / WBC 3-5   Sq Epi: x / Non Sq Epi: Occasional / Bacteria: Few      CAPILLARY BLOOD GLUCOSE              RADIOLOGY & ADDITIONAL TESTS:    Personally reviewed.     Consultant(s) Notes Reviewed:  [x] YES  [ ] NO

## 2020-07-12 NOTE — PROGRESS NOTE ADULT - SUBJECTIVE AND OBJECTIVE BOX
INCOMPLETE    Patient is a 50y old  Male who presents with a chief complaint of alcohol withdrawl, elevated bilirubin (2020 07:26)    50 year old man with Hx of EtOH cirrhosis, complicated by ascites, EV presenting with EtOH withdrawal and EtOH hepatitis with liver failure.      50y M with a PMHx of alcohol dependence, liver cirrhosis, esophageal varices s/p banding in 2017, ascites s/p paracentesis 1 year ago, IBS, who comes in for detox and jaundice x 1 month. He states he relapsed in April after successfully quitting for 6 months previously. He has been admitted for detox multiple times in the past, in  he was admitted at [facility] and 2017 at [facility]. He denies seizures in the past from withdrawal however he does endorse shaking, N, V when he doesnt drinks for 2-3 days. Pt states he drinks 1 quart of vodka daily and his last drink was yesterday around 3 PM. He follows with GI Dr. Drew at [facility] regularly for his cirrhosis. He noticed he was getting jaundiced about 1 month ago. He states the last time this happened his T bili was also elevated just like today. He also endorses falling 1 week ago onto his right hip. He has been able to walk on it but has pain when standing for a long time. Denies shooting pain into his leg, numbness tingling. He endorses chills, difficulty with balance, N, V of clear liquid when he doesn't drink for 2-3 days. He denies current fevers, chills, neck pain, visual changes, CP, SOB, cough, palpitations, abdominal pain, diarrhea, dysuria. He endorses chronic balance difficulties, abdominal distention without pain, yellowing of his skin and eyes,   darkening of his urine, right hip pain.      24 hour events: CIWA 13-16 in past 24 hrs, on librium. had PRN ativan 2mg x1 overnight.    REVIEW OF SYSTEMS  Constitutional: No fever, chills, fatigue  Neuro: No headache, numbness, weakness  Resp: No cough, wheezing, shortness of breath  CVS: No chest pain, palpitations, leg swelling  GI: No abdominal pain, nausea, vomiting, diarrhea   : No dysuria, frequency, incontinence  Skin: No itching, burning, rashes, or lesions   Msk: No joint pain or swelling  Psych: No depression, anxiety, mood swings  Heme: No bleeding    T(F): 98.2 (20 @ 04:27), Max: 98.7 (20 @ 18:35)  HR: 68 (20 @ 04:00) (56 - 74)  BP: 91/62 (20 @ 04:00) (73/35 - 126/96)  RR: 17 (20 @ 04:00) (10 - 25)  SpO2: 98% (20 @ 04:00) (90% - 98%)  Wt(kg): --            I&O's Summary     @ 07:01  -   @ 07:00  --------------------------------------------------------  IN: 50 mL / OUT: 500 mL / NET: -450 mL      PHYSICAL EXAM  General:   CNS:   HEENT:   Resp:   CVS:   Abd:   Ext:   Skin:     MEDICATIONS  rifAXIMin Oral    amLODIPine   Tablet Oral  furosemide    Tablet Oral  propranolol Oral  spironolactone Oral    prednisoLONE    3 mG/mL Solution (ORAPRED) Oral      LORazepam   Injectable IV Push PRN  LORazepam   Injectable IV Push PRN  ondansetron Injectable IV Push PRN        famotidine    Tablet Oral      albumin human 25% IVPB IV Intermittent  ergocalciferol Oral  folic acid Oral  magnesium oxide Oral  multivitamin Oral  thiamine Oral                                7.2    6.32  )-----------( 52       ( 2020 06:10 )             21.1           132<L>  |  100  |  12  ----------------------------<  128<H>  3.8   |  24  |  1.20    Ca    8.0<L>      2020 16:24  Phos  2.5       Mg     2.5     12    TPro  6.5  /  Alb  1.6<L>  /  TBili  22.6<HH>  /  DBili  x   /  AST  147<H>  /  ALT  52  /  AlkPhos  96  07-11          PT/INR - ( 2020 06:10 )   PT: 44.5 sec;   INR: 4.07 ratio         PTT - ( 2020 06:10 )  PTT:50.4 sec  Urinalysis Basic - ( 10 Jul 2020 21:45 )    Color: Claudine / Appearance: Slightly Turbid / S.010 / pH: x  Gluc: x / Ketone: Trace  / Bili: Large / Urobili: 12   Blood: x / Protein: 30 mg/dL / Nitrite: Negative   Leuk Esterase: Negative / RBC: 0-2 /HPF / WBC 0-2   Sq Epi: x / Non Sq Epi: Occasional / Bacteria: Occasional            Radiology: ***  Bedside lung ultrasound: ***  Bedside ECHO: ***    CENTRAL LINE: Y/N          DATE INSERTED:              REMOVE: Y/N  DENNY: Y/N                        DATE INSERTED:              REMOVE: Y/N  A-LINE: Y/N                       DATE INSERTED:              REMOVE: Y/N    GLOBAL ISSUE/BEST PRACTICE  Analgesia:   Sedation:   CAM-ICU:   HOB elevation: yes  Stress ulcer prophylaxis:   VTE prophylaxis:   Glycemic control:   Nutrition:     CODE STATUS: ***  GO discussion: Y Patient is a 50y old  Male who presents with a chief complaint of alcohol withdrawl, elevated bilirubin (2020 07:26)    50 year old man with Hx of EtOH cirrhosis, complicated by ascites, EV presenting with EtOH withdrawal and EtOH hepatitis with liver failure.      50y M with a PMHx of alcohol dependence, liver cirrhosis, esophageal varices s/p banding in 2017, ascites s/p paracentesis 1 year ago, IBS, who comes in for detox and jaundice x 1 month. He states he relapsed in April after successfully quitting for 6 months previously. He has been admitted for detox multiple times in the past, in  he was admitted at [facility] and 2017 at [facility]. He denies seizures in the past from withdrawal however he does endorse shaking, N, V when he doesnt drinks for 2-3 days. Pt states he drinks 1 quart of vodka daily and his last drink was yesterday around 3 PM. He follows with GI Dr. Drew at [facility] regularly for his cirrhosis. He noticed he was getting jaundiced about 1 month ago. He states the last time this happened his T bili was also elevated just like today. He also endorses falling 1 week ago onto his right hip. He has been able to walk on it but has pain when standing for a long time. Denies shooting pain into his leg, numbness tingling. He endorses chills, difficulty with balance, N, V of clear liquid when he doesn't drink for 2-3 days. He denies current fevers, chills, neck pain, visual changes, CP, SOB, cough, palpitations, abdominal pain, diarrhea, dysuria. He endorses chronic balance difficulties, abdominal distention without pain, yellowing of his skin and eyes,   darkening of his urine, right hip pain.      24 hour events: CIWA 13-16 in past 24 hrs, had PRN ativan 2mg x1 overnight. Off librium.    REVIEW OF SYSTEMS  Constitutional: No fever or fatigue  Neuro: No headache or weakness  Resp: No cough, wheezing, or shortness of breath  CVS: No chest pain, palpitations  GI: No abdominal pain, nausea, vomiting, diarrhea   Skin: No itching, burning,   Msk: No joint pain   Heme: No bleeding    T(F): 98.2 (20 @ 04:27), Max: 98.7 (20 @ 18:35)  HR: 68 (20 @ 04:00) (56 - 74)  BP: 91/62 (20 @ 04:00) (73/35 - 126/96)  RR: 17 (20 @ 04:00) (10 - 25)  SpO2: 98% (20 @ 04:00) (90% - 98%)  Wt(kg): --            I&O's Summary     @ 07:01  -   @ 07:00  --------------------------------------------------------  IN: 50 mL / OUT: 500 mL / NET: -450 mL      PHYSICAL EXAM  General: Ill appearing,   HEENT: Scleral icteris, PEERLA,    Resp: distant breath sounds b/l, no wheezing or rhonchi  CVS: RRR, S1, S2  Abd: Abdominal cavity distended   Ext: Right LE swelling   Skin: Ecchymosis on the left LE and upper right chest       MEDICATIONS  rifAXIMin Oral    amLODIPine   Tablet Oral  furosemide    Tablet Oral  propranolol Oral  spironolactone Oral    prednisoLONE    3 mG/mL Solution (ORAPRED) Oral      LORazepam   Injectable IV Push PRN  LORazepam   Injectable IV Push PRN  ondansetron Injectable IV Push PRN        famotidine    Tablet Oral      albumin human 25% IVPB IV Intermittent  ergocalciferol Oral  folic acid Oral  magnesium oxide Oral  multivitamin Oral  thiamine Oral                                7.2    6.32  )-----------( 52       ( 2020 06:10 )             21.1       07-11    132<L>  |  100  |  12  ----------------------------<  128<H>  3.8   |  24  |  1.20    Ca    8.0<L>      2020 16:24  Phos  2.5       Mg     2.5     12    TPro  6.5  /  Alb  1.6<L>  /  TBili  22.6<HH>  /  DBili  x   /  AST  147<H>  /  ALT  52  /  AlkPhos  96  07-11          PT/INR - ( 2020 06:10 )   PT: 44.5 sec;   INR: 4.07 ratio         PTT - ( 2020 06:10 )  PTT:50.4 sec  Urinalysis Basic - ( 10 Jul 2020 21:45 )    Color: Claudine / Appearance: Slightly Turbid / S.010 / pH: x  Gluc: x / Ketone: Trace  / Bili: Large / Urobili: 12   Blood: x / Protein: 30 mg/dL / Nitrite: Negative   Leuk Esterase: Negative / RBC: 0-2 /HPF / WBC 0-2   Sq Epi: x / Non Sq Epi: Occasional / Bacteria: Occasional            Radiology:   Bedside lung ultrasound:   Bedside ECHO:     CENTRAL LINE: N          DATE INSERTED:              REMOVE: Y/N  DENNY: N                        DATE INSERTED:              REMOVE: Y/N  A-LINE: N                       DATE INSERTED:              REMOVE: Y/N    GLOBAL ISSUE/BEST PRACTICE  Analgesia: N   Sedation: N  HOB elevation: yes  Stress ulcer prophylaxis: N   VTE prophylaxis: SCDs  Glycemic control: N  Nutrition:     CODE STATUS: ***  [facility] discussion: Y Patient is a 50y old  Male who presents with a chief complaint of alcohol withdrawl, elevated bilirubin (2020 07:26)    50 year old man with Hx of EtOH cirrhosis, complicated by ascites, EV presenting with EtOH withdrawal and EtOH hepatitis with liver failure.      50y M with a PMHx of alcohol dependence, liver cirrhosis, esophageal varices s/p banding in 2017, ascites s/p paracentesis 1 year ago, IBS, who comes in for detox and jaundice x 1 month. He states he relapsed in April after successfully quitting for 6 months previously. He has been admitted for detox multiple times in the past, in  he was admitted at [facility] and 2017 at [facility]. He denies seizures in the past from withdrawal however he does endorse shaking, N, V when he doesnt drinks for 2-3 days. Pt states he drinks 1 quart of vodka daily and his last drink was yesterday around 3 PM. He follows with GI Dr. Drew at [facility] regularly for his cirrhosis. He noticed he was getting jaundiced about 1 month ago. He states the last time this happened his T bili was also elevated just like today. He also endorses falling 1 week ago onto his right hip. He has been able to walk on it but has pain when standing for a long time. Denies shooting pain into his leg, numbness tingling. He endorses chills, difficulty with balance, N, V of clear liquid when he doesn't drink for 2-3 days. He denies current fevers, chills, neck pain, visual changes, CP, SOB, cough, palpitations, abdominal pain, diarrhea, dysuria. He endorses chronic balance difficulties, abdominal distention without pain, yellowing of his skin and eyes,   darkening of his urine, right hip pain.      24 hour events: CIWA 13-16 in past 24 hrs, had PRN ativan 2mg x1 overnight. Off librium.    REVIEW OF SYSTEMS  Constitutional: No fever or fatigue  Neuro: No headache or weakness  Resp: No cough, wheezing, or shortness of breath  CVS: No chest pain, palpitations  GI: No abdominal pain, nausea, vomiting, diarrhea   Skin: No itching, burning,   Msk: No joint pain   Heme: No bleeding    T(F): 98.2 (20 @ 04:27), Max: 98.7 (20 @ 18:35)  HR: 68 (20 @ 04:00) (56 - 74)  BP: 91/62 (20 @ 04:00) (73/35 - 126/96)  RR: 17 (20 @ 04:00) (10 - 25)  SpO2: 98% (20 @ 04:00) (90% - 98%)  Wt(kg): --            I&O's Summary     @ 07:01  -   @ 07:00  --------------------------------------------------------  IN: 50 mL / OUT: 500 mL / NET: -450 mL      PHYSICAL EXAM  General: Ill appearing,   HEENT: Scleral icteris, PEERLA,    Resp: distant breath sounds b/l, no wheezing or rhonchi  CVS: RRR, S1, S2  Abd: Abdominal cavity distended   Ext: Right LE swelling   Skin: Ecchymosis on the left LE and upper right chest       MEDICATIONS  rifAXIMin Oral    amLODIPine   Tablet Oral  furosemide    Tablet Oral  propranolol Oral  spironolactone Oral    prednisoLONE    3 mG/mL Solution (ORAPRED) Oral      LORazepam   Injectable IV Push PRN  LORazepam   Injectable IV Push PRN  ondansetron Injectable IV Push PRN        famotidine    Tablet Oral      albumin human 25% IVPB IV Intermittent  ergocalciferol Oral  folic acid Oral  magnesium oxide Oral  multivitamin Oral  thiamine Oral                                7.2    6.32  )-----------( 52       ( 2020 06:10 )             21.1       07-11    132<L>  |  100  |  12  ----------------------------<  128<H>  3.8   |  24  |  1.20    Ca    8.0<L>      2020 16:24  Phos  2.5       Mg     2.5     12    TPro  6.5  /  Alb  1.6<L>  /  TBili  22.6<HH>  /  DBili  x   /  AST  147<H>  /  ALT  52  /  AlkPhos  96  07-11          PT/INR - ( 2020 06:10 )   PT: 44.5 sec;   INR: 4.07 ratio         PTT - ( 2020 06:10 )  PTT:50.4 sec  Urinalysis Basic - ( 10 Jul 2020 21:45 )    Color: Claudine / Appearance: Slightly Turbid / S.010 / pH: x  Gluc: x / Ketone: Trace  / Bili: Large / Urobili: 12   Blood: x / Protein: 30 mg/dL / Nitrite: Negative   Leuk Esterase: Negative / RBC: 0-2 /HPF / WBC 0-2   Sq Epi: x / Non Sq Epi: Occasional / Bacteria: Occasional            Radiology:   Bedside lung ultrasound:   Bedside ECHO:     CENTRAL LINE: N          DATE INSERTED:              REMOVE: Y/N  DENNY: N                        DATE INSERTED:              REMOVE: Y/N  A-LINE: N                       DATE INSERTED:              REMOVE: Y/N    GLOBAL ISSUE/BEST PRACTICE  Analgesia: N   Sedation: N  HOB elevation: yes  Stress ulcer prophylaxis: N   VTE prophylaxis: SCDs  Glycemic control: N  Nutrition: DASH diet    CODE STATUS: Full code Patient is a 50y old  Male who presents with a chief complaint of alcohol withdrawl, elevated bilirubin (2020 07:26)    50 year old man with Hx of EtOH cirrhosis, complicated by ascites, EV presenting with EtOH withdrawal and EtOH hepatitis with liver failure.      50y M with a PMHx of alcohol dependence, liver cirrhosis, esophageal varices s/p banding in 2017, ascites s/p paracentesis 1 year ago, IBS, who comes in for detox and jaundice x 1 month. He states he relapsed in April after successfully quitting for 6 months previously. He has been admitted for detox multiple times in the past, in  he was admitted at [facility] and 2017 at [facility]. He denies seizures in the past from withdrawal however he does endorse shaking, N, V when he doesnt drinks for 2-3 days. Pt states he drinks 1 quart of vodka daily and his last drink was yesterday around 3 PM. He follows with GI Dr. Drew at [facility] regularly for his cirrhosis. He noticed he was getting jaundiced about 1 month ago. He states the last time this happened his T bili was also elevated just like today. He also endorses falling 1 week ago onto his right hip. He has been able to walk on it but has pain when standing for a long time. Denies shooting pain into his leg, numbness tingling. He endorses chills, difficulty with balance, N, V of clear liquid when he doesn't drink for 2-3 days. He denies current fevers, chills, neck pain, visual changes, CP, SOB, cough, palpitations, abdominal pain, diarrhea, dysuria. He endorses chronic balance difficulties, abdominal distention without pain, yellowing of his skin and eyes,   darkening of his urine, right hip pain.      24 hour events: CIWA 13-16 in past 24 hrs, had PRN ativan 2mg x1 overnight. Off librium.    REVIEW OF SYSTEMS  Constitutional: No fever or fatigue  Neuro: No headache or weakness  Resp: No cough, wheezing, or shortness of breath  CVS: No chest pain, palpitations  GI: No abdominal pain, nausea, vomiting, diarrhea   Skin: No itching, burning,   Msk: No joint pain   Heme: No bleeding    T(F): 98.2 (20 @ 04:27), Max: 98.7 (20 @ 18:35)  HR: 68 (20 @ 04:00) (56 - 74)  BP: 91/62 (20 @ 04:00) (73/35 - 126/96)  RR: 17 (20 @ 04:00) (10 - 25)  SpO2: 98% (20 @ 04:00) (90% - 98%)  Wt(kg): --            I&O's Summary     @ 07:01  -   @ 07:00  --------------------------------------------------------  IN: 50 mL / OUT: 500 mL / NET: -450 mL      PHYSICAL EXAM  General: Ill appearing,  Neuro: asterixis   HEENT: Scleral icteris, PEERLA,    Resp: distant breath sounds b/l, no wheezing or rhonchi  CVS: RRR, S1, S2  Abd: Abdominal cavity distended   Ext: Right LE swelling   Skin: severe jaundice, Ecchymosis on the left LE and upper right chest       MEDICATIONS  rifAXIMin Oral    amLODIPine   Tablet Oral  furosemide    Tablet Oral  propranolol Oral  spironolactone Oral    prednisoLONE    3 mG/mL Solution (ORAPRED) Oral      LORazepam   Injectable IV Push PRN  LORazepam   Injectable IV Push PRN  ondansetron Injectable IV Push PRN        famotidine    Tablet Oral      albumin human 25% IVPB IV Intermittent  ergocalciferol Oral  folic acid Oral  magnesium oxide Oral  multivitamin Oral  thiamine Oral                                7.2    6.32  )-----------( 52       ( 2020 06:10 )             21.1       07-11    132<L>  |  100  |  12  ----------------------------<  128<H>  3.8   |  24  |  1.20    Ca    8.0<L>      2020 16:24  Phos  2.5     07-11  Mg     2.5     07-12    TPro  6.5  /  Alb  1.6<L>  /  TBili  22.6<HH>  /  DBili  x   /  AST  147<H>  /  ALT  52  /  AlkPhos  96  07-11          PT/INR - ( 2020 06:10 )   PT: 44.5 sec;   INR: 4.07 ratio         PTT - ( 2020 06:10 )  PTT:50.4 sec  Urinalysis Basic - ( 10 Jul 2020 21:45 )    Color: Claudine / Appearance: Slightly Turbid / S.010 / pH: x  Gluc: x / Ketone: Trace  / Bili: Large / Urobili: 12   Blood: x / Protein: 30 mg/dL / Nitrite: Negative   Leuk Esterase: Negative / RBC: 0-2 /HPF / WBC 0-2   Sq Epi: x / Non Sq Epi: Occasional / Bacteria: Occasional            Radiology:   < from: US Abdomen Complete (20 @ 19:25) >    FINDINGS:    Liver: Nodular contour suggestive of cirrhosis. Heterogeneous echotexture.  Bile ducts: Normal caliber. Common bile duct measures 4 mm.   Gallbladder: Distended with sludge and gallstones. No wall thickening, pericholecystic inflammatory change or sonographic Gallardo's sign.  Pancreas: Not visualized.  Spleen: Enlarged measuring 15 cm.   Right kidney: 11.3 cm. No hydronephrosis.    Left kidney: 10.4 cm.  No hydronephrosis.   Ascites: Moderate amount of right upper quadrant free fluid.  Aorta and IVC: Visualized portions are within normal limits.    IMPRESSION:     1. Cirrhotic liver.  2. Cholelithiasis without evidence of cholecystitis.  3. Splenomegaly.  4. Moderate amount of right upper quadrant ascites.    < end of copied text >      CENTRAL LINE: N          DATE INSERTED:              REMOVE: Y/N  DENNY: N                        DATE INSERTED:              REMOVE: Y/N  A-LINE: N                       DATE INSERTED:              REMOVE: Y/N    GLOBAL ISSUE/BEST PRACTICE  Analgesia: N   Sedation: N  HOB elevation: yes  Stress ulcer prophylaxis: N   VTE prophylaxis: SCDs  Glycemic control: N  Nutrition: DASH diet    CODE STATUS: Full code

## 2020-07-12 NOTE — PROGRESS NOTE ADULT - ASSESSMENT
Assessment and Recommendation:    Problem/Recommendation - 1:  Problem: Alcoholic hepatitis with ascites. Recommendation: GREGORIAWA protocol  Monitor elytes   start prednisolone pt with high Maddrey and MELD  SBP prophylaxis  Monitor Coags  Vit K   will likely need transfer to [facility]   monitor renal function.  Please include MELD labs w/ blood draws: LFTs and Coags  Trend ammonia  Lactulose and Xifaxan for HE   minimize sedating medications  avoid hepatotoxic meds       Problem/Recommendation - 2:  ·  Problem: Cirrhosis of liver.  Recommendation: secondary to ETOH.      Problem/Recommendation - 3:  ·  Problem: Esophageal varices with bleeding.  Recommendation: continue BB.      Problem/Recommendation - 4:  ·  Problem: ETOH abuse.  Recommendation: MONTY.      Problem/Recommendation - 5:  ·  Problem: Liver failure, acute.  Recommendation: decompensated cirrhosis  low threshold to transfer to [facility] Assessment and Recommendation:    Problem/Recommendation - 1:  Problem: Alcoholic hepatitis with ascites. Recommendation: MONTY protocol  Monitor elytes   start prednisolone pt with high Maddrey and MELD  SBP prophylaxis  Monitor Coags  Vit K   will likely need transfer to [facility]   monitor renal function.  Please include MELD labs w/ blood draws: LFTs and Coags  Trend ammonia  Lactulose and Xifaxan for HE   minimize sedating medications  avoid hepatotoxic meds  Spoke w/ Pt Hepatologist Dr. Drew recs pinto culture concern for high risk of infection, start ppx abx, increase lactulose to 30 gm Q4 Hr, can hold steroids at this time       Problem/Recommendation - 2:  ·  Problem: Cirrhosis of liver.  Recommendation: secondary to ETOH.      Problem/Recommendation - 3:  ·  Problem: Esophageal varices with bleeding.  Recommendation: continue BB.      Problem/Recommendation - 4:  ·  Problem: ETOH abuse.  Recommendation: MONTY.      Problem/Recommendation - 5:  ·  Problem: Liver failure, acute.  Recommendation: decompensated cirrhosis  low threshold to transfer to [facility]

## 2020-07-12 NOTE — DIETITIAN INITIAL EVALUATION ADULT. - OTHER INFO
Pt lethargic at time of visit. Dx acute on chronic hepatic failure. Hx cirrhosis, esophageal varices s/p banding in 2017, 2/2 chronic alcoholism. Per records consumes 1 quart vodka daily. Elevated NH3 142 s/p lactulose enema 7/11. BM 7/11, abdominal distention. Noted generlized 2+edema, karo feet/ankles 3+edema, right hand/wrist 3+edema. Dash/TLC diet rx. Unknown appetite/po intake pta, though given hx ETOH abuse likely poor nutritional status. Unable to complete nutrition focused physical exam at present. No obvious signs muscle/fat wasting. Will fully assess when medically feasible. Pt lethargic at time of visit. Dx acute on chronic hepatic failure. Hx cirrhosis, esophageal varices s/p banding in 2017, 2/2 chronic alcoholism. Per records consumes 1 quart vodka daily. Elevated NH3 142 s/p lactulose enema 7/11. Multiple loose BMs 7/11, 7/12. +Abdominal distention. Noted generalized 2+edema, karo feet/ankles 3+edema, right hand/wrist 3+edema. Dash/TLC diet rx. Unknown appetite/po intake pta, though given hx ETOH abuse likely poor nutritional status. Per RN pt consumed yogurt & orange juice for breakfast today 7/12 with full assistance. No report difficulty chewing/swallowing. Unable to complete nutrition focused physical exam at present. No obvious signs muscle/fat wasting. Will fully assess when medically feasible.

## 2020-07-12 NOTE — PROGRESS NOTE ADULT - PROBLEM SELECTOR PLAN 2
-alcohol abuse with dependence chronic ETOH abuse >20 years  -drinks 1 quart vodka daily  -rapidly deteriorated to DTs on 7/11/20 and transferred to the ICU  -addiction medicine consult (Leatha), rec appreciated  -in setting of liver failure, c/w ativan PRN for symptom-triggered CIWA  -f/up CIWA protocol with close monitoring in ICU

## 2020-07-12 NOTE — PROGRESS NOTE ADULT - ATTENDING COMMENTS
Note written by attending, see above.  Time spent: 45min. More than 50% of the visit was spent counseling the patient on his condition - alcohol withdrawal, DTs, liver failure, MELD score, ascites, paracentesis.

## 2020-07-12 NOTE — PROGRESS NOTE ADULT - ASSESSMENT
49yo M with PMH of alcohol dependence, liver cirrhosis, hx of esophageal varices s/p banding in ~2017, ascites s/p paracentesis ~1 year ago, IBS, who p/w jaundice and alcohol withdrawal a/w decompensated liver failure with acute alcoholic hepatitis, course c/b delirium tremens, acute hepatic encephalopathy.

## 2020-07-12 NOTE — PROGRESS NOTE ADULT - ASSESSMENT
- NEURO: EtOH withdrawal, came in agitated, CIWA 13-16 w PRN Ativan (x1 overnight), on librium, continue folate, multivitamin, and thaimine  - CV: Hemodynamically stable, c/w htn meds and diuretics - amlodipine, spironolactone, propranolol.  - PULM: no acute issues or underlying pulm issues   - GI: EtOH hepatitis with liver failure: continue on prednisolone   - RENAL: renal function seems acceptable, monitor electrolytes and replete as needed  - ID: no infection   - HEME: hemodynamically stable, no evidence of bleeding.   - ENDO:   - SKIN: 50 year old man with Hx of EtOH cirrhosis, complicated by ascites, EV presenting with EtOH withdrawal and EtOH hepatitis with liver failure.      NEURO: EtOH withdrawal, came in agitated, CIWA 13-16 w PRN Ativan (x1 overnight), on librium, continue folate, multivitamin, and thiamine   CV: Hypotensive, d/c htn meds and diuretics - amlodipine, spironolactone, propranolol.        - Give albumin if hypotension persists        - Right leg swelling, f/u doppler   PULM: no acute issues  GI: EtOH hepatitis with liver failure- continue on prednisolone, f/u full hepatic panel and ammonia levels, Tbilli 25.4 trending up, rifaximin and lactulose added, monitor for BMs    - RENAL: renal function seems acceptable, monitor electrolytes and replete as needed  - ID: no infection   - HEME: Daily coags, no evidence of bleeding.   - SKIN: Monitor ecchymosis on left leg and right chest.

## 2020-07-12 NOTE — PROGRESS NOTE ADULT - ATTENDING COMMENTS
50 year old man with Hx of EtOH cirrhosis, complicated by ascites, EV presenting with EtOH withdrawal and EtOH hepatitis with liver failure.      --HE, mental status improved today, awake and taking PO  increase lactulose, continue rifaximen  --EtOH withdrawal, continue symptom triggered ativan prn with CIWA monitoring  continue folate, MVI, thiamine  --hemodynamically stable  hold anti-htn meds and diuretics for hypotension  --respiratory status stable  --renal function acceptable  --EtOH hepatitis and liver failure  discussed with outpt hepatologist at [facility] Dr. Drew [phone]  for now will d/c prednisolone until infection ruled out   --check BCx, UCx, diagnostic para for SBP  empiric CTX pending results  --coagulopathy, no evidence of bleeding  vit K x 3d in case related to nutrition  continue supportive care  --check RLE dopplar for asymmetric edema  --discussed plan with sister and girlfriend  --per Dr. Drew no need for transfer to [facility] at this point.  Will continue current here and possibly transfer when more stable and leave ICU.  Family aware of this.

## 2020-07-12 NOTE — DIETITIAN INITIAL EVALUATION ADULT. - NUTRITIONGOAL OUTCOME1
Pt to consume >75% meals daily with emphasis on HBV protein sources. Pt to consume >75% meals/supplements daily with emphasis on HBV protein sources.

## 2020-07-12 NOTE — DIETITIAN INITIAL EVALUATION ADULT. - PROBLEM SELECTOR PLAN 4
-Na 129, K+ 3.4  -pt endorses chronic hyponatremia 2/2 cirrhosis  -denies current confusion, AMS, seizures  -neuro checks and seizure protocol in place  -IV fluids NS 75 cc/hr, 1 L NS bolus given in ED, will proceed judiciously with IVF, patient may need to be placed on fluid restriction and consider Na repletion if worsen Na or concerns for neurological impairment due to hypoNa  -potassium chloride 40 mEq PO x 1 ordered  -f/u AM CMP

## 2020-07-12 NOTE — PROGRESS NOTE ADULT - PROBLEM SELECTOR PLAN 5
-hyponatremia, hypokalemia, hypomagnesemia   -pt endorses chronic hyponatremia 2/2 cirrhosis  -repleted Mg and Phos  -f/u lytes  -given QT prolongation, will aim for Mg > 2.

## 2020-07-12 NOTE — DIETITIAN INITIAL EVALUATION ADULT. - SIGNS/SYMPTOMS
as evidenced by acute on chronic hepatic failure, TBili 22.6, NH3 142, consuming 1 quart vodka daily

## 2020-07-12 NOTE — DIETITIAN INITIAL EVALUATION ADULT. - PROBLEM SELECTOR PLAN 1
-pt with h/o cirrhosis, esophageal varices s/p banding in 2017, 2/2 chronic alcoholism  -multiple hospitalizations for detox and withdraw, no h/o seizures  -of note pt has reacted badly to ativan, became very violent with hallucinations, had a better experience with Librium  -h/o cirrhosis on home medications, spironolactone, furosemide, nadolol, continue inpatient with hold parameters, propranolol alternative for nadolol  -Librium 50 PO x 1 in ED, continue for symptom triggered  -famotidine 20 IV x 1 in ED, continue PO famotidine inpatient  -folic acid 1 mg x 1 PO in ED, continue  -zofran 4 IV x 1 in ED, continue PRN for nausea  -thiamine 100 IV x 1 in ED, continue  -vitamin K PO ordered  -T bili 23.5, , ALT 60, Alb 1.7  -PT/INR 42/3.8, f/u AM  -type and screen ordered  -monitor HD stability and new onset bleeding varices  -[facility] protocol  -consider GI consult if pt decompensates

## 2020-07-13 ENCOUNTER — TRANSCRIPTION ENCOUNTER (OUTPATIENT)
Age: 50
End: 2020-07-13

## 2020-07-13 LAB
ALBUMIN FLD-MCNC: 0.4 G/DL — SIGNIFICANT CHANGE UP
ALBUMIN SERPL ELPH-MCNC: 1.9 G/DL — LOW (ref 3.3–5)
ALP SERPL-CCNC: 103 U/L — SIGNIFICANT CHANGE UP (ref 40–120)
ALT FLD-CCNC: 44 U/L — SIGNIFICANT CHANGE UP (ref 12–78)
AMMONIA BLD-MCNC: 22 UMOL/L — SIGNIFICANT CHANGE UP (ref 11–32)
ANION GAP SERPL CALC-SCNC: 8 MMOL/L — SIGNIFICANT CHANGE UP (ref 5–17)
APTT BLD: 50.9 SEC — HIGH (ref 27.5–35.5)
AST SERPL-CCNC: 107 U/L — HIGH (ref 15–37)
B PERT IGG+IGM PNL SER: CLEAR — SIGNIFICANT CHANGE UP
BASOPHILS # BLD AUTO: 0.03 K/UL — SIGNIFICANT CHANGE UP (ref 0–0.2)
BASOPHILS NFR BLD AUTO: 0.3 % — SIGNIFICANT CHANGE UP (ref 0–2)
BILIRUB SERPL-MCNC: 24 MG/DL — CRITICAL HIGH (ref 0.2–1.2)
BUN SERPL-MCNC: 16 MG/DL — SIGNIFICANT CHANGE UP (ref 7–23)
CALCIUM SERPL-MCNC: 8.4 MG/DL — LOW (ref 8.5–10.1)
CHLORIDE SERPL-SCNC: 102 MMOL/L — SIGNIFICANT CHANGE UP (ref 96–108)
CO2 SERPL-SCNC: 27 MMOL/L — SIGNIFICANT CHANGE UP (ref 22–31)
COLOR FLD: YELLOW — SIGNIFICANT CHANGE UP
CREAT SERPL-MCNC: 1.2 MG/DL — SIGNIFICANT CHANGE UP (ref 0.5–1.3)
CULTURE RESULTS: SIGNIFICANT CHANGE UP
EOSINOPHIL # BLD AUTO: 0.11 K/UL — SIGNIFICANT CHANGE UP (ref 0–0.5)
EOSINOPHIL # FLD: 0 % — SIGNIFICANT CHANGE UP
EOSINOPHIL NFR BLD AUTO: 1.1 % — SIGNIFICANT CHANGE UP (ref 0–6)
FLUID INTAKE SUBSTANCE CLASS: SIGNIFICANT CHANGE UP
FLUID SEGMENTED GRANULOCYTES: 2 % — SIGNIFICANT CHANGE UP
FOLATE+VIT B12 SERBLD-IMP: 0 % — SIGNIFICANT CHANGE UP
GLUCOSE FLD-MCNC: 154 MG/DL — SIGNIFICANT CHANGE UP
GLUCOSE SERPL-MCNC: 96 MG/DL — SIGNIFICANT CHANGE UP (ref 70–99)
GRAM STN FLD: SIGNIFICANT CHANGE UP
HCT VFR BLD CALC: 24.1 % — LOW (ref 39–50)
HGB BLD-MCNC: 7.7 G/DL — LOW (ref 13–17)
IMM GRANULOCYTES NFR BLD AUTO: 3.1 % — HIGH (ref 0–1.5)
INR BLD: 3.64 RATIO — HIGH (ref 0.88–1.16)
LDH SERPL L TO P-CCNC: 72 U/L — SIGNIFICANT CHANGE UP
LYMPHOCYTES # BLD AUTO: 1.07 K/UL — SIGNIFICANT CHANGE UP (ref 1–3.3)
LYMPHOCYTES # BLD AUTO: 10.6 % — LOW (ref 13–44)
LYMPHOCYTES # FLD: 18 % — SIGNIFICANT CHANGE UP
MAGNESIUM SERPL-MCNC: 2.2 MG/DL — SIGNIFICANT CHANGE UP (ref 1.6–2.6)
MCHC RBC-ENTMCNC: 32 GM/DL — SIGNIFICANT CHANGE UP (ref 32–36)
MCHC RBC-ENTMCNC: 39.7 PG — HIGH (ref 27–34)
MCV RBC AUTO: 124.2 FL — HIGH (ref 80–100)
MESOTHL CELL # FLD: 4 % — SIGNIFICANT CHANGE UP
MONOCYTES # BLD AUTO: 1.44 K/UL — HIGH (ref 0–0.9)
MONOCYTES NFR BLD AUTO: 14.3 % — HIGH (ref 2–14)
MONOS+MACROS # FLD: 76 % — SIGNIFICANT CHANGE UP
NEUTROPHILS # BLD AUTO: 7.09 K/UL — SIGNIFICANT CHANGE UP (ref 1.8–7.4)
NEUTROPHILS NFR BLD AUTO: 70.6 % — SIGNIFICANT CHANGE UP (ref 43–77)
NRBC # BLD: 0 /100 WBCS — SIGNIFICANT CHANGE UP (ref 0–0)
NRBC # FLD: 0 — SIGNIFICANT CHANGE UP
OTHER CELLS FLD MANUAL: 0 % — SIGNIFICANT CHANGE UP
PHOSPHATE SERPL-MCNC: 2.5 MG/DL — SIGNIFICANT CHANGE UP (ref 2.5–4.5)
PLATELET # BLD AUTO: 64 K/UL — LOW (ref 150–400)
POTASSIUM SERPL-MCNC: 3.9 MMOL/L — SIGNIFICANT CHANGE UP (ref 3.5–5.3)
POTASSIUM SERPL-SCNC: 3.9 MMOL/L — SIGNIFICANT CHANGE UP (ref 3.5–5.3)
PROT FLD-MCNC: 1.1 G/DL — SIGNIFICANT CHANGE UP
PROT SERPL-MCNC: 6.3 G/DL — SIGNIFICANT CHANGE UP (ref 6–8.3)
PROTHROM AB SERPL-ACNC: 40 SEC — HIGH (ref 10.6–13.6)
RBC # BLD: 1.94 M/UL — LOW (ref 4.2–5.8)
RBC # FLD: 18.6 % — HIGH (ref 10.3–14.5)
RCV VOL RI: 1000 /UL — HIGH (ref 0–0)
SODIUM SERPL-SCNC: 137 MMOL/L — SIGNIFICANT CHANGE UP (ref 135–145)
SPECIMEN SOURCE: SIGNIFICANT CHANGE UP
SPECIMEN SOURCE: SIGNIFICANT CHANGE UP
TOTAL NUCLEATED CELL COUNT, BODY FLUID: 62 /UL — SIGNIFICANT CHANGE UP
TUBE TYPE: SIGNIFICANT CHANGE UP
WBC # BLD: 10.05 K/UL — SIGNIFICANT CHANGE UP (ref 3.8–10.5)
WBC # FLD AUTO: 10.05 K/UL — SIGNIFICANT CHANGE UP (ref 3.8–10.5)

## 2020-07-13 PROCEDURE — 93970 EXTREMITY STUDY: CPT | Mod: 26

## 2020-07-13 PROCEDURE — 99233 SBSQ HOSP IP/OBS HIGH 50: CPT

## 2020-07-13 PROCEDURE — 99233 SBSQ HOSP IP/OBS HIGH 50: CPT | Mod: GC

## 2020-07-13 RX ORDER — ENOXAPARIN SODIUM 100 MG/ML
40 INJECTION SUBCUTANEOUS DAILY
Refills: 0 | Status: DISCONTINUED | OUTPATIENT
Start: 2020-07-13 | End: 2020-07-17

## 2020-07-13 RX ORDER — PREDNISOLONE 5 MG
40 TABLET ORAL DAILY
Refills: 0 | Status: DISCONTINUED | OUTPATIENT
Start: 2020-07-13 | End: 2020-07-17

## 2020-07-13 RX ADMIN — Medication 50 MILLIGRAM(S): at 21:21

## 2020-07-13 RX ADMIN — ENOXAPARIN SODIUM 40 MILLIGRAM(S): 100 INJECTION SUBCUTANEOUS at 12:43

## 2020-07-13 RX ADMIN — Medication 5 MILLIGRAM(S): at 12:44

## 2020-07-13 RX ADMIN — Medication 40 MILLIGRAM(S): at 12:42

## 2020-07-13 RX ADMIN — LACTULOSE 20 GRAM(S): 10 SOLUTION ORAL at 05:37

## 2020-07-13 RX ADMIN — Medication 50 MILLIGRAM(S): at 14:09

## 2020-07-13 RX ADMIN — LACTULOSE 20 GRAM(S): 10 SOLUTION ORAL at 23:40

## 2020-07-13 RX ADMIN — Medication 50 MILLIGRAM(S): at 00:02

## 2020-07-13 RX ADMIN — LACTULOSE 20 GRAM(S): 10 SOLUTION ORAL at 17:38

## 2020-07-13 RX ADMIN — LACTULOSE 20 GRAM(S): 10 SOLUTION ORAL at 12:42

## 2020-07-13 RX ADMIN — Medication 50 MILLIGRAM(S): at 05:37

## 2020-07-13 NOTE — PROGRESS NOTE ADULT - PROBLEM SELECTOR PLAN 1
-acute on chronic liver failure   -pt with h/o cirrhosis, esophageal varices s/p banding in 2017, hx of ascites s/p therapeutic paracentesis ~1 year ago, 2/2 chronic alcoholism  -Maddrey's DF: 153; MELD score of 35 based on today's labs   -c/w prednisolone 40mg po daily for acute alcoholic hepatitis  -ammonia level which was elevated to 142 -- now normalized  -c/w rifaximin and lactulose (consider decreasing dose soon)  -vitamin K PO ordered  -monitor PT/INR, LFTs, ammonia, CBC  -GI consulted (Jaye), recs appreciated

## 2020-07-13 NOTE — PROGRESS NOTE ADULT - PROBLEM SELECTOR PLAN 3
-Madey's DF: 172; MELD score of 34 based on labwork on 07/11 -- has had mild improvement in DF  -c/w prednisolone 40mg po daily for acute alcoholic hepatitis  -GI consulted (Jaye), recs appreciated  -monitor PT/INR, t-bilirubin

## 2020-07-13 NOTE — PROGRESS NOTE ADULT - ATTENDING COMMENTS
50M PMH EtOH cirrhosis with portal hypertension, ascites, and hepatic encephalopathy who presents with acute alcoholic withdrawal with acute alcoholic hepatitis and liver failure. Now improved mental status.    - Continue lorazepam prn and CIWA protocol. Continue thiamine, MVI, and folic acid  - Continue rifaximin and lactulose for hepatic encephalopathy. Still with asterixis, but having >2 BM's daily  - Restart prednisolone for acute alcoholic hepatitis, DF>32. Restart Nadolol for portal hypertension. Hold furosemide and spironolactone for now given borderline BP, but will restart slowly. Monitor transaminases, bilirubin, PT/INR  - HD and respiratory status stable  - Continue DASH/TLC diet + Ensure  - Stable kidney function and lytes, strict I/O's  - Continue aztreonam until blood cultures return. Diagnostic paracentesis negative for SBP. If cultures negative, d/c antibiotics  - Continue vitamin K for coagulopathy. Start enoxaparin for DVT ppx. F/up LE dopplers. Monitor thrombocytopenia (64) due to EtOH + cirrhosis. Hold enoxaparin if PLT<50  - Macrocytic anemia due to EtOH + liver disease. Continue folic acid supplementation. B12 level normal  - Discussed with patient at bedside, full code 50M PMH EtOH cirrhosis with portal hypertension, ascites, and hepatic encephalopathy who presents with acute alcoholic withdrawal with acute alcoholic hepatitis and liver failure. Now improved mental status.    - Continue lorazepam prn and CIWA protocol. Continue thiamine, MVI, and folic acid  - Continue rifaximin and lactulose for hepatic encephalopathy. Still with asterixis, but having >2 BM's daily  - Restart prednisolone for acute alcoholic hepatitis, DF>32. Restart Nadolol for portal hypertension. Hold furosemide and spironolactone for now given borderline BP, but will restart slowly. Monitor transaminases, bilirubin, PT/INR. Follows with hepatologist Dr. Drew ([phone])  - HD and respiratory status stable  - Continue DASH/TLC diet + Ensure  - Stable kidney function and lytes, strict I/O's  - Continue aztreonam until blood cultures return. Diagnostic paracentesis negative for SBP. If cultures negative, d/c antibiotics  - Continue vitamin K for coagulopathy. Start enoxaparin for DVT ppx. F/up LE dopplers. Monitor thrombocytopenia (64) due to EtOH + cirrhosis. Hold enoxaparin if PLT<50  - Macrocytic anemia due to EtOH + liver disease. Continue folic acid supplementation. B12 level normal  - Discussed with patient and girlfriend at bedside, full code

## 2020-07-13 NOTE — PROGRESS NOTE ADULT - PROBLEM SELECTOR PLAN 7
-s/p bleeding esophageal varices banding in 2017 -- no sign of bleeding at this time  -started on propranolol today as BP is more stable  -PT/INR elevated, trend platelets  -vitamin K ordered  -pt currently stable and comfortable with no active nausea, vomiting or other sign of GIB  -monitor for new onset hemoptysis or HD instability

## 2020-07-13 NOTE — DISCHARGE NOTE PROVIDER - CARE PROVIDER_API CALL
FOLLOWUP TODAY WITH HEPATOLOGIST DR GAMBOA AT [facility],   Phone: (   )    -  Fax: (   )    -  Follow Up Time:

## 2020-07-13 NOTE — SBIRT NOTE ADULT - NSSBIRTOFTENALCOHOL_GEN_A_CORE
4 or more times a week/Patient states that he often goes "3 months on, 3 months off" with his alcohol use.

## 2020-07-13 NOTE — DISCHARGE NOTE PROVIDER - HOSPITAL COURSE
FROM ADMISSION H+P:     HPI:    51 y/o M with a PMHx of alcohol dependence, liver cirrhosis, esophageal varices s/p banding in 2017, ascites s/p paracentesis 1 year ago, IBS, who comes in for detox and jaundice x 1 month. He states he relapsed in April after successfully quitting for 6 months previously. He has been admitted for detox multiple times in the past, in 2014 he was admitted at [facility] and 2017 at [facility]. He denies seizures in the past from withdrawal however he does endorse shaking, N, V when he doesnt drinks for 2-3 days. Pt states he drinks 1 quart of vodka daily and his last drink was yesterday around 3 PM. He follows with GI Dr. Drew at [facility] regularly for his cirrhosis. He noticed he was getting jaundiced about 1 month ago. He states the last time this happened his T bili was also elevated just like today. He also endorses falling 1 week ago onto his right hip. He has been able to walk on it but has pain when standing for a long time. Denies shooting pain into his leg, numbness tingling. He endorses chills, difficulty with balance, N, V of clear liquid when he doesn't drink for 2-3 days. He denies current fevers, chills, neck pain, visual changes, CP, SOB, cough, palpitations, abdominal pain, diarrhea, dysuria. He endorses chronic balance difficulties, abdominal distention without pain, yellowing of his skin and eyes, darkening of his urine, right hip pain.        ED course:    vitals: T 98.5,  --> 79, /74 --> 127/65, RR 18, SpO2 96% RA    significant labs: Hg 8.1 (last 13.2 in 2019), HCT 24.2 (last 37.4 in 2019), Na 129, K+ 3.4, Cl 93, , , T bili 23.5, Blood ETOH 30, PT/INR 42/3.8, U/A large bili, trace ketone, 30 protein, small blood,     imaging: CXR shows Grossly clear lungs    R hip Xray unofficial read: appears grossly normal    EKG: pending in ED (10 Jul 2020 21:51)            ---    HOSPITAL COURSE: Patient was admitted to the ICU after he grew more encephalopathic with an elevated ammonia level and agitated with a CIWA score of 16. He was given ativan and was placed on Librium initially. He was taken off librium with in his first day at the ICU, due to hepatic function labs that included elevated ammonia levels and elevated total bilirubin levels. He was given prednisolone to manage his alcohol hepatitis and placed on a lactulose and rifaximin regimen. To rule out bacterial peritonitis a paracentesis was done, and until cultures were read, the patient was placed on empiric aztreonam.           ---    CONSULTANTS:     Dr. Kenneth Zhang (Addiction Medicine)    Dr. Tony Cee (Gastroenterology)         ---    TIME SPENT:    I, the attending physician, was physically present for the key portions of the evaluation and management (E/M) service provided. The total amount of time spent reviewing the hospital notes, laboratory values, imaging findings, assessing/counseling the patient, discussing with consultant physicians, social work, nursing staff was -- minutes        ---    Primary care provider was made aware of plan for discharge:      [  ] NO     [  ] YES FROM ADMISSION H+P:     HPI:    51 y/o M with a PMHx of alcohol dependence, liver cirrhosis, esophageal varices s/p banding in 2017, ascites s/p paracentesis 1 year ago, IBS, who comes in for detox and jaundice x 1 month. He states he relapsed in April after successfully quitting for 6 months previously. He has been admitted for detox multiple times in the past, in 2014 he was admitted at [facility] and 2017 at [facility]. He denies seizures in the past from withdrawal however he does endorse shaking, N, V when he doesnt drinks for 2-3 days. Pt states he drinks 1 quart of vodka daily and his last drink was yesterday around 3 PM. He follows with GI Dr. Drew at [facility] regularly for his cirrhosis. He noticed he was getting jaundiced about 1 month ago. He states the last time this happened his T bili was also elevated just like today. He also endorses falling 1 week ago onto his right hip. He has been able to walk on it but has pain when standing for a long time. Denies shooting pain into his leg, numbness tingling. He endorses chills, difficulty with balance, N, V of clear liquid when he doesn't drink for 2-3 days. He denies current fevers, chills, neck pain, visual changes, CP, SOB, cough, palpitations, abdominal pain, diarrhea, dysuria. He endorses chronic balance difficulties, abdominal distention without pain, yellowing of his skin and eyes, darkening of his urine, right hip pain.        ED course:    vitals: T 98.5,  --> 79, /74 --> 127/65, RR 18, SpO2 96% RA    significant labs: Hg 8.1 (last 13.2 in 2019), HCT 24.2 (last 37.4 in 2019), Na 129, K+ 3.4, Cl 93, , , T bili 23.5, Blood ETOH 30, PT/INR 42/3.8, U/A large bili, trace ketone, 30 protein, small blood,     imaging: CXR shows Grossly clear lungs    R hip Xray unofficial read: appears grossly normal    EKG: pending in ED (10 Jul 2020 21:51)            ---    HOSPITAL COURSE: Patient was admitted to the ICU after he grew more encephalopathic with an elevated ammonia level and agitated with a CIWA score of 16. He was given ativan and was placed on Librium initially. He was taken off librium with in his first day at the ICU, due to hepatic function labs that included elevated ammonia levels and elevated total bilirubin levels. He was given prednisolone to manage his alcohol hepatitis and placed on a lactulose and rifaximin regimen. To rule out bacterial peritonitis a paracentesis was done, and until cultures were read, the patient was placed on empiric aztreonam - w/u remained neg for SBP and abxs were d/c'd. Pt tx'd for acute hepatitis, decompensated cirrhosis w/AMS/hepatic encephalopathy, anemia/thrombocytopenia, coagulopathy, hemodynamic compromise w/hypoTN, etoh w/dwl - pt tx'd w/prednisolone, IV albumin, ativan/librium protocol - pt hemodynamically improved, mental status improved - pt transferred to Telemetry. Arrangements made to transfer pt to his hepatology team at [facility] - Dr Drew for continued management of decompensated liver failure; pt to be admitted to Hospitalist service Dr Amie Madera service.         ---    CONSULTANTS:     Dr. Kenneth Zhang (Addiction Medicine)    Dr. Tony Cee (Gastroenterology)         ---    TIME SPENT:    I, the attending physician, was physically present for the key portions of the evaluation and management (E/M) service provided. The total amount of time spent reviewing the hospital notes, laboratory values, imaging findings, assessing/counseling the patient, discussing with consultant physicians, social work, nursing staff was -- minutes        ---    Primary care provider was made aware of plan for discharge:      [  ] NO     [  ] YES FROM ADMISSION H+P:     HPI:    49 y/o M with a PMHx of alcohol dependence, liver cirrhosis, esophageal varices s/p banding in 2017, ascites s/p paracentesis 1 year ago, IBS, who comes in for detox and jaundice x 1 month. He states he relapsed in April after successfully quitting for 6 months previously. He has been admitted for detox multiple times in the past, in 2014 he was admitted at [facility] and 2017 at [facility]. He denies seizures in the past from withdrawal however he does endorse shaking, N, V when he doesnt drinks for 2-3 days. Pt states he drinks 1 quart of vodka daily and his last drink was yesterday around 3 PM. He follows with GI Dr. Drew at [facility] regularly for his cirrhosis. He noticed he was getting jaundiced about 1 month ago. He states the last time this happened his T bili was also elevated just like today. He also endorses falling 1 week ago onto his right hip. He has been able to walk on it but has pain when standing for a long time. Denies shooting pain into his leg, numbness tingling. He endorses chills, difficulty with balance, N, V of clear liquid when he doesn't drink for 2-3 days. He denies current fevers, chills, neck pain, visual changes, CP, SOB, cough, palpitations, abdominal pain, diarrhea, dysuria. He endorses chronic balance difficulties, abdominal distention without pain, yellowing of his skin and eyes, darkening of his urine, right hip pain.        ED course:    vitals: T 98.5,  --> 79, /74 --> 127/65, RR 18, SpO2 96% RA    significant labs: Hg 8.1 (last 13.2 in 2019), HCT 24.2 (last 37.4 in 2019), Na 129, K+ 3.4, Cl 93, , , T bili 23.5, Blood ETOH 30, PT/INR 42/3.8, U/A large bili, trace ketone, 30 protein, small blood,     imaging: CXR shows Grossly clear lungs    R hip Xray unofficial read: appears grossly normal    EKG: pending in ED (10 Jul 2020 21:51)            ---    HOSPITAL COURSE: Patient was admitted to the ICU after he grew more encephalopathic with an elevated ammonia level and agitated with a CIWA score of 16. He was given ativan and was placed on Librium initially. He was taken off librium with in his first day at the ICU, due to hepatic function labs that included elevated ammonia levels and elevated total bilirubin levels. He was given prednisolone to manage his alcohol hepatitis and placed on a lactulose and rifaximin regimen. To rule out bacterial peritonitis a paracentesis was done, and until cultures were read, the patient was placed on empiric aztreonam - w/u remained neg for SBP and abxs were d/c'd. Pt tx'd for acute hepatitis, decompensated cirrhosis w/AMS/hepatic encephalopathy, anemia/thrombocytopenia, coagulopathy, hemodynamic compromise w/hypoTN, etoh w/dwl - pt tx'd w/prednisolone, IV albumin, ativan/librium protocol - pt hemodynamically improved, mental status improved - pt transferred to Telemetry. Hepatology team/Dr Drew at [facility] called, and requested transfer of patient to their team at [facility], where patient usually follows. Arrangements made via Transfer Center to transfer pt to his hepatology team at [facility], Dr Drew,  for continued management of decompensated liver failure; pt to be admitted to Hospitalist service Dr Amie Madera service.         ---    CONSULTANTS:     Dr. Kenneth Zhang (Addiction Medicine)    Dr. Tony Cee (Gastroenterology)         ---    TIME SPENT:    I, the attending physician, was physically present for the key portions of the evaluation and management (E/M) service provided. The total amount of time spent reviewing the hospital notes, laboratory values, imaging findings, assessing/counseling the patient, discussing with consultant physicians, social work, nursing staff was -- minutes        ---    Primary care provider was made aware of plan for discharge:      [  ] NO     [  ] YES

## 2020-07-13 NOTE — PROGRESS NOTE ADULT - PROBLEM SELECTOR PLAN 8
-spironolactone and lasix on hold as pt was hypotensive overnight   -propranolol started  -monitor BP

## 2020-07-13 NOTE — PROGRESS NOTE ADULT - ATTENDING COMMENTS
Note written by attending, see above.  Time spent: 40min. More than 50% of the visit was spent counseling the patient on his condition - alcohol withdrawal, DTs, liver failure, MELD score, ascites, paracentesis.

## 2020-07-13 NOTE — DISCHARGE NOTE PROVIDER - NSDCFUSCHEDAPPT_GEN_ALL_CORE_FT
LASHANDA AGUILAR ; 08/05/2020 ; [facility] [address] [place] [address] LASHANDA AGUILAR ; 08/05/2020 ; [facility] [address] [place] [address] LASHANDA AGUILAR ; 08/05/2020 ; [facility] [address] [place] [address]

## 2020-07-13 NOTE — PROGRESS NOTE ADULT - ASSESSMENT
50 year old man with Hx of EtOH cirrhosis, complicated by ascites, EV presenting with EtOH withdrawal and EtOH hepatitis with liver failure.     - NEURO:  - CV:  - PULM:  - GI:  - RENAL:  - ID:  - HEME:  - ENDO:  - SKIN:    NEURO: EtOH withdrawal, came in agitated, CIWA 8-16 w PRN Ativan (x1 overnight), off librium, continue folate, multivitamin, and thiamine   CV: Hypotensive, d/c htn meds and diuretics - amlodipine, spironolactone, propranolol.        - Give albumin if hypotension persists        - Right leg swelling, f/u doppler   PULM: no acute issues  GI: Suspected bacterial peritonitis: paracentesis done- pending cultures, stared on aztreonam. EtOH hepatitis with liver failure- continue on prednisolone, f/u full hepatic panel and ammonia levels, Tbilli 25.4 trending up, rifaximin and lactulose added, monitor for BMs    - RENAL: renal function seems acceptable, monitor electrolytes and replete as needed  - ID: aztreonam for possible bacterial peritonitis  - HEME: Daily coags, no evidence of bleeding.   - SKIN: Monitor ecchymosis on left leg and right chest. 50 year old man with Hx of EtOH cirrhosis, complicated by ascites, EV presenting with EtOH withdrawal and EtOH hepatitis with liver failure.     NEURO: EtOH withdrawal, came in agitated, CIWA 4-16 w PRN Ativan (x1 overnight), off librium, continue folate, multivitamin, and thiamine   CV: Hypotensive, d/c- amlodipine and spironolactone for now.        - Started Propranolol 40 mg q8        - Give albumin if hypotension persists        - Right leg swelling, doppler 7/13 - no DVT, simple rt popliteal cyst   PULM: no acute issues  GI: Suspected bacterial peritonitis: paracentesis done- pending cultures, stared on aztreonam.            - EtOH hepatitis with liver failure- continue on prednisolone, f/u hepatic panel, LFTs trending down, Tbilli elevated at 24, rifaximin and lactulose, monitor for BMs    - RENAL: monitor electrolytes and replete as needed  - ID: aztreonam- r/o bacterial peritonitis, pending culture   - HEME: Daily coags, no evidence of bleeding.   - SKIN: Monitor ecchymosis on left leg and right chest.

## 2020-07-13 NOTE — DISCHARGE NOTE PROVIDER - PROVIDER TOKENS
FREE:[LAST:[FOLLOWUP TODAY WITH HEPATOLOGIST DR GAMBOA AT [facility]],PHONE:[(   )    -],FAX:[(   )    -]]

## 2020-07-13 NOTE — PROGRESS NOTE ADULT - SUBJECTIVE AND OBJECTIVE BOX
Date/Time Patient Seen:  		  Referring MD:   Data Reviewed	       Patient is a 50y old  Male who presents with a chief complaint of alcohol withdrawal, decompensated liver failure (2020 20:16)      Subjective/HPI     PAST MEDICAL & SURGICAL HISTORY:  IBS (irritable bowel syndrome)  HTN (hypertension)  Ascites due to alcoholic cirrhosis  Cirrhosis of liver  ETOH abuse  Esophageal varices with bleedin2017  History of ankle surgery        Medication list         MEDICATIONS  (STANDING):  aztreonam  IVPB      aztreonam  IVPB 1000 milliGRAM(s) IV Intermittent every 8 hours  ergocalciferol 53971 Unit(s) Oral every week  famotidine    Tablet 20 milliGRAM(s) Oral two times a day  folic acid 1 milliGRAM(s) Oral daily  lactulose Syrup 20 Gram(s) Oral four times a day  magnesium oxide 400 milliGRAM(s) Oral daily  multivitamin 1 Tablet(s) Oral daily  phytonadione   Solution 5 milliGRAM(s) Oral daily  rifAXIMin 550 milliGRAM(s) Oral two times a day  thiamine 100 milliGRAM(s) Oral daily    MEDICATIONS  (PRN):  LORazepam   Injectable 2 milliGRAM(s) IV Push every 2 hours PRN Symptom-triggered: 2 point increase in CIWA -Ar score and a total score of 7 or LESS  LORazepam   Injectable 2 milliGRAM(s) IV Push every 1 hour PRN Symptom-triggered: each CIWA -Ar score 8 or GREATER  ondansetron Injectable 4 milliGRAM(s) IV Push every 6 hours PRN Nausea         Vitals log        ICU Vital Signs Last 24 Hrs  T(C): 36.7 (2020 23:36), Max: 37 (2020 15:57)  T(F): 98 (2020 23:36), Max: 98.6 (2020 15:57)  HR: 59 (2020 07:00) (54 - 100)  BP: 99/56 (2020 07:00) (82/59 - 129/97)  BP(mean): 70 (2020 07:00) (63 - 100)  ABP: --  ABP(mean): --  RR: 12 (2020 07:00) (9 - 26)  SpO2: 98% (2020 07:00) (89% - 99%)           Input and Output:  I&O's Detail    2020 07:01  -  2020 07:00  --------------------------------------------------------  IN:    Solution: 100 mL  Total IN: 100 mL    OUT:    Voided: 750 mL  Total OUT: 750 mL    Total NET: -650 mL          Lab Data                        7.7    10.05 )-----------( 64       ( 2020 05:17 )             24.1     07-13    137  |  102  |  16  ----------------------------<  96  3.9   |  27  |  1.20    Ca    8.4<L>      2020 05:17  Phos  2.5     0713  Mg     2.2         TPro  6.3  /  Alb  1.9<L>  /  TBili  24.0<HH>  /  DBili  x   /  AST  107<H>  /  ALT  44  /  AlkPhos  103  07-13            Review of Systems	      Objective     Physical Examination    heart s1s2  lung dec BS  abd soft      Pertinent Lab findings & Imaging      Annel:  NO   Adequate UO     I&O's Detail    2020 07:01  -  2020 07:00  --------------------------------------------------------  IN:    Solution: 100 mL  Total IN: 100 mL    OUT:    Voided: 750 mL  Total OUT: 750 mL    Total NET: -650 mL               Discussed with:     Cultures:	        Radiology

## 2020-07-13 NOTE — DISCHARGE NOTE PROVIDER - NSDCCPCAREPLAN_GEN_ALL_CORE_FT
PRINCIPAL DISCHARGE DIAGNOSIS  Diagnosis: Liver failure, acute  Assessment and Plan of Treatment:       SECONDARY DISCHARGE DIAGNOSES  Diagnosis: Alcohol withdrawal  Assessment and Plan of Treatment:     Diagnosis: ETOH abuse  Assessment and Plan of Treatment:

## 2020-07-13 NOTE — PROGRESS NOTE ADULT - PROBLEM SELECTOR PLAN 6
-pt has h/o of paracentesis ~1 year ago with 2 L removed, 2/2 cirrhosis from chronic ETOH abuse  -no current abdominal discomfort or SOB or fever making SBP less likely  -had diagnostic paracentesis last night -- ascites studies not indicative of SBP  -empirically on azactam which will be discontinued if BCx are negative  -spironolactone and lasix on hold for now as pt's BP is relatively low

## 2020-07-13 NOTE — PROGRESS NOTE ADULT - PROBLEM SELECTOR PLAN 5
-hyponatremia, hypokalemia, hypomagnesemia   -pt endorses chronic hyponatremia 2/2 cirrhosis  -repleted electrolytes  -f/u lytes  -given QT prolongation, will aim for Mg > 2.

## 2020-07-13 NOTE — SBIRT NOTE ADULT - NSSBIRTDRGPOSREINDET_GEN_A_CORE
provided positive reinforcement regarding abstaining from the use of drugs other than those required for medical reasons.

## 2020-07-13 NOTE — SBIRT NOTE ADULT - NSSBIRTALCPASSREFTXDET_GEN_A_CORE
Patient tells  that he is interested in pursuing inpatient alcohol use treatment once medically stable for discharge; he identifies that he has received inpatient alcohol use treatment at [facility] in the past and would be interested in a referral there.

## 2020-07-13 NOTE — PROGRESS NOTE ADULT - PROBLEM SELECTOR PLAN 4
-Hgb ~8 relatively stable now -- (previously 13.2 in 2019),   -pt denies bleeding, hemoptysis, hematemesis, melena, hematuria  -FOBT ordered  -anemia panel --B12 and folate are normal. Ferritin >700. -- likely does not require any supplementation for anemia  -vitamin K PO ordered  -trend CBC, PT/INR  -transfuse if Hgb <7

## 2020-07-13 NOTE — PROGRESS NOTE ADULT - PROBLEM SELECTOR PLAN 1
on Azactam and Rifaximin -   s/p Diagnostic Paracentesis -   end stage Cirrhosis - active drinker - GERARDO - high MELD and DF  GI follow up noted  on Ativan PRN for GERARDO  labs and imaging reviewed  prognosis POOR - active drinker and smoker   may need transfer to [facility] - for Hepatology Specialty   coagulopathy and pancytopenia due to cirrhosis and etoh use

## 2020-07-13 NOTE — DISCHARGE NOTE PROVIDER - NSDCMRMEDTOKEN_GEN_ALL_CORE_FT
amLODIPine 10 mg oral tablet: 1 tab(s) orally once a day  folic acid 1 mg oral tablet: 1 tab(s) orally once a day  furosemide 20 mg oral tablet: 1 tab(s) orally once a day  magnesium oxide 400 mg (240 mg elemental magnesium) oral tablet: 1 tab(s) orally once a day  nadolol 40 mg oral tablet: 1 tab(s) orally once a day  raNITIdine 150 mg oral tablet: 2 tab(s) orally once a day (at bedtime)  spironolactone 100 mg oral tablet: 1.5 tab(s) orally once a day  Vitamin B1 50 mg oral tablet: 2 tab(s) orally once a day  Vitamin D2 50,000 intl units (1.25 mg) oral capsule: 1 cap(s) orally once a week  Xifaxan 550 mg oral tablet: 1 tab(s) orally 2 times a day famotidine 20 mg oral tablet: 1 tab(s) orally 2 times a day  folic acid 1 mg oral tablet: 1 tab(s) orally once a day  furosemide 20 mg oral tablet: 1 tab(s) orally once a day  lactulose 10 g/15 mL oral syrup: 30 milliliter(s) orally 4 times a day  LORazepam:   magnesium oxide 400 mg (241.3 mg elemental magnesium) oral tablet: 1 tab(s) orally once a day  Multiple Vitamins oral tablet: 1 tab(s) orally once a day  ondansetron 2 mg/mL injectable solution:  injectable   prednisoLONE (as sodium phosphate) 15 mg/5 mL oral liquid: 13.33 milliliter(s) orally once a day  prednisoLONE (as sodium phosphate) 15 mg/5 mL oral liquid: 10 milliliter(s) orally once a day  prednisoLONE (as sodium phosphate) 15 mg/5 mL oral liquid: 6.67 milliliter(s) orally once a day  prednisoLONE (as sodium phosphate) 15 mg/5 mL oral liquid: 3.33 milliliter(s) orally once a day  propranolol 10 mg oral tablet: 1 tab(s) orally every 12 hours  rifAXIMin 550 mg oral tablet: 1 tab(s) orally 2 times a day  thiamine 100 mg oral tablet: 1 tab(s) orally once a day

## 2020-07-13 NOTE — PROGRESS NOTE ADULT - ASSESSMENT
Assessment and Recommendation:    Problem/Recommendation - 1:  Problem: Alcoholic hepatitis with ascites  meld today ~ 35  US reviewed, cw cirrhosis, ascites, and gs wo evidence of acute porfirio  sp dx tap; saag cw phtn, no sbp by cell count; f/u final cx and cyto  cont prednisolone for high df   cont ppx abx  cont rifaximin and lactulose- titrate for 3-4 soft bms/day  cont pepcid, vit k x 3 days, propanolol w parameters  avoid hepatotoxins; monitor renal fxn; daily meld labs  low na/pro diet as tolerated when awake/alert  cont icu care     Problem/Recommendation - 2:  ·  Problem: Liver failure, acute.  Recommendation: as above  decompensated cirrhosis  low threshold to transfer to [facility]     Problem/Recommendation - 3:  ·  Problem: Esophageal varices with bleeding.  Recommendation:   no s/s active gib  continue BB     Problem/Recommendation - 4:  ·  Problem: ETOH abuse.  Recommendation: CIWA protocol

## 2020-07-13 NOTE — PROGRESS NOTE ADULT - SUBJECTIVE AND OBJECTIVE BOX
Time of evaluation:     Called to evaluate patient for restraints for patient safety. pt trying to get out of bed.         Other interventions attempted: de-escalation, orientation check, environment modification, medication assessment    REVIEW OF SYSTEMS:  CONSTITUTIONAL: [  ] fever   [  ] fatigue  EYES: [  ] visual disturbances  ENMT:  [  ]difficulty hearing  [  ] throat pain  RESPIRATORY:  [  ]cough  [   ] wheezing  [   ] hemoptysis [  ] shortness of breath  CARDIOVASCULAR: [  ]chest pain  [  ] palpitations   GASTROINTESTINAL: [  ]  abdominal pain [  ] nausea [  ] vomiting  [  ] diarrhea  [  ] constipation  GENITOURINARY: [  ] dysuria [  ] frequency  [  ] hematuria [  ] incontinence  NEUROLOGICAL: [  ] headaches [  ] new numbness  SKIN: [  ]itching [  ] new rash   MUSCULOSKELETAL: [  ] back pain  [  ] extremity pain  PSYCHIATRIC: [  ] depression  [  ] anxiety  [  ] mood swings [  ] difficulty sleeping  ALLERGY AND IMMUNOLOGIC: [  ] hives    [ x ]Unable to perfrom ROS due to: AMS  [  ] ROS reviewed and all negative    PAST MEDICAL & SURGICAL HISTORY:  IBS (irritable bowel syndrome)  HTN (hypertension)  Ascites due to alcoholic cirrhosis  Cirrhosis of liver  ETOH abuse  Esophageal varices with bleedin2017  History of ankle surgery    MEDICATIONS  (STANDING):  aztreonam  IVPB      aztreonam  IVPB 1000 milliGRAM(s) IV Intermittent every 8 hours  enoxaparin Injectable 40 milliGRAM(s) SubCutaneous daily  ergocalciferol 02338 Unit(s) Oral every week  famotidine    Tablet 20 milliGRAM(s) Oral two times a day  folic acid 1 milliGRAM(s) Oral daily  lactulose Syrup 20 Gram(s) Oral four times a day  magnesium oxide 400 milliGRAM(s) Oral daily  multivitamin 1 Tablet(s) Oral daily  phytonadione   Solution 5 milliGRAM(s) Oral daily  prednisoLONE    3 mG/mL Solution (ORAPRED) 40 milliGRAM(s) Oral daily  propranolol 40 milliGRAM(s) Oral every 8 hours  rifAXIMin 550 milliGRAM(s) Oral two times a day  thiamine 100 milliGRAM(s) Oral daily    MEDICATIONS  (PRN):  LORazepam   Injectable 2 milliGRAM(s) IV Push every 2 hours PRN Symptom-triggered: 2 point increase in CIWA -Ar score and a total score of 7 or LESS  LORazepam   Injectable 2 milliGRAM(s) IV Push every 1 hour PRN Symptom-triggered: each CIWA -Ar score 8 or GREATER  ondansetron Injectable 4 milliGRAM(s) IV Push every 6 hours PRN Nausea                          7.7    10.05 )-----------( 64       ( 2020 05:17 )             24.1     07-13    137  |  102  |  16  ----------------------------<  96  3.9   |  27  |  1.20    Ca    8.4<L>      2020 05:17  Phos  2.5     07-13  Mg     2.2     07-13    TPro  6.3  /  Alb  1.9<L>  /  TBili  24.0<HH>  /  DBili  x   /  AST  107<H>  /  ALT  44  /  AlkPhos  103  07-13      Vital Signs Last 24 Hrs  T(C): 36.8 (2020 20:05), Max: 36.8 (2020 19:57)  T(F): 98.3 (2020 20:05), Max: 98.3 (2020 19:57)  HR: 62 (2020 20:00) (58 - 100)  BP: 107/62 (2020 20:00) (88/51 - 159/84)  BP(mean): 79 (2020 20:00) (63 - 114)  RR: 10 (2020 20:00) (10 - 26)  SpO2: 98% (2020 20:00) (89% - 100%)    Physical Examination:  General: No acute distress.    HEENT: Pupils equal, round, reactive to light. Symmetric.  PULM: Clear to auscultation bilaterally, no significant sputum production  CVS: Regular rate and rhythm, no murmurs, rubs, or gallops  ABD: Soft, nondistended, nontender, normoactive bowel sounds, no masses  EXT: No edema, nontender  SKIN: Warm and well perfused.      [  ] Unable to perform physical exam due to    [X ] I have evaluated this patient and have determined that restraints are warranted to optimize medical care. Patient was assessed for current physical and psychological risk factors as well as special needs. There are no medical conditions or limitations that would place this patient at risk while in restraints.    Type of restraint: soft vest     Behavioral criteria for discontinuation of restraint: [ X ] See order    Attending MD Aware

## 2020-07-13 NOTE — PROGRESS NOTE ADULT - SUBJECTIVE AND OBJECTIVE BOX
Patient is a 50y old  Male who presents with a chief complaint of alcohol withdrawal, jaundice.     INTERVAL HPI/OVERNIGHT EVENTS: Had paracentesis yesterday evening, ascites fluid not consistent with SBP. Pt's mentation is improving and he was able to answer questions with organized answers. Denies fever, chills, abd pain, SOB, CP, palpitations, nausea, vomiting, melena, hematochezia. Admits to mild tremor in his hands.    MEDICATIONS  (STANDING):  aztreonam  IVPB      aztreonam  IVPB 1000 milliGRAM(s) IV Intermittent every 8 hours  enoxaparin Injectable 40 milliGRAM(s) SubCutaneous daily  ergocalciferol 47464 Unit(s) Oral every week  famotidine    Tablet 20 milliGRAM(s) Oral two times a day  folic acid 1 milliGRAM(s) Oral daily  lactulose Syrup 20 Gram(s) Oral four times a day  magnesium oxide 400 milliGRAM(s) Oral daily  multivitamin 1 Tablet(s) Oral daily  phytonadione   Solution 5 milliGRAM(s) Oral daily  prednisoLONE    3 mG/mL Solution (ORAPRED) 40 milliGRAM(s) Oral daily  propranolol 40 milliGRAM(s) Oral every 8 hours  rifAXIMin 550 milliGRAM(s) Oral two times a day  thiamine 100 milliGRAM(s) Oral daily    MEDICATIONS  (PRN):  LORazepam   Injectable 2 milliGRAM(s) IV Push every 2 hours PRN Symptom-triggered: 2 point increase in CIWA -Ar score and a total score of 7 or LESS  LORazepam   Injectable 2 milliGRAM(s) IV Push every 1 hour PRN Symptom-triggered: each CIWA -Ar score 8 or GREATER  ondansetron Injectable 4 milliGRAM(s) IV Push every 6 hours PRN Nausea      Allergies    penicillin (Unknown)    Intolerances        REVIEW OF SYSTEMS:  CONSTITUTIONAL: No fever or chills  HEENT:  No headache, no sore throat  RESPIRATORY: No cough, wheezing, or shortness of breath  CARDIOVASCULAR: No chest pain, palpitations  GASTROINTESTINAL: admits abdominal distension; No abd pain, nausea, vomiting, or diarrhea  GENITOURINARY: No dysuria, frequency, or hematuria  NEUROLOGICAL: no focal weakness or dizziness  MUSCULOSKELETAL: no myalgias   PSYCH: hand tremor     Vital Signs Last 24 Hrs  T(C): 36.7 (2020 23:36), Max: 37 (2020 15:57)  T(F): 98 (2020 23:36), Max: 98.6 (2020 15:57)  HR: 68 (2020 11:00) (58 - 100)  BP: 108/56 (2020 11:00) (88/51 - 129/97)  BP(mean): 74 (2020 11:00) (63 - 100)  RR: 11 (2020 11:00) (11 - 26)  SpO2: 93% (2020 10:00) (89% - 99%)    PHYSICAL EXAM:  GENERAL: NAD, ++jaundiced  HEENT:  +icteric, moist mucous membranes  CHEST/LUNG:  CTA b/l, no rales, wheezes, or rhonchi  HEART:  RRR, S1, S2  ABDOMEN:  BS+, soft, nontender, +distended; +ascites  EXTREMITIES: no cyanosis or calf tenderness  NERVOUS SYSTEM: AA&Ox3, answers questions somewhat slowly  PSYCH: +hand tremors    LABS:                        7.7    10.05 )-----------( 64       ( 2020 05:17 )             24.1     CBC Full  -  ( 2020 05:17 )  WBC Count : 10.05 K/uL  Hemoglobin : 7.7 g/dL  Hematocrit : 24.1 %  Platelet Count - Automated : 64 K/uL  Mean Cell Volume : 124.2 fl  Mean Cell Hemoglobin : 39.7 pg  Mean Cell Hemoglobin Concentration : 32.0 gm/dL  Auto Neutrophil # : 7.09 K/uL  Auto Lymphocyte # : 1.07 K/uL  Auto Monocyte # : 1.44 K/uL  Auto Eosinophil # : 0.11 K/uL  Auto Basophil # : 0.03 K/uL  Auto Neutrophil % : 70.6 %  Auto Lymphocyte % : 10.6 %  Auto Monocyte % : 14.3 %  Auto Eosinophil % : 1.1 %  Auto Basophil % : 0.3 %    2020 05:17    137    |  102    |  16     ----------------------------<  96     3.9     |  27     |  1.20     Ca    8.4        2020 05:17  Phos  2.5       2020 05:17  Mg     2.2       2020 05:17    TPro  6.3    /  Alb  1.9    /  TBili  24.0   /  DBili  x      /  AST  107    /  ALT  44     /  AlkPhos  103    2020 05:17    PT/INR - ( 2020 05:17 )   PT: 40.0 sec;   INR: 3.64 ratio         PTT - ( 2020 05:17 )  PTT:50.9 sec  Urinalysis Basic - ( 2020 17:16 )    Color: Claudine / Appearance: Slightly Turbid / S.010 / pH: x  Gluc: x / Ketone: Negative  / Bili: Large / Urobili: 12   Blood: x / Protein: Negative / Nitrite: Positive   Leuk Esterase: Trace / RBC: 0-2 /HPF / WBC 3-5   Sq Epi: x / Non Sq Epi: Occasional / Bacteria: Few      CAPILLARY BLOOD GLUCOSE            Culture - Fungal, Body Fluid (collected 20 @ 00:56)  Source: .Body Fluid Peritoneal Fluid  Preliminary Report (20 @ 08:34):    Testing in progress    Culture - Body Fluid with Gram Stain (collected 20 @ 00:56)  Source: .Body Fluid Peritoneal Fluid  Gram Stain (20 @ 03:58):    polymorphonuclear leukocytes seen    No organisms seen    by cytocentrifuge            RADIOLOGY & ADDITIONAL TESTS:    Personally reviewed.     Consultant(s) Notes Reviewed:  [x] YES  [ ] NO Patient is a 50y old  Male who presents with a chief complaint of alcohol withdrawal, jaundice.      INTERVAL HPI/OVERNIGHT EVENTS: Had paracentesis yesterday evening, ascites fluid not consistent with SBP. Pt's mentation is improving and he was able to answer questions with organized answers. Denies fever, chills, abd pain, SOB, CP, palpitations, nausea, vomiting, melena, hematochezia. Admits to mild tremor in his hands.    MEDICATIONS  (STANDING):  aztreonam  IVPB      aztreonam  IVPB 1000 milliGRAM(s) IV Intermittent every 8 hours  enoxaparin Injectable 40 milliGRAM(s) SubCutaneous daily  ergocalciferol 15029 Unit(s) Oral every week  famotidine    Tablet 20 milliGRAM(s) Oral two times a day  folic acid 1 milliGRAM(s) Oral daily  lactulose Syrup 20 Gram(s) Oral four times a day  magnesium oxide 400 milliGRAM(s) Oral daily  multivitamin 1 Tablet(s) Oral daily  phytonadione   Solution 5 milliGRAM(s) Oral daily  prednisoLONE    3 mG/mL Solution (ORAPRED) 40 milliGRAM(s) Oral daily  propranolol 40 milliGRAM(s) Oral every 8 hours  rifAXIMin 550 milliGRAM(s) Oral two times a day  thiamine 100 milliGRAM(s) Oral daily    MEDICATIONS  (PRN):  LORazepam   Injectable 2 milliGRAM(s) IV Push every 2 hours PRN Symptom-triggered: 2 point increase in CIWA -Ar score and a total score of 7 or LESS  LORazepam   Injectable 2 milliGRAM(s) IV Push every 1 hour PRN Symptom-triggered: each CIWA -Ar score 8 or GREATER  ondansetron Injectable 4 milliGRAM(s) IV Push every 6 hours PRN Nausea      Allergies    penicillin (Unknown)    Intolerances        REVIEW OF SYSTEMS:  CONSTITUTIONAL: No fever or chills  HEENT:  No headache, no sore throat  RESPIRATORY: No cough, wheezing, or shortness of breath  CARDIOVASCULAR: No chest pain, palpitations  GASTROINTESTINAL: admits abdominal distension; No abd pain, nausea, vomiting, or diarrhea  GENITOURINARY: No dysuria, frequency, or hematuria  NEUROLOGICAL: no focal weakness or dizziness  MUSCULOSKELETAL: no myalgias   PSYCH: hand tremor     Vital Signs Last 24 Hrs  T(C): 36.7 (2020 23:36), Max: 37 (2020 15:57)  T(F): 98 (2020 23:36), Max: 98.6 (2020 15:57)  HR: 68 (2020 11:00) (58 - 100)  BP: 108/56 (2020 11:00) (88/51 - 129/97)  BP(mean): 74 (2020 11:00) (63 - 100)  RR: 11 (2020 11:00) (11 - 26)  SpO2: 93% (2020 10:00) (89% - 99%)    PHYSICAL EXAM:  GENERAL: NAD, ++jaundiced  HEENT:  +icteric, moist mucous membranes  CHEST/LUNG:  CTA b/l, no rales, wheezes, or rhonchi  HEART:  RRR, S1, S2  ABDOMEN:  BS+, soft, nontender, +distended; +ascites  EXTREMITIES: no cyanosis or calf tenderness  NERVOUS SYSTEM: AA&Ox3, answers questions somewhat slowly  PSYCH: +hand tremors    LABS:                        7.7    10.05 )-----------( 64       ( 2020 05:17 )             24.1     CBC Full  -  ( 2020 05:17 )  WBC Count : 10.05 K/uL  Hemoglobin : 7.7 g/dL  Hematocrit : 24.1 %  Platelet Count - Automated : 64 K/uL  Mean Cell Volume : 124.2 fl  Mean Cell Hemoglobin : 39.7 pg  Mean Cell Hemoglobin Concentration : 32.0 gm/dL  Auto Neutrophil # : 7.09 K/uL  Auto Lymphocyte # : 1.07 K/uL  Auto Monocyte # : 1.44 K/uL  Auto Eosinophil # : 0.11 K/uL  Auto Basophil # : 0.03 K/uL  Auto Neutrophil % : 70.6 %  Auto Lymphocyte % : 10.6 %  Auto Monocyte % : 14.3 %  Auto Eosinophil % : 1.1 %  Auto Basophil % : 0.3 %    2020 05:17    137    |  102    |  16     ----------------------------<  96     3.9     |  27     |  1.20     Ca    8.4        2020 05:17  Phos  2.5       2020 05:17  Mg     2.2       2020 05:17    TPro  6.3    /  Alb  1.9    /  TBili  24.0   /  DBili  x      /  AST  107    /  ALT  44     /  AlkPhos  103    2020 05:17    PT/INR - ( 2020 05:17 )   PT: 40.0 sec;   INR: 3.64 ratio         PTT - ( 2020 05:17 )  PTT:50.9 sec  Urinalysis Basic - ( 2020 17:16 )    Color: Claudine / Appearance: Slightly Turbid / S.010 / pH: x  Gluc: x / Ketone: Negative  / Bili: Large / Urobili: 12   Blood: x / Protein: Negative / Nitrite: Positive   Leuk Esterase: Trace / RBC: 0-2 /HPF / WBC 3-5   Sq Epi: x / Non Sq Epi: Occasional / Bacteria: Few      CAPILLARY BLOOD GLUCOSE            Culture - Fungal, Body Fluid (collected 20 @ 00:56)  Source: .Body Fluid Peritoneal Fluid  Preliminary Report (20 @ 08:34):    Testing in progress    Culture - Body Fluid with Gram Stain (collected 20 @ 00:56)  Source: .Body Fluid Peritoneal Fluid  Gram Stain (20 @ 03:58):    polymorphonuclear leukocytes seen    No organisms seen    by cytocentrifuge            RADIOLOGY & ADDITIONAL TESTS:    Personally reviewed.     Consultant(s) Notes Reviewed:  [x] YES  [ ] NO

## 2020-07-13 NOTE — SBIRT NOTE ADULT - NSSBIRTSIXOCC_GEN_A_CORE
Daily/Patient identifies that he typically drinks a quart of vodka in a sitting on days when he is drinking.

## 2020-07-13 NOTE — PROGRESS NOTE ADULT - SUBJECTIVE AND OBJECTIVE BOX
Patient is a 50y old  Male who presents with a chief complaint of alcohol withdrawl, elevated bilirubin (2020 07:31)    50 year old man with Hx of EtOH cirrhosis, complicated by ascites, EV presenting with EtOH withdrawal and EtOH hepatitis with liver failure.      50y M with a PMHx of alcohol dependence, liver cirrhosis, esophageal varices s/p banding in 2017, ascites s/p paracentesis 1 year ago, IBS, who comes in for detox and jaundice x 1 month. He states he relapsed in April after successfully quitting for 6 months previously. He has been admitted for detox multiple times in the past, in  he was admitted at [facility] and 2017 at [facility]. He denies seizures in the past from withdrawal however he does endorse shaking, N, V when he doesnt drinks for 2-3 days. Pt states he drinks 1 quart of vodka daily and his last drink was yesterday around 3 PM. He follows with GI Dr. Drew at [facility] regularly for his cirrhosis. He noticed he was getting jaundiced about 1 month ago. He states the last time this happened his T bili was also elevated just like today. He also endorses falling 1 week ago onto his right hip. He has been able to walk on it but has pain when standing for a long time. Denies shooting pain into his leg, numbness tingling. He endorses chills, difficulty with balance, N, V of clear liquid when he doesn't drink for 2-3 days. He denies current fevers, chills, neck pain, visual changes, CP, SOB, cough, palpitations, abdominal pain, diarrhea, dysuria. He endorses chronic balance difficulties, abdominal distention without pain, yellowing of his skin and eyes,   darkening of his urine, right hip pain.      24 hour events: CIWA 8-16 in past 24 hrs, had PRN ativan 2mg x1 overnight.   Paracentesis yesterday and started on aztreonam yesterday         REVIEW OF SYSTEMS  Constitutional: No fever, chills, fatigue  Neuro: No headache, numbness, weakness  Resp: No cough, wheezing, shortness of breath  CVS: No chest pain, palpitations, leg swelling  GI: No abdominal pain, nausea, vomiting, diarrhea   : No dysuria, frequency, incontinence  Skin: No itching, burning, rashes, or lesions   Msk: No joint pain or swelling  Psych: No depression, anxiety, mood swings  Heme: No bleeding    T(F): 98 (20 @ 23:36), Max: 98.6 (20 @ 15:57)  HR: 59 (20 @ 07:00) (54 - 100)  BP: 99/56 (20 @ 07:00) (82/59 - 129/97)  RR: 12 (20 @ 07:00) (9 - 26)  SpO2: 98% (20 @ 07:00) (89% - 99%)  Wt(kg): --            I&O's Summary     @ 07:01  -   @ 07:00  --------------------------------------------------------  IN: 100 mL / OUT: 750 mL / NET: -650 mL      PHYSICAL EXAM  General:   CNS:   HEENT:   Resp:   CVS:   Abd:   Ext:   Skin:     MEDICATIONS  aztreonam  IVPB   aztreonam  IVPB IV Intermittent  rifAXIMin Oral          LORazepam   Injectable IV Push PRN  LORazepam   Injectable IV Push PRN  ondansetron Injectable IV Push PRN        famotidine    Tablet Oral  lactulose Syrup Oral      ergocalciferol Oral  folic acid Oral  magnesium oxide Oral  multivitamin Oral  phytonadione   Solution Oral  thiamine Oral                                7.7    10.05 )-----------( 64       ( 2020 05:17 )             24.1           137  |  102  |  16  ----------------------------<  96  3.9   |  27  |  1.20    Ca    8.4<L>      2020 05:17  Phos  2.5       Mg     2.2         TPro  6.3  /  Alb  1.9<L>  /  TBili  24.0<HH>  /  DBili  x   /  AST  107<H>  /  ALT  44  /  AlkPhos  103  -          PT/INR - ( 2020 05:17 )   PT: 40.0 sec;   INR: 3.64 ratio         PTT - ( 2020 05:17 )  PTT:50.9 sec  Urinalysis Basic - ( 2020 17:16 )    Color: Claudine / Appearance: Slightly Turbid / S.010 / pH: x  Gluc: x / Ketone: Negative  / Bili: Large / Urobili: 12   Blood: x / Protein: Negative / Nitrite: Positive   Leuk Esterase: Trace / RBC: 0-2 /HPF / WBC 3-5   Sq Epi: x / Non Sq Epi: Occasional / Bacteria: Few      .Body Fluid Peritoneal Fluid --   polymorphonuclear leukocytes seen  No organisms seen  by cytocentrifuge  @ 00:56        Radiology: ***  Bedside lung ultrasound: ***  Bedside ECHO: ***    CENTRAL LINE: Y/N          DATE INSERTED:              REMOVE: Y/N  DENNY: Y/N                        DATE INSERTED:              REMOVE: Y/N  A-LINE: Y/N                       DATE INSERTED:              REMOVE: Y/N    GLOBAL ISSUE/BEST PRACTICE  Analgesia:   Sedation:   CAM-ICU:   HOB elevation: yes  Stress ulcer prophylaxis:   VTE prophylaxis:   Glycemic control:   Nutrition:     CODE STATUS: ***  GOC discussion: Y Patient is a 50y old  Male who presents with a chief complaint of alcohol withdrawl, elevated bilirubin (2020 07:31)    50 year old man with Hx of EtOH cirrhosis, complicated by ascites, EV presenting with EtOH withdrawal and EtOH hepatitis with liver failure.      50y M with a PMHx of alcohol dependence, liver cirrhosis, esophageal varices s/p banding in , ascites s/p paracentesis 1 year ago, IBS, who comes in for detox and jaundice x 1 month. He states he relapsed in April after successfully quitting for 6 months previously. He has been admitted for detox multiple times in the past, in  he was admitted at [facility] and 2017 at [facility]. He denies seizures in the past from withdrawal however he does endorse shaking, N, V when he doesnt drinks for 2-3 days. Pt states he drinks 1 quart of vodka daily and his last drink was yesterday around 3 PM. He follows with GI Dr. Drew at [facility] regularly for his cirrhosis. He noticed he was getting jaundiced about 1 month ago. He states the last time this happened his T bili was also elevated just like today. He also endorses falling 1 week ago onto his right hip. He has been able to walk on it but has pain when standing for a long time. Denies shooting pain into his leg, numbness tingling. He endorses chills, difficulty with balance, N, V of clear liquid when he doesn't drink for 2-3 days. He denies current fevers, chills, neck pain, visual changes, CP, SOB, cough, palpitations, abdominal pain, diarrhea, dysuria. He endorses chronic balance difficulties, abdominal distention without pain, yellowing of his skin and eyes, darkening of his urine, right hip pain.      24 hour events: CIWA 4-16 in past 24 hrs, CIWA 4 in the AM, had PRN ativan 2mg x1 overnight.   Paracentesis yesterday with NGTD and started on aztreonam yesterday.        REVIEW OF SYSTEMS  Constitutional: No fever, chills  Neuro: No headache or weakness  Resp: No cough, wheezing, shortness of breath  CVS: No chest pain, palpitations  GI: No abdominal pain, nausea, vomiting   Skin: No itching, burning   Msk: No joint pain  Heme: No bleeding    T(F): 98 (07-12-20 @ 23:36), Max: 98.6 (20 @ 15:57)  HR: 59 (20 @ 07:00) (54 - 100)  BP: 99/56 (20 @ 07:00) (82/59 - 129/97)  RR: 12 (20 @ 07:00) (9 - 26)  SpO2: 98% (20 @ 07:00) (89% - 99%)  Wt(kg): --            I&O's Summary     @ 07:01  -   @ 07:00  --------------------------------------------------------  IN: 100 mL / OUT: 750 mL / NET: -650 mL      PHYSICAL EXAM  General: NAD  CNS: AOx3  HEENT: PERRLA   Resp: Distant breath sounds b/l  CVS: RRR, S1, S2, no murmurs, or gallops  Abd: NT, BS+, slightly distended  Ext: right leg swelling with associated erythema   Skin: Jaundiced, ecchymosis noted on left LE and right upper chest     MEDICATIONS  aztreonam  IVPB   aztreonam  IVPB IV Intermittent  rifAXIMin Oral          LORazepam   Injectable IV Push PRN  LORazepam   Injectable IV Push PRN  ondansetron Injectable IV Push PRN        famotidine    Tablet Oral  lactulose Syrup Oral      ergocalciferol Oral  folic acid Oral  magnesium oxide Oral  multivitamin Oral  phytonadione   Solution Oral  thiamine Oral                                7.7    10.05 )-----------( 64       ( 2020 05:17 )             24.1       07-13    137  |  102  |  16  ----------------------------<  96  3.9   |  27  |  1.20    Ca    8.4<L>      2020 05:17  Phos  2.5     07-  Mg     2.2     07-    TPro  6.3  /  Alb  1.9<L>  /  TBili  24.0<HH>  /  DBili  x   /  AST  107<H>  /  ALT  44  /  AlkPhos  103  07-13          PT/INR - ( 2020 05:17 )   PT: 40.0 sec;   INR: 3.64 ratio         PTT - ( 2020 05:17 )  PTT:50.9 sec  Urinalysis Basic - ( 2020 17:16 )    Color: Claudine / Appearance: Slightly Turbid / S.010 / pH: x  Gluc: x / Ketone: Negative  / Bili: Large / Urobili: 12   Blood: x / Protein: Negative / Nitrite: Positive   Leuk Esterase: Trace / RBC: 0-2 /HPF / WBC 3-5   Sq Epi: x / Non Sq Epi: Occasional / Bacteria: Few      .Body Fluid Peritoneal Fluid --   polymorphonuclear leukocytes seen  No organisms seen  by cytocentrifuge  @ 00:56        Radiology: Doppler of R LE: no DVT, simple rt popliteal cyst  Bedside lung ultrasound:   Bedside ECHO:     CENTRAL LI NE: N          DATE INSERTED:              REMOVE: Y/N  DENNY: N                        DATE INSERTED:              REMOVE: Y/N  A-LINE: N                       DATE INSERTED:              REMOVE: Y/N    GLOBAL ISSUE/BEST PRACTICE  Analgesia: N   Sedation: N  HOB elevation: yes  Stress ulcer prophylaxis: famotidine 20mg   VTE prophylaxis: Lovenox 40 daily  Glycemic control: N  Nutrition: DASH diet    CODE STATUS: full code

## 2020-07-13 NOTE — PROGRESS NOTE ADULT - SUBJECTIVE AND OBJECTIVE BOX
INTERVAL HPI/OVERNIGHT EVENTS:  pt seen and examined earlier this am  non cooperative w ros  per rn pt agitated, 2 bms overnight, non bloody  sp dx para yesterday    MEDICATIONS  (STANDING):  aztreonam  IVPB      aztreonam  IVPB 1000 milliGRAM(s) IV Intermittent every 8 hours  enoxaparin Injectable 40 milliGRAM(s) SubCutaneous daily  ergocalciferol 11322 Unit(s) Oral every week  famotidine    Tablet 20 milliGRAM(s) Oral two times a day  folic acid 1 milliGRAM(s) Oral daily  lactulose Syrup 20 Gram(s) Oral four times a day  magnesium oxide 400 milliGRAM(s) Oral daily  multivitamin 1 Tablet(s) Oral daily  phytonadione   Solution 5 milliGRAM(s) Oral daily  prednisoLONE    3 mG/mL Solution (ORAPRED) 40 milliGRAM(s) Oral daily  propranolol 40 milliGRAM(s) Oral every 8 hours  rifAXIMin 550 milliGRAM(s) Oral two times a day  thiamine 100 milliGRAM(s) Oral daily    MEDICATIONS  (PRN):  LORazepam   Injectable 2 milliGRAM(s) IV Push every 2 hours PRN Symptom-triggered: 2 point increase in CIWA -Ar score and a total score of 7 or LESS  LORazepam   Injectable 2 milliGRAM(s) IV Push every 1 hour PRN Symptom-triggered: each CIWA -Ar score 8 or GREATER  ondansetron Injectable 4 milliGRAM(s) IV Push every 6 hours PRN Nausea      Allergies    penicillin (Unknown)    Intolerances        Review of Systems:    unable to obtain       Vital Signs Last 24 Hrs  T(C): 36.7 (2020 23:36), Max: 37 (2020 15:57)  T(F): 98 (2020 23:36), Max: 98.6 (2020 15:57)  HR: 68 (2020 11:00) (58 - 100)  BP: 108/56 (2020 11:00) (88/51 - 129/97)  BP(mean): 74 (2020 11:00) (63 - 100)  RR: 11 (2020 11:00) (11 - 26)  SpO2: 93% (2020 10:00) (89% - 99%)    PHYSICAL EXAM:    Constitutional: lying in bed  HEENT: ncat  Gastrointestinal: soft appears nt +dt  Extremities: No peripheral edema  Vascular: + peripheral pulses  Neurological: lethargic but arousable  Skin: jaundice      LABS:                        7.7    10.05 )-----------( 64       ( 2020 05:17 )             24.1     07-13    137  |  102  |  16  ----------------------------<  96  3.9   |  27  |  1.20    Ca    8.4<L>      2020 05:17  Phos  2.5     07-  Mg     2.2     07-13    TPro  6.3  /  Alb  1.9<L>  /  TBili  24.0<HH>  /  DBili  x   /  AST  107<H>  /  ALT  44  /  AlkPhos  103  07-13    PT/INR - ( 2020 05:17 )   PT: 40.0 sec;   INR: 3.64 ratio         PTT - ( 2020 05:17 )  PTT:50.9 sec  Urinalysis Basic - ( 2020 17:16 )    Color: Claudine / Appearance: Slightly Turbid / S.010 / pH: x  Gluc: x / Ketone: Negative  / Bili: Large / Urobili: 12   Blood: x / Protein: Negative / Nitrite: Positive   Leuk Esterase: Trace / RBC: 0-2 /HPF / WBC 3-5   Sq Epi: x / Non Sq Epi: Occasional / Bacteria: Few        RADIOLOGY & ADDITIONAL TESTS:

## 2020-07-14 LAB
ALBUMIN SERPL ELPH-MCNC: 1.9 G/DL — LOW (ref 3.3–5)
ALP SERPL-CCNC: 101 U/L — SIGNIFICANT CHANGE UP (ref 40–120)
ALT FLD-CCNC: 48 U/L — SIGNIFICANT CHANGE UP (ref 12–78)
ANION GAP SERPL CALC-SCNC: 7 MMOL/L — SIGNIFICANT CHANGE UP (ref 5–17)
AST SERPL-CCNC: 98 U/L — HIGH (ref 15–37)
BASOPHILS # BLD AUTO: 0.01 K/UL — SIGNIFICANT CHANGE UP (ref 0–0.2)
BASOPHILS NFR BLD AUTO: 0.1 % — SIGNIFICANT CHANGE UP (ref 0–2)
BILIRUB SERPL-MCNC: 24.1 MG/DL — CRITICAL HIGH (ref 0.2–1.2)
BUN SERPL-MCNC: 16 MG/DL — SIGNIFICANT CHANGE UP (ref 7–23)
CALCIUM SERPL-MCNC: 8.4 MG/DL — LOW (ref 8.5–10.1)
CHLORIDE SERPL-SCNC: 102 MMOL/L — SIGNIFICANT CHANGE UP (ref 96–108)
CO2 SERPL-SCNC: 28 MMOL/L — SIGNIFICANT CHANGE UP (ref 22–31)
CREAT SERPL-MCNC: 1.1 MG/DL — SIGNIFICANT CHANGE UP (ref 0.5–1.3)
EOSINOPHIL # BLD AUTO: 0 K/UL — SIGNIFICANT CHANGE UP (ref 0–0.5)
EOSINOPHIL NFR BLD AUTO: 0 % — SIGNIFICANT CHANGE UP (ref 0–6)
GLUCOSE SERPL-MCNC: 112 MG/DL — HIGH (ref 70–99)
HCT VFR BLD CALC: 24.3 % — LOW (ref 39–50)
HGB BLD-MCNC: 7.7 G/DL — LOW (ref 13–17)
IMM GRANULOCYTES NFR BLD AUTO: 3.5 % — HIGH (ref 0–1.5)
LYMPHOCYTES # BLD AUTO: 0.96 K/UL — LOW (ref 1–3.3)
LYMPHOCYTES # BLD AUTO: 10 % — LOW (ref 13–44)
MAGNESIUM SERPL-MCNC: 2.2 MG/DL — SIGNIFICANT CHANGE UP (ref 1.6–2.6)
MCHC RBC-ENTMCNC: 31.7 GM/DL — LOW (ref 32–36)
MCHC RBC-ENTMCNC: 39.7 PG — HIGH (ref 27–34)
MCV RBC AUTO: 125.3 FL — HIGH (ref 80–100)
MONOCYTES # BLD AUTO: 1.88 K/UL — HIGH (ref 0–0.9)
MONOCYTES NFR BLD AUTO: 19.6 % — HIGH (ref 2–14)
NEUTROPHILS # BLD AUTO: 6.41 K/UL — SIGNIFICANT CHANGE UP (ref 1.8–7.4)
NEUTROPHILS NFR BLD AUTO: 66.8 % — SIGNIFICANT CHANGE UP (ref 43–77)
NRBC # BLD: 0 /100 WBCS — SIGNIFICANT CHANGE UP (ref 0–0)
PHOSPHATE SERPL-MCNC: 4.3 MG/DL — SIGNIFICANT CHANGE UP (ref 2.5–4.5)
PLATELET # BLD AUTO: 72 K/UL — LOW (ref 150–400)
POTASSIUM SERPL-MCNC: 4.1 MMOL/L — SIGNIFICANT CHANGE UP (ref 3.5–5.3)
POTASSIUM SERPL-SCNC: 4.1 MMOL/L — SIGNIFICANT CHANGE UP (ref 3.5–5.3)
PROT SERPL-MCNC: 6.4 G/DL — SIGNIFICANT CHANGE UP (ref 6–8.3)
RBC # BLD: 1.94 M/UL — LOW (ref 4.2–5.8)
RBC # FLD: 18.5 % — HIGH (ref 10.3–14.5)
SODIUM SERPL-SCNC: 137 MMOL/L — SIGNIFICANT CHANGE UP (ref 135–145)
WBC # BLD: 9.6 K/UL — SIGNIFICANT CHANGE UP (ref 3.8–10.5)
WBC # FLD AUTO: 9.6 K/UL — SIGNIFICANT CHANGE UP (ref 3.8–10.5)

## 2020-07-14 PROCEDURE — 99233 SBSQ HOSP IP/OBS HIGH 50: CPT | Mod: GC

## 2020-07-14 PROCEDURE — 99233 SBSQ HOSP IP/OBS HIGH 50: CPT

## 2020-07-14 RX ORDER — PREDNISOLONE 5 MG
20 TABLET ORAL DAILY
Refills: 0 | Status: CANCELLED | OUTPATIENT
Start: 2020-08-15 | End: 2020-07-17

## 2020-07-14 RX ORDER — PREDNISOLONE 5 MG
10 TABLET ORAL DAILY
Refills: 0 | Status: CANCELLED | OUTPATIENT
Start: 2020-08-22 | End: 2020-07-17

## 2020-07-14 RX ORDER — SODIUM CHLORIDE 9 MG/ML
500 INJECTION, SOLUTION INTRAVENOUS
Refills: 0 | Status: DISCONTINUED | OUTPATIENT
Start: 2020-07-14 | End: 2020-07-15

## 2020-07-14 RX ORDER — SODIUM CHLORIDE 9 MG/ML
500 INJECTION, SOLUTION INTRAVENOUS ONCE
Refills: 0 | Status: COMPLETED | OUTPATIENT
Start: 2020-07-14 | End: 2020-07-14

## 2020-07-14 RX ORDER — PREDNISOLONE 5 MG
30 TABLET ORAL DAILY
Refills: 0 | Status: CANCELLED | OUTPATIENT
Start: 2020-08-08 | End: 2020-07-17

## 2020-07-14 RX ADMIN — LACTULOSE 20 GRAM(S): 10 SOLUTION ORAL at 06:20

## 2020-07-14 RX ADMIN — Medication 50 MILLIGRAM(S): at 06:21

## 2020-07-14 RX ADMIN — SODIUM CHLORIDE 2000 MILLILITER(S): 9 INJECTION, SOLUTION INTRAVENOUS at 08:58

## 2020-07-14 RX ADMIN — Medication 5 MILLIGRAM(S): at 12:06

## 2020-07-14 RX ADMIN — LACTULOSE 20 GRAM(S): 10 SOLUTION ORAL at 17:29

## 2020-07-14 RX ADMIN — ENOXAPARIN SODIUM 40 MILLIGRAM(S): 100 INJECTION SUBCUTANEOUS at 12:06

## 2020-07-14 RX ADMIN — LACTULOSE 20 GRAM(S): 10 SOLUTION ORAL at 12:07

## 2020-07-14 RX ADMIN — Medication 40 MILLIGRAM(S): at 06:20

## 2020-07-14 RX ADMIN — SODIUM CHLORIDE 100 MILLILITER(S): 9 INJECTION, SOLUTION INTRAVENOUS at 09:15

## 2020-07-14 NOTE — PROGRESS NOTE ADULT - PROBLEM SELECTOR PLAN 3
-Aliaey's DF: 153; MELD score of 35 based on labs from 7/13 (no coags today to calculate)  -c/w prednisolone 40mg po daily for acute alcoholic hepatitis  -GI consulted ([facility]), recs appreciated  -monitor PT/INR, t-bilirubin

## 2020-07-14 NOTE — PROGRESS NOTE ADULT - PROBLEM SELECTOR PROBLEM 7
Esophageal varices with bleeding

## 2020-07-14 NOTE — PROGRESS NOTE ADULT - ASSESSMENT
INCOMPLETE  50 year old man with Hx of EtOH cirrhosis, complicated by ascites, EV presenting with EtOH withdrawal and EtOH hepatitis with liver failure.     NEURO: EtOH withdrawal, came in agitated, CIWA 4-16 w PRN Ativan (x1 overnight), off librium, continue folate, multivitamin, and thiamine   CV: Hypotensive, d/c- amlodipine and spironolactone for now.        - Started Propranolol 40 mg q8 (on Nadolol 40mg at home- CHECK DOSE w KARTIK        - Give albumin if hypotension persists        - Right leg swelling, doppler 7/13 - no DVT, simple rt popliteal cyst   PULM: no acute issues  GI: Suspected bacterial peritonitis: paracentesis done- pending cultures, stared on aztreonam.            - EtOH hepatitis with liver failure- continue on prednisolone, f/u hepatic panel, LFTs trending down, Tbilli elevated at 24, rifaximin and lactulose, monitor for BMs    - RENAL: monitor electrolytes and replete as needed  - ID: aztreonam- r/o bacterial peritonitis, pending culture   - HEME: Daily coags, no evidence of bleeding.   - SKIN: Monitor ecchymosis on left leg and right chest. 50 year old man with Hx of EtOH cirrhosis, complicated by ascites, EV presenting with EtOH withdrawal and EtOH hepatitis with liver failure.     NEURO: EtOH withdrawal, came in agitated, CIWA 4-16 w PRN Ativan (x1 overnight), off librium, continue folate, multivitamin, and thiamine   CV: Hypotensive, d/c- amlodipine and spironolactone for now.        - Changed from propranolol 40mg q8 to Propranolol 20 mg q8 due to hypotension (on Nadolol 40mg at home)        - Give albumin if hypotension persists        - Right leg swelling, doppler 7/13 - no DVT, simple rt popliteal cyst   PULM: no acute issues  GI: Suspected bacterial peritonitis: paracentesis done- negative cultures, d/c aztreonam.            - EtOH hepatitis with liver failure- continue on prednisolone. After completing 26 days of prednisolone patient must be placed on a taper: 30mg for 1 week, 20mg for 1 week, and 10 mg for 1 week.           - f/u hepatic panel, LFTs trending down, Tbilli elevated at 24, rifaximin and lactulose, monitor for BMs    - RENAL: monitor electrolytes and replete as needed  - ID: d/c aztreonam- negative peritoneal culture   - HEME: Daily coags, no evidence of bleeding.   - SKIN: Monitor ecchymosis on left leg and right chest.

## 2020-07-14 NOTE — PROGRESS NOTE ADULT - PROBLEM SELECTOR PLAN 1
-acute on chronic liver failure   -pt with h/o cirrhosis, esophageal varices s/p banding in 2017, hx of ascites s/p therapeutic paracentesis ~1 year ago, 2/2 chronic alcoholism  -Maddrey's DF: 153; MELD score of 35 based on labs from 7/13 (no coags today to calculate)  -c/w prednisolone 40mg po daily for acute alcoholic hepatitis  -ammonia level which was elevated to 142 -- normalized  -c/w rifaximin and lactulose (consider decreasing dose soon)  -vitamin K PO ordered  -monitor PT/INR, LFTs, ammonia, CBC  -GI consulted ([facility]), recs appreciated

## 2020-07-14 NOTE — PROGRESS NOTE ADULT - SUBJECTIVE AND OBJECTIVE BOX
Date/Time Patient Seen:  		  Referring MD:   Data Reviewed	       Patient is a 50y old  Male who presents with a chief complaint of alcohol withdrawl, elevated bilirubin (2020 07:53)      Subjective/HPI     PAST MEDICAL & SURGICAL HISTORY:  IBS (irritable bowel syndrome)  HTN (hypertension)  Ascites due to alcoholic cirrhosis  Cirrhosis of liver  ETOH abuse  Esophageal varices with bleedin2017  History of ankle surgery        Medication list         MEDICATIONS  (STANDING):  aztreonam  IVPB      aztreonam  IVPB 1000 milliGRAM(s) IV Intermittent every 8 hours  enoxaparin Injectable 40 milliGRAM(s) SubCutaneous daily  ergocalciferol 35685 Unit(s) Oral every week  famotidine    Tablet 20 milliGRAM(s) Oral two times a day  folic acid 1 milliGRAM(s) Oral daily  lactulose Syrup 20 Gram(s) Oral four times a day  magnesium oxide 400 milliGRAM(s) Oral daily  multivitamin 1 Tablet(s) Oral daily  phytonadione   Solution 5 milliGRAM(s) Oral daily  prednisoLONE    3 mG/mL Solution (ORAPRED) 40 milliGRAM(s) Oral daily  propranolol 40 milliGRAM(s) Oral every 8 hours  rifAXIMin 550 milliGRAM(s) Oral two times a day  thiamine 100 milliGRAM(s) Oral daily    MEDICATIONS  (PRN):  LORazepam   Injectable 2 milliGRAM(s) IV Push every 2 hours PRN Symptom-triggered: 2 point increase in CIWA -Ar score and a total score of 7 or LESS  LORazepam   Injectable 2 milliGRAM(s) IV Push every 1 hour PRN Symptom-triggered: each CIWA -Ar score 8 or GREATER  ondansetron Injectable 4 milliGRAM(s) IV Push every 6 hours PRN Nausea         Vitals log        ICU Vital Signs Last 24 Hrs  T(C): 36.4 (2020 03:35), Max: 36.8 (2020 19:57)  T(F): 97.5 (2020 03:35), Max: 98.3 (2020 19:57)  HR: 55 (2020 08:00) (52 - 83)  BP: 82/50 (2020 08:00) (82/50 - 159/84)  BP(mean): 62 (2020 08:00) (62 - 114)  ABP: --  ABP(mean): --  RR: 14 (2020 08:00) (10 - 33)  SpO2: 96% (2020 08:00) (91% - 100%)           Input and Output:  I&O's Detail    2020 07:01  -  2020 07:00  --------------------------------------------------------  IN:    Oral Fluid: 120 mL    Solution: 150 mL  Total IN: 270 mL    OUT:    Voided: 150 mL  Total OUT: 150 mL    Total NET: 120 mL          Lab Data                        7.7    9.60  )-----------( 72       ( 2020 05:18 )             24.3     07-14    137  |  102  |  16  ----------------------------<  112<H>  4.1   |  28  |  1.10    Ca    8.4<L>      2020 05:18  Phos  4.3     07-14  Mg     2.2     07-14    TPro  6.4  /  Alb  1.9<L>  /  TBili  24.1<HH>  /  DBili  x   /  AST  98<H>  /  ALT  48  /  AlkPhos  101  07-14            Review of Systems	      Objective     Physical Examination    heart s1s2  lung dec BS  head nc      Pertinent Lab findings & Imaging      Annel:  NO   Adequate UO     I&O's Detail    2020 07:01  -  2020 07:00  --------------------------------------------------------  IN:    Oral Fluid: 120 mL    Solution: 150 mL  Total IN: 270 mL    OUT:    Voided: 150 mL  Total OUT: 150 mL    Total NET: 120 mL               Discussed with:     Cultures:	        Radiology

## 2020-07-14 NOTE — PROGRESS NOTE ADULT - PROBLEM SELECTOR PLAN 2
-alcohol abuse with dependence chronic ETOH abuse >20 years  -drinks 1 quart vodka daily  -rapidly deteriorated to DTs on 7/11/20 and transferred to the ICU  -addiction medicine consult (Leatha), recs appreciated  -in setting of liver failure, c/w ativan PRN for symptom-triggered CIWA  -f/up CIWA protocol with close monitoring in ICU

## 2020-07-14 NOTE — PROGRESS NOTE ADULT - PROBLEM SELECTOR PLAN 7
-s/p bleeding esophageal varices banding in 2017 -- no sign of bleeding at this time  -started on propranolol as BP is more stable  -PT/INR elevated, trend platelets  -vitamin K ordered  -pt currently stable and comfortable with no active nausea, vomiting or other sign of GIB  -monitor for new onset hemoptysis or HD instability

## 2020-07-14 NOTE — PROGRESS NOTE ADULT - PROBLEM SELECTOR PLAN 1
ESLD  cirrhosis  ascites - paracentesis done - on emp ABX for poss SBP -   on ativan PRN - ciwa - encephalopathy -   poss transfer to [facility] - hepatology consultation  pancytopenia  prognosis poor  counseling - education - vitamins - Smoker and Active Drinker -  ESLD with high Meld Score and DF  on Steroids for ETOH Hepatitis

## 2020-07-14 NOTE — PROGRESS NOTE ADULT - PROBLEM SELECTOR PLAN 6
-pt has h/o of paracentesis ~1 year ago with 2 L removed, 2/2 cirrhosis from chronic ETOH abuse  -no current abdominal discomfort or SOB or fever making SBP less likely  -had diagnostic paracentesis on 7/12-- ascites studies not indicative of SBP  -now off empirical azactam   -BCx are negative  -spironolactone and lasix on hold for now as pt's BP is relatively low

## 2020-07-14 NOTE — PROGRESS NOTE ADULT - SUBJECTIVE AND OBJECTIVE BOX
Patient is a 50y old  Male who presents with a chief complaint of alcohol withdrawal, jaundice.      INTERVAL HPI/OVERNIGHT EVENTS: Pt lethargic after ativan, answering few questions after being shaken awake for brief moments. Denies fever, abd pain, SOB, CP.    MEDICATIONS  (STANDING):  aztreonam  IVPB      aztreonam  IVPB 1000 milliGRAM(s) IV Intermittent every 8 hours  enoxaparin Injectable 40 milliGRAM(s) SubCutaneous daily  ergocalciferol 91650 Unit(s) Oral every week  famotidine    Tablet 20 milliGRAM(s) Oral two times a day  folic acid 1 milliGRAM(s) Oral daily  lactated ringers. 500 milliLiter(s) (100 mL/Hr) IV Continuous <Continuous>  lactulose Syrup 20 Gram(s) Oral four times a day  magnesium oxide 400 milliGRAM(s) Oral daily  multivitamin 1 Tablet(s) Oral daily  phytonadione   Solution 5 milliGRAM(s) Oral daily  prednisoLONE    3 mG/mL Solution (ORAPRED) 40 milliGRAM(s) Oral daily  propranolol 40 milliGRAM(s) Oral every 8 hours  rifAXIMin 550 milliGRAM(s) Oral two times a day  thiamine 100 milliGRAM(s) Oral daily    MEDICATIONS  (PRN):  LORazepam   Injectable 2 milliGRAM(s) IV Push every 2 hours PRN Symptom-triggered: 2 point increase in CIWA -Ar score and a total score of 7 or LESS  LORazepam   Injectable 2 milliGRAM(s) IV Push every 1 hour PRN Symptom-triggered: each CIWA -Ar score 8 or GREATER  ondansetron Injectable 4 milliGRAM(s) IV Push every 6 hours PRN Nausea      Allergies    penicillin (Unknown)    Intolerances        REVIEW OF SYSTEMS:  Cannot obtain reliable ROS due to lethargy: answering few questions after being shaken awake for brief moments. Denies fever, abd pain, SOB, CP.    Vital Signs Last 24 Hrs  T(C): 36.3 (14 Jul 2020 08:00), Max: 36.8 (13 Jul 2020 19:57)  T(F): 97.4 (14 Jul 2020 08:00), Max: 98.3 (13 Jul 2020 19:57)  HR: 50 (14 Jul 2020 10:00) (50 - 83)  BP: 120/58 (14 Jul 2020 10:00) (77/47 - 159/84)  BP(mean): 77 (14 Jul 2020 10:00) (57 - 114)  RR: 14 (14 Jul 2020 10:00) (10 - 33)  SpO2: 100% (14 Jul 2020 10:00) (91% - 100%)    PHYSICAL EXAM:  GENERAL: lethargic, ++jaundiced  HEENT:  +icteric, moist mucous membranes  CHEST/LUNG:  CTA b/l, no rales, wheezes, or rhonchi  HEART:  RRR, S1, S2  ABDOMEN:  BS+, soft, no apparent tenderness, +distended; +ascites  EXTREMITIES: no cyanosis or apparent calf tenderness  NERVOUS SYSTEM: lethargic, arousable for brief period with shaking    LABS:                        7.7    9.60  )-----------( 72       ( 14 Jul 2020 05:18 )             24.3     CBC Full  -  ( 14 Jul 2020 05:18 )  WBC Count : 9.60 K/uL  Hemoglobin : 7.7 g/dL  Hematocrit : 24.3 %  Platelet Count - Automated : 72 K/uL  Mean Cell Volume : 125.3 fl  Mean Cell Hemoglobin : 39.7 pg  Mean Cell Hemoglobin Concentration : 31.7 gm/dL  Auto Neutrophil # : 6.41 K/uL  Auto Lymphocyte # : 0.96 K/uL  Auto Monocyte # : 1.88 K/uL  Auto Eosinophil # : 0.00 K/uL  Auto Basophil # : 0.01 K/uL  Auto Neutrophil % : 66.8 %  Auto Lymphocyte % : 10.0 %  Auto Monocyte % : 19.6 %  Auto Eosinophil % : 0.0 %  Auto Basophil % : 0.1 %    14 Jul 2020 05:18    137    |  102    |  16     ----------------------------<  112    4.1     |  28     |  1.10     Ca    8.4        14 Jul 2020 05:18  Phos  4.3       14 Jul 2020 05:18  Mg     2.2       14 Jul 2020 05:18    TPro  6.4    /  Alb  1.9    /  TBili  24.1   /  DBili  x      /  AST  98     /  ALT  48     /  AlkPhos  101    14 Jul 2020 05:18    PT/INR - ( 13 Jul 2020 05:17 )   PT: 40.0 sec;   INR: 3.64 ratio         PTT - ( 13 Jul 2020 05:17 )  PTT:50.9 sec    CAPILLARY BLOOD GLUCOSE            Culture - Fungal, Body Fluid (collected 07-13-20 @ 00:56)  Source: .Body Fluid Peritoneal Fluid  Preliminary Report (07-13-20 @ 08:34):    Testing in progress    Culture - Body Fluid with Gram Stain (collected 07-13-20 @ 00:56)  Source: .Body Fluid Peritoneal Fluid  Gram Stain (07-13-20 @ 03:58):    polymorphonuclear leukocytes seen    No organisms seen    by cytocentrifuge  Preliminary Report (07-13-20 @ 21:39):    No growth    Culture - Urine (collected 07-12-20 @ 20:23)  Source: .Urine Clean Catch (Midstream)  Final Report (07-13-20 @ 17:18):    <10,000 CFU/mL Normal Urogenital Aletha    Culture - Blood (collected 07-12-20 @ 18:13)  Source: .Blood Blood  Preliminary Report (07-13-20 @ 19:02):    No growth to date.    Culture - Blood (collected 07-12-20 @ 18:13)  Source: .Blood Blood  Preliminary Report (07-13-20 @ 19:02):    No growth to date.        RADIOLOGY & ADDITIONAL TESTS:    Personally reviewed.     Consultant(s) Notes Reviewed:  [x] YES  [ ] NO

## 2020-07-14 NOTE — PROGRESS NOTE ADULT - ASSESSMENT
Assessment and Recommendation:    Problem/Recommendation - 1:  Problem: Alcoholic hepatitis with ascites  US reviewed, cw cirrhosis, ascites, and gs wo evidence of acute porfirio  sp dx tap; saag cw phtn, no sbp by cell count; cx ngtd and cyto pending  cont prednisolone for high df   cont ppx abx pending final cxs  cont rifaximin and lactulose- titrate for 3 soft bms/day  cont pepcid, vit k x 3 days, propanolol w parameters  avoid hepatotoxins; monitor renal fxn; daily meld labs   low na/pro diet as tolerated when awake/alert  monitor gi function  pls check coags in am  cont icu care     Problem/Recommendation - 2:  ·  Problem: Liver failure, acute.  Recommendation: as above  decompensated cirrhosis  low threshold to transfer to [facility]     Problem/Recommendation - 3:  ·  Problem: Esophageal varices with bleeding.  Recommendation:   no s/s active gib  continue BB     Problem/Recommendation - 4:  ·  Problem: ETOH abuse.  Recommendation: CIWA protocol        plan dw attg and agreeable

## 2020-07-14 NOTE — PROGRESS NOTE ADULT - ASSESSMENT
51yo M with PMH of alcohol dependence, liver cirrhosis, hx of esophageal varices s/p banding in ~2017, ascites s/p paracentesis ~1 year ago, IBS, who p/w jaundice and alcohol withdrawal a/w decompensated liver failure with acute alcoholic hepatitis, course c/b delirium tremens, acute hepatic encephalopathy.

## 2020-07-14 NOTE — PROGRESS NOTE ADULT - ATTENDING COMMENTS
50M PMH EtOH cirrhosis with portal hypertension, ascites, and hepatic encephalopathy who presents with acute alcoholic withdrawal with acute alcoholic hepatitis and liver failure.     - Continue lorazepam prn and CIWA protocol. Required lorazepam 6 mg IV overnight and 2 mg this morning for agitation. Continue thiamine, MVI, and folic acid  - Continue rifaximin and lactulose for hepatic encephalopathy. Goal >2 BM's daily  - Continue prednisolone for acute alcoholic hepatitis, DF>32. Will need to complete 28 days followed by taper  - Decrease propranolol to 10 mg q6h for portal hypertension (with hold parameters). Hold furosemide and spironolactone for now given borderline BP, but will need to restart slowly. Monitor transaminases, bilirubin, PT/INR. Follows with hepatologist Dr. Drew ([phone])  - HD and respiratory status stable  - Continue DASH/TLC diet + Ensure  - Stable kidney function and lytes, strict I/O's  - Peritoneal and blood cultures negative, d/c aztreonam  - Continue vitamin K for coagulopathy. Continue enoxaparin for DVT ppx. LE dopplers negative for DVT. Monitor thrombocytopenia (72) due to EtOH + cirrhosis. Hold enoxaparin if PLT<50  - Macrocytic anemia due to EtOH + liver disease. Continue folic acid supplementation. B12 level normal  - Discussed with patient at bedside, full code

## 2020-07-14 NOTE — PROGRESS NOTE ADULT - SUBJECTIVE AND OBJECTIVE BOX
INCOMPLETE    Patient is a 50y old  Male who presents with a chief complaint of alcohol withdrawl, elevated bilirubin (2020 20:17)    24 hour events: ***    REVIEW OF SYSTEMS  Constitutional: No fever, chills, fatigue  Neuro: No headache, numbness, weakness  Resp: No cough, wheezing, shortness of breath  CVS: No chest pain, palpitations, leg swelling  GI: No abdominal pain, nausea, vomiting, diarrhea   : No dysuria, frequency, incontinence  Skin: No itching, burning, rashes, or lesions   Msk: No joint pain or swelling  Psych: No depression, anxiety, mood swings  Heme: No bleeding    T(F): 97.5 (20 @ 03:35), Max: 98.3 (20 @ 19:57)  HR: 59 (20 @ 07:00) (52 - 83)  BP: 104/59 (20 @ 07:00) (94/60 - 159/84)  RR: 15 (20 @ 07:00) (10 - 33)  SpO2: 97% (20 @ 07:00) (91% - 100%)  Wt(kg): --            I&O's Summary     @ 07:01  -   @ 07:00  --------------------------------------------------------  IN: 270 mL / OUT: 150 mL / NET: 120 mL      PHYSICAL EXAM  General:   CNS:   HEENT:   Resp:   CVS:   Abd:   Ext:   Skin:     MEDICATIONS  aztreonam  IVPB   aztreonam  IVPB IV Intermittent  rifAXIMin Oral    propranolol Oral    prednisoLONE    3 mG/mL Solution (ORAPRED) Oral      LORazepam   Injectable IV Push PRN  LORazepam   Injectable IV Push PRN  ondansetron Injectable IV Push PRN      enoxaparin Injectable SubCutaneous    famotidine    Tablet Oral  lactulose Syrup Oral      ergocalciferol Oral  folic acid Oral  magnesium oxide Oral  multivitamin Oral  phytonadione   Solution Oral  thiamine Oral                                7.7    9.60  )-----------( 72       ( 2020 05:18 )             24.3       07-14    137  |  102  |  16  ----------------------------<  112<H>  4.1   |  28  |  1.10    Ca    8.4<L>      2020 05:18  Phos  4.3       Mg     2.2         TPro  6.4  /  Alb  1.9<L>  /  TBili  24.1<HH>  /  DBili  x   /  AST  98<H>  /  ALT  48  /  AlkPhos  101            PT/INR - ( 2020 05:17 )   PT: 40.0 sec;   INR: 3.64 ratio         PTT - ( 2020 05:17 )  PTT:50.9 sec  Urinalysis Basic - ( 2020 17:16 )    Color: Claudine / Appearance: Slightly Turbid / S.010 / pH: x  Gluc: x / Ketone: Negative  / Bili: Large / Urobili: 12   Blood: x / Protein: Negative / Nitrite: Positive   Leuk Esterase: Trace / RBC: 0-2 /HPF / WBC 3-5   Sq Epi: x / Non Sq Epi: Occasional / Bacteria: Few      .Body Fluid Peritoneal Fluid   No growth   polymorphonuclear leukocytes seen  No organisms seen  by cytocentrifuge  @ 00:56  .Urine Clean Catch (Midstream)   <10,000 CFU/mL Normal Urogenital Aletha --  @ 20:23  .Blood Blood   No growth to date. --  @ 18:13        Radiology: ***  Bedside lung ultrasound: ***  Bedside ECHO: ***    CENTRAL LINE: Y/N          DATE INSERTED:              REMOVE: Y/N  DENNY: Y/N                        DATE INSERTED:              REMOVE: Y/N  A-LINE: Y/N                       DATE INSERTED:              REMOVE: Y/N    GLOBAL ISSUE/BEST PRACTICE  Analgesia:   Sedation:   CAM-ICU:   HOB elevation: yes  Stress ulcer prophylaxis:   VTE prophylaxis:   Glycemic control:   Nutrition:     CODE STATUS: ***  [facility] discussion: Y Patient is a 50y old  Male who presents with a chief complaint of alcohol withdrawl, elevated bilirubin (2020 20:17)    24 hour events: CIWA ranged from 4 to 20 yesterday. At 11pm CIWA was 20 and patient was trying to get out of bed. He was given Ativan 2mg x 3 and a soft vest restraint was put on him. In the AM that vest was removed and wrist restraints were added.       REVIEW OF SYSTEMS  Constitutional: No fever, chills  Neuro: No headache or weakness  Resp: No cough, wheezing, shortness of breath  CVS: No chest pain, palpitations  GI: No abdominal pain, nausea, vomiting   Skin: No itching, burning     T(F): 97.5 (20 @ 03:35), Max: 98.3 (20 @ 19:57)  HR: 59 (20 @ 07:00) (52 - 83)  BP: 104/59 (20 @ 07:00) (94/60 - 159/84)  RR: 15 (20 @ 07:00) (10 - 33)  SpO2: 97% (20 @ 07:00) (91% - 100%)  Wt(kg): --            I&O's Summary     @ 07:01  -   @ 07:00  --------------------------------------------------------  IN: 270 mL / OUT: 150 mL / NET: 120 mL      PHYSICAL EXAM  General: NAD  CNS: AOx3  HEENT: PERRLA   Resp: Distant breath sounds b/l  CVS: RRR, S1, S2, no murmurs, or gallops  Abd: NT, BS+, slightly distended  Ext: right leg swelling with associated erythema   Skin: Jaundiced, ecchymosis noted on left LE and right upper chest     MEDICATIONS  aztreonam  IVPB   aztreonam  IVPB IV Intermittent  rifAXIMin Oral    propranolol Oral    prednisoLONE    3 mG/mL Solution (ORAPRED) Oral      LORazepam   Injectable IV Push PRN  LORazepam   Injectable IV Push PRN  ondansetron Injectable IV Push PRN      enoxaparin Injectable SubCutaneous    famotidine    Tablet Oral  lactulose Syrup Oral      ergocalciferol Oral  folic acid Oral  magnesium oxide Oral  multivitamin Oral  phytonadione   Solution Oral  thiamine Oral                                7.7    9.60  )-----------( 72       ( 2020 05:18 )             24.3       07-14    137  |  102  |  16  ----------------------------<  112<H>  4.1   |  28  |  1.10    Ca    8.4<L>      2020 05:18  Phos  4.3     07-14  Mg     2.2     07-14    TPro  6.4  /  Alb  1.9<L>  /  TBili  24.1<HH>  /  DBili  x   /  AST  98<H>  /  ALT  48  /  AlkPhos  101  07-14          PT/INR - ( 2020 05:17 )   PT: 40.0 sec;   INR: 3.64 ratio         PTT - ( 2020 05:17 )  PTT:50.9 sec  Urinalysis Basic - ( 2020 17:16 )    Color: Claudine / Appearance: Slightly Turbid / S.010 / pH: x  Gluc: x / Ketone: Negative  / Bili: Large / Urobili: 12   Blood: x / Protein: Negative / Nitrite: Positive   Leuk Esterase: Trace / RBC: 0-2 /HPF / WBC 3-5   Sq Epi: x / Non Sq Epi: Occasional / Bacteria: Few      .Body Fluid Peritoneal Fluid   No growth   polymorphonuclear leukocytes seen  No organisms seen  by cytocentrifuge  @ 00:56  .Urine Clean Catch (Midstream)   <10,000 CFU/mL Normal Urogenital Aletha --  @ 20:23  .Blood Blood   No growth to date. --  @ 18:13        Radiology:   Bedside lung ultrasound:  Bedside ECHO:     CENTRAL LINE: N          DATE INSERTED:              REMOVE: Y/N  DENNY: N                        DATE INSERTED:              REMOVE: Y/N  A-LINE: N                       DATE INSERTED:              REMOVE: Y/N    GLOBAL ISSUE/BEST PRACTICE  Analgesia: N   Sedation: N  HOB elevation: yes  Stress ulcer prophylaxis: famotidine 20mg   VTE prophylaxis: Lovenox 40 daily  Glycemic control: N  Nutrition: DASH diet    CODE STATUS: full code

## 2020-07-15 DIAGNOSIS — K72.00 ACUTE AND SUBACUTE HEPATIC FAILURE WITHOUT COMA: ICD-10-CM

## 2020-07-15 LAB
ALBUMIN SERPL ELPH-MCNC: 1.8 G/DL — LOW (ref 3.3–5)
ALP SERPL-CCNC: 91 U/L — SIGNIFICANT CHANGE UP (ref 40–120)
ALT FLD-CCNC: 48 U/L — SIGNIFICANT CHANGE UP (ref 12–78)
ANION GAP SERPL CALC-SCNC: 5 MMOL/L — SIGNIFICANT CHANGE UP (ref 5–17)
AST SERPL-CCNC: 92 U/L — HIGH (ref 15–37)
BASOPHILS # BLD AUTO: 0.01 K/UL — SIGNIFICANT CHANGE UP (ref 0–0.2)
BASOPHILS NFR BLD AUTO: 0.1 % — SIGNIFICANT CHANGE UP (ref 0–2)
BILIRUB SERPL-MCNC: 23.8 MG/DL — CRITICAL HIGH (ref 0.2–1.2)
BUN SERPL-MCNC: 21 MG/DL — SIGNIFICANT CHANGE UP (ref 7–23)
CALCIUM SERPL-MCNC: 8.5 MG/DL — SIGNIFICANT CHANGE UP (ref 8.5–10.1)
CHLORIDE SERPL-SCNC: 103 MMOL/L — SIGNIFICANT CHANGE UP (ref 96–108)
CO2 SERPL-SCNC: 28 MMOL/L — SIGNIFICANT CHANGE UP (ref 22–31)
CREAT SERPL-MCNC: 1 MG/DL — SIGNIFICANT CHANGE UP (ref 0.5–1.3)
EOSINOPHIL # BLD AUTO: 0.02 K/UL — SIGNIFICANT CHANGE UP (ref 0–0.5)
EOSINOPHIL NFR BLD AUTO: 0.2 % — SIGNIFICANT CHANGE UP (ref 0–6)
GLUCOSE SERPL-MCNC: 81 MG/DL — SIGNIFICANT CHANGE UP (ref 70–99)
HCT VFR BLD CALC: 24.4 % — LOW (ref 39–50)
HGB BLD-MCNC: 8 G/DL — LOW (ref 13–17)
IMM GRANULOCYTES NFR BLD AUTO: 3 % — HIGH (ref 0–1.5)
INR BLD: 2.81 RATIO — HIGH (ref 0.88–1.16)
LYMPHOCYTES # BLD AUTO: 1.43 K/UL — SIGNIFICANT CHANGE UP (ref 1–3.3)
LYMPHOCYTES # BLD AUTO: 12.4 % — LOW (ref 13–44)
MAGNESIUM SERPL-MCNC: 2.3 MG/DL — SIGNIFICANT CHANGE UP (ref 1.6–2.6)
MCHC RBC-ENTMCNC: 32.8 GM/DL — SIGNIFICANT CHANGE UP (ref 32–36)
MCHC RBC-ENTMCNC: 40.6 PG — HIGH (ref 27–34)
MCV RBC AUTO: 123.9 FL — HIGH (ref 80–100)
MONOCYTES # BLD AUTO: 2.04 K/UL — HIGH (ref 0–0.9)
MONOCYTES NFR BLD AUTO: 17.8 % — HIGH (ref 2–14)
NEUTROPHILS # BLD AUTO: 7.64 K/UL — HIGH (ref 1.8–7.4)
NEUTROPHILS NFR BLD AUTO: 66.5 % — SIGNIFICANT CHANGE UP (ref 43–77)
NRBC # BLD: 0 /100 WBCS — SIGNIFICANT CHANGE UP (ref 0–0)
PHOSPHATE SERPL-MCNC: 4.7 MG/DL — HIGH (ref 2.5–4.5)
PLATELET # BLD AUTO: 70 K/UL — LOW (ref 150–400)
POTASSIUM SERPL-MCNC: 4.2 MMOL/L — SIGNIFICANT CHANGE UP (ref 3.5–5.3)
POTASSIUM SERPL-SCNC: 4.2 MMOL/L — SIGNIFICANT CHANGE UP (ref 3.5–5.3)
PROT SERPL-MCNC: 6.5 G/DL — SIGNIFICANT CHANGE UP (ref 6–8.3)
PROTHROM AB SERPL-ACNC: 31.3 SEC — HIGH (ref 10.6–13.6)
RBC # BLD: 1.97 M/UL — LOW (ref 4.2–5.8)
RBC # FLD: 18.6 % — HIGH (ref 10.3–14.5)
SODIUM SERPL-SCNC: 136 MMOL/L — SIGNIFICANT CHANGE UP (ref 135–145)
WBC # BLD: 11.49 K/UL — HIGH (ref 3.8–10.5)
WBC # FLD AUTO: 11.49 K/UL — HIGH (ref 3.8–10.5)

## 2020-07-15 PROCEDURE — 99233 SBSQ HOSP IP/OBS HIGH 50: CPT

## 2020-07-15 PROCEDURE — 99233 SBSQ HOSP IP/OBS HIGH 50: CPT | Mod: GC

## 2020-07-15 RX ORDER — FUROSEMIDE 40 MG
20 TABLET ORAL DAILY
Refills: 0 | Status: DISCONTINUED | OUTPATIENT
Start: 2020-07-15 | End: 2020-07-17

## 2020-07-15 RX ADMIN — LACTULOSE 20 GRAM(S): 10 SOLUTION ORAL at 05:37

## 2020-07-15 RX ADMIN — ENOXAPARIN SODIUM 40 MILLIGRAM(S): 100 INJECTION SUBCUTANEOUS at 11:54

## 2020-07-15 RX ADMIN — LACTULOSE 20 GRAM(S): 10 SOLUTION ORAL at 17:47

## 2020-07-15 RX ADMIN — Medication 20 MILLIGRAM(S): at 17:47

## 2020-07-15 RX ADMIN — LACTULOSE 20 GRAM(S): 10 SOLUTION ORAL at 11:54

## 2020-07-15 RX ADMIN — Medication 40 MILLIGRAM(S): at 06:33

## 2020-07-15 RX ADMIN — LACTULOSE 20 GRAM(S): 10 SOLUTION ORAL at 01:43

## 2020-07-15 NOTE — CHART NOTE - NSCHARTNOTEFT_GEN_A_CORE
Assessment:  Pt seen for nutrition follow up.  Chart reviewed, hospital course noted.  50 year old man with Hx of EtOH cirrhosis, complicated by ascites, EV presenting with EtOH withdrawal and EtOH hepatitis with liver failure.     Pt asleep, at present, bfst tray not yet eaten.  Per RN, pt is still not completely coherent.  Pt ate poorly yesterday due to lethargy/decreased cognition.   +fecal incontinence noted today.     Factors impacting intake: [ ] none [ ] nausea  [ ] vomiting [ ] diarrhea [ ] constipation  [ ]chewing problems [ ] swallowing issues  [x] other: decreased cognition/level of alertness    Diet Presciption: Diet, DASH/TLC:   Sodium & Cholesterol Restricted  No Carb Prosource (1pkg = 15gms Protein)     Qty per Day:  30cc po BID  Supplement Feeding Modality:  Oral  Ensure Enlive Cans or Servings Per Day:  1       Frequency:  Daily (07-12-20 @ 09:12)    Intake: poor    Current Weight: 7/10 184.5#, 7/14 210.1#    Pertinent Medications: MEDICATIONS  (STANDING):  enoxaparin Injectable 40 milliGRAM(s) SubCutaneous daily  ergocalciferol 05344 Unit(s) Oral every week  famotidine    Tablet 20 milliGRAM(s) Oral two times a day  folic acid 1 milliGRAM(s) Oral daily  lactulose Syrup 20 Gram(s) Oral four times a day  magnesium oxide 400 milliGRAM(s) Oral daily  multivitamin 1 Tablet(s) Oral daily  prednisoLONE    3 mG/mL Solution (ORAPRED) 40 milliGRAM(s) Oral daily  propranolol 10 milliGRAM(s) Oral every 6 hours  rifAXIMin 550 milliGRAM(s) Oral two times a day  thiamine 100 milliGRAM(s) Oral daily    MEDICATIONS  (PRN):  LORazepam   Injectable 2 milliGRAM(s) IV Push every 2 hours PRN Symptom-triggered: 2 point increase in CIWA -Ar score and a total score of 7 or LESS  LORazepam   Injectable 2 milliGRAM(s) IV Push every 1 hour PRN Symptom-triggered: each CIWA -Ar score 8 or GREATER  ondansetron Injectable 4 milliGRAM(s) IV Push every 6 hours PRN Nausea    Pertinent Labs: 07-15 Na136 mmol/L Glu 81 mg/dL K+ 4.2 mmol/L Cr  1.00 mg/dL BUN 21 mg/dL 07-15 Phos 4.7 mg/dL<H> 07-15 Alb 1.8 g/dL<L>      Skin: no pressure injuries  Edema: 1+ generalized    Estimated Needs:   [x] no change since previous assessment  [ ] recalculated:     Previous Nutrition Diagnosis:   [x] Excessive Alcohol Intake     Nutrition Diagnosis is [x] ongoing  [ ] resolved [ ] not applicable     New Nutrition Diagnosis: [x] not applicable       Interventions:   Recommend  [ ] Change Diet To:  [ ] Nutrition Supplement  [ ] Nutrition Support  [x] Other: Continue current diet, encourage po intake, monitor for s/s GI distress, bowel function, skin integrity.  Continue MVI, B1, folic acid.     Monitoring and Evaluation:   [ ] PO intake [ x ] Tolerance to diet prescription [ x ] weights [ x ] labs[ x ] follow up per protocol  [ ] other:

## 2020-07-15 NOTE — PROGRESS NOTE ADULT - ATTENDING COMMENTS
50M, etoh abuse/seizures, liver cirrhosis c/b esoph varices/banding, ascites, IBS; mgmt in ICU for decompensated liver failure, etoh w/dwl syndrome   -acute hepatitis/jaundice in setting of etoh abuse - on prednisolone - plan for 26days, then taper: 30mg x1wk, 20mg x 1wk, 10mg x 1wk; trending LFTs w/Tbili 20s   -decompensated hepatic cirrhosis w/liver failure w/AMS/hepatic encephalopathy, coagulopathy, AMS, anemia/thrombocytopenia - continues on propranolol as hemodynamics permit; s/p neg eval for SBP - abxs off; continues on lactulose, rifaximin for hepatic encephalopathy; trending CBC/INR, no ann marie e/o acute bleeding  -etoh w/dwl, etoh abuse - completed librium taper; continue vitamin/mineral supplementation; encourage cessation of etoh  -hypoTN in setting of liver failure, volume depletion - holding norvasc, spironolactone; intermittent doses of IV albumin prn  -dvt prophy - LE dopplers NEG for DVT

## 2020-07-15 NOTE — PROGRESS NOTE ADULT - ASSESSMENT
Assessment and Recommendation:    Problem/Recommendation - 1:  Problem: Alcoholic hepatitis with ascites  US reviewed, cw cirrhosis, ascites, and gs wo evidence of acute porfirio  sp dx tap; saag cw phtn, no sbp by cell count; cx ngtd and cyto pending  cont prednisolone for high df   cont ppx abx pending final cxs  cont rifaximin and lactulose- titrate for 3 soft bms/day  cont pepcid, vit k x 3 days, propanolol w parameters  avoid hepatotoxins; monitor renal fxn; daily meld labs   low na/pro diet as tolerated when awake/alert  monitor gi function  pls check coags in am       Problem/Recommendation - 2:  ·  Problem: Liver failure, acute.  Recommendation: as above  decompensated cirrhosis  low threshold to transfer to [facility]     Problem/Recommendation - 3:  ·  Problem: Esophageal varices with bleeding.  Recommendation:   no s/s active gib  continue BB     Problem/Recommendation - 4:  ·  Problem: ETOH abuse.  Recommendation: CIWA protocol

## 2020-07-15 NOTE — PROGRESS NOTE ADULT - SUBJECTIVE AND OBJECTIVE BOX
INTERVAL HPI/OVERNIGHT EVENTS:  pt seen and examined  interim events noted  hypotension improved  INR improved  tx to floors     MEDICATIONS  (STANDING):  aztreonam  IVPB      aztreonam  IVPB 1000 milliGRAM(s) IV Intermittent every 8 hours  enoxaparin Injectable 40 milliGRAM(s) SubCutaneous daily  ergocalciferol 94173 Unit(s) Oral every week  famotidine    Tablet 20 milliGRAM(s) Oral two times a day  folic acid 1 milliGRAM(s) Oral daily  lactated ringers. 500 milliLiter(s) (100 mL/Hr) IV Continuous <Continuous>  lactulose Syrup 20 Gram(s) Oral four times a day  magnesium oxide 400 milliGRAM(s) Oral daily  multivitamin 1 Tablet(s) Oral daily  phytonadione   Solution 5 milliGRAM(s) Oral daily  prednisoLONE    3 mG/mL Solution (ORAPRED) 40 milliGRAM(s) Oral daily  propranolol 40 milliGRAM(s) Oral every 8 hours  rifAXIMin 550 milliGRAM(s) Oral two times a day  thiamine 100 milliGRAM(s) Oral daily    MEDICATIONS  (PRN):  LORazepam   Injectable 2 milliGRAM(s) IV Push every 2 hours PRN Symptom-triggered: 2 point increase in CIWA -Ar score and a total score of 7 or LESS  LORazepam   Injectable 2 milliGRAM(s) IV Push every 1 hour PRN Symptom-triggered: each CIWA -Ar score 8 or GREATER  ondansetron Injectable 4 milliGRAM(s) IV Push every 6 hours PRN Nausea      Allergies    penicillin (Unknown)    Intolerances        Review of Systems:    see above       Vital Signs Last 24 Hrs  T(C): 36.3 (2020 08:00), Max: 36.8 (2020 19:57)  T(F): 97.4 (2020 08:00), Max: 98.3 (2020 19:57)  HR: 50 (2020 10:00) (50 - 83)  BP: 120/58 (2020 10:00) (77/47 - 159/84)  BP(mean): 77 (2020 10:00) (57 - 114)  RR: 14 (2020 10:00) (10 - 33)  SpO2: 100% (2020 10:00) (91% - 100%)    PHYSICAL EXAM:      Constitutional: lying in bed  HEENT: ncat  Gastrointestinal: soft nt +dt  Extremities: mild edema  Vascular: + peripheral pulses  Neurological: lethargic but arousable  Skin: jaundiced, scattered ecchymosis      LABS:                        7.7    9.60  )-----------( 72       ( 2020 05:18 )             24.3     07-14    137  |  102  |  16  ----------------------------<  112<H>  4.1   |  28  |  1.10    Ca    8.4<L>      2020 05:18  Phos  4.3     07-14  Mg     2.2     07-14    TPro  6.4  /  Alb  1.9<L>  /  TBili  24.1<HH>  /  DBili  x   /  AST  98<H>  /  ALT  48  /  AlkPhos  101  07-14    PT/INR - ( 2020 05:17 )   PT: 40.0 sec;   INR: 3.64 ratio         PTT - ( 2020 05:17 )  PTT:50.9 sec  Urinalysis Basic - ( 2020 17:16 )    Color: Claudine / Appearance: Slightly Turbid / S.010 / pH: x  Gluc: x / Ketone: Negative  / Bili: Large / Urobili: 12   Blood: x / Protein: Negative / Nitrite: Positive   Leuk Esterase: Trace / RBC: 0-2 /HPF / WBC 3-5   Sq Epi: x / Non Sq Epi: Occasional / Bacteria: Few        RADIOLOGY & ADDITIONAL TESTS:

## 2020-07-15 NOTE — PROGRESS NOTE ADULT - ASSESSMENT
Incomplete  50 year old man with Hx of EtOH cirrhosis, complicated by ascites, EV presenting with EtOH withdrawal and EtOH hepatitis with liver failure.     NEURO: EtOH withdrawal, came in agitated, CIWA 5-8 w PRN Ativan (0 x overnight), off librium, continue folate, multivitamin, and thiamine   CV: Hypotensive, d/c- amlodipine and spironolactone for now.        - Changed from propranolol 40mg q8 to Propranolol 20 mg q8 due to hypotension (on Nadolol 40mg at home)        - Give albumin if hypotension persists        - Right leg swelling, doppler 7/13 - no DVT, simple rt popliteal cyst   PULM: no acute issues  GI: Suspected bacterial peritonitis: paracentesis done- negative cultures, d/c aztreonam.            - EtOH hepatitis with liver failure- continue on prednisolone. After completing 26 days of prednisolone patient must be placed on a taper: 30mg for 1 week, 20mg for 1 week, and 10 mg for 1 week.           - f/u hepatic panel, LFTs trending down, Tbilli elevated at 24, rifaximin and lactulose, monitor for BMs    - RENAL: monitor electrolytes and replete as needed  - ID: d/c aztreonam- negative peritoneal culture   - HEME: Daily coags, no evidence of bleeding.   - SKIN: Monitor ecchymosis on left leg and right chest. Incomplete  50 year old man with Hx of EtOH cirrhosis, complicated by ascites, EV presenting with EtOH withdrawal and EtOH hepatitis with liver failure.     NEURO: EtOH withdrawal, CIWA 5-8 w PRN Ativan (0 x overnight), continue folate, multivitamin, and thiamine   CV: Bradycardia and Hypotensive, d/c- amlodipine and spironolactone for now.        - Give albumin if hypotension persists        - Right leg swelling, doppler 7/13 - no DVT, simple rt popliteal cyst          - Bradycardia 2/2 to propanolol   PULM: no acute issues  GI:     - Changed from propranolol 20mg q8 to Propranolol 10 mg q12 due to hypotension (on Nadolol 40mg at home) - portal HTN           - EtOH hepatitis with liver failure- continue on prednisolone. After completing 26 days of prednisolone patient must be placed on a taper: 30mg for 1 week, 20mg for 1 week, and 10 mg for 1 week.           - f/u hepatic panel, LFTs trending down, Tbilli elevated at 23.8, rifaximin and lactulose, monitor for BMs             - Paracentesis done- negative cultures, d/c aztreonam.   - RENAL: monitor electrolytes and replete as needed  - ID: No active infection- negative peritoneal culture, WBC count 11.49 likely 2/2 prednisolone   - HEME: Lovenox 40mg - DVT ppx. Daily coags, no evidence of bleeding.   - SKIN: Monitor ecchymosis on left leg and right chest.

## 2020-07-15 NOTE — PROGRESS NOTE ADULT - ATTENDING COMMENTS
50M PMH EtOH cirrhosis with portal hypertension, ascites, and hepatic encephalopathy who presents with acute alcoholic withdrawal with acute alcoholic hepatitis and liver failure.     - Continue lorazepam prn and CIWA protocol. No further lorazepam since yesterday morning. Continue thiamine, MVI, and folic acid  - Continue rifaximin and lactulose for hepatic encephalopathy. Goal >2 BM's daily  - Continue prednisolone for acute alcoholic hepatitis, DF>32. Will need to complete 28 days followed by taper  - Decrease propranolol to 10 mg q12h for portal hypertension (with hold parameters). Restart furosemide 20 mg daily. If HD stable, restart spironolactone tomorrow. Monitor transaminases, bilirubin, PT/INR. Follows with hepatologist Dr. Drew ([phone])  - HD and respiratory status stable  - Continue DASH/TLC diet + Ensure  - Stable kidney function and lytes, strict I/O's  - Peritoneal and blood cultures negative, observe off abx  - Continue enoxaparin for DVT ppx. LE dopplers negative for DVT. Monitor thrombocytopenia due to EtOH + cirrhosis (stable at 70). Hold enoxaparin if PLT<50  - Macrocytic anemia due to EtOH + liver disease. Continue folic acid supplementation. B12 level normal  - Discussed with patient at bedside, full code

## 2020-07-15 NOTE — PROGRESS NOTE ADULT - SUBJECTIVE AND OBJECTIVE BOX
Incomplete  Patient is a 50y old  Male who presents with a chief complaint of alcohol withdrawl, elevated bilirubin (15 Jul 2020 08:05)    24 hour events: ***    REVIEW OF SYSTEMS  Constitutional: No fever, chills, fatigue  Neuro: No headache, numbness, weakness  Resp: No cough, wheezing, shortness of breath  CVS: No chest pain, palpitations, leg swelling  GI: No abdominal pain, nausea, vomiting, diarrhea   : No dysuria, frequency, incontinence  Skin: No itching, burning, rashes, or lesions   Msk: No joint pain or swelling  Psych: No depression, anxiety, mood swings  Heme: No bleeding    T(F): 97.9 (07-15-20 @ 07:12), Max: 98 (07-14-20 @ 19:46)  HR: 50 (07-15-20 @ 07:00) (47 - 78)  BP: 91/50 (07-15-20 @ 07:00) (77/47 - 128/69)  RR: 12 (07-15-20 @ 07:00) (10 - 37)  SpO2: 97% (07-15-20 @ 07:00) (79% - 100%)  Wt(kg): --            I&O's Summary    07-14 @ 07:01  -  07-15 @ 07:00  --------------------------------------------------------  IN: 1890 mL / OUT: 0 mL / NET: 1890 mL      PHYSICAL EXAM  General:   CNS:   HEENT:   Resp:   CVS:   Abd:   Ext:   Skin:     MEDICATIONS  rifAXIMin Oral    propranolol Oral    prednisoLONE    3 mG/mL Solution (ORAPRED) Oral      LORazepam   Injectable IV Push PRN  LORazepam   Injectable IV Push PRN  ondansetron Injectable IV Push PRN      enoxaparin Injectable SubCutaneous    famotidine    Tablet Oral  lactulose Syrup Oral      ergocalciferol Oral  folic acid Oral  magnesium oxide Oral  multivitamin Oral  thiamine Oral                                8.0    11.49 )-----------( 70       ( 15 Jul 2020 05:45 )             24.4       07-15    136  |  103  |  21  ----------------------------<  81  4.2   |  28  |  1.00    Ca    8.5      15 Jul 2020 05:45  Phos  4.7     07-15  Mg     2.3     07-15    TPro  6.5  /  Alb  1.8<L>  /  TBili  23.8<HH>  /  DBili  x   /  AST  92<H>  /  ALT  48  /  AlkPhos  91  07-15          PT/INR - ( 15 Jul 2020 05:45 )   PT: 31.3 sec;   INR: 2.81 ratio             .Body Fluid Peritoneal Fluid   No growth   polymorphonuclear leukocytes seen  No organisms seen  by cytocentrifuge 07-13 @ 00:56  .Urine Clean Catch (Midstream)   <10,000 CFU/mL Normal Urogenital Aletha -- 07-12 @ 20:23  .Blood Blood   No growth to date. -- 07-12 @ 18:13        Radiology: ***  Bedside lung ultrasound: ***  Bedside ECHO: ***    CENTRAL LINE: Y/N          DATE INSERTED:              REMOVE: Y/N  DENNY: Y/N                        DATE INSERTED:              REMOVE: Y/N  A-LINE: Y/N                       DATE INSERTED:              REMOVE: Y/N    GLOBAL ISSUE/BEST PRACTICE  Analgesia:   Sedation:   CAM-ICU:   HOB elevation: yes  Stress ulcer prophylaxis:   VTE prophylaxis:   Glycemic control:   Nutrition:     CODE STATUS: ***  [facility] discussion: Y Incomplete  Patient is a 50y old  Male who presents with a chief complaint of alcohol withdrawl, elevated bilirubin (15 Jul 2020 08:05)    24 hour events: No significant overnight events. CIWA ranged from 5-8, no PRN medications given. Patients continues to have episodes of bradycardia and hypotension.     REVIEW OF SYSTEMS  Constitutional: No fever or fatigue  Neuro: No headache or weakness  Resp: No cough, wheezing, or shortness of breath  CVS: No chest pain, palpitations, leg swelling  GI: No abdominal pain, nausea, vomiting, diarrhea   Skin: No itching, burning,  Heme: No bleeding    T(F): 97.9 (07-15-20 @ 07:12), Max: 98 (07-14-20 @ 19:46)  HR: 50 (07-15-20 @ 07:00) (47 - 78)  BP: 91/50 (07-15-20 @ 07:00) (77/47 - 128/69)  RR: 12 (07-15-20 @ 07:00) (10 - 37)  SpO2: 97% (07-15-20 @ 07:00) (79% - 100%)  Wt(kg): --            I&O's Summary    07-14 @ 07:01  -  07-15 @ 07:00  --------------------------------------------------------  IN: 1890 mL / OUT: 0 mL / NET: 1890 mL    PHYSICAL EXAM  General: NAD  CNS: AOx3  HEENT: PERRLA   Resp: Distant breath sounds b/l  CVS: Bradycardia, Regular rhythm, S1, S2, no murmurs, or gallops  Abd: NT, BS+  Ext: mild b/l edema    Skin: Jaundiced (less severe than previously noted), ecchymosis noted on left LE and right upper chest     MEDICATIONS  rifAXIMin Oral    propranolol Oral    prednisoLONE    3 mG/mL Solution (ORAPRED) Oral      LORazepam   Injectable IV Push PRN  LORazepam   Injectable IV Push PRN  ondansetron Injectable IV Push PRN      enoxaparin Injectable SubCutaneous    famotidine    Tablet Oral  lactulose Syrup Oral      ergocalciferol Oral  folic acid Oral  magnesium oxide Oral  multivitamin Oral  thiamine Oral                                8.0    11.49 )-----------( 70       ( 15 Jul 2020 05:45 )             24.4       07-15    136  |  103  |  21  ----------------------------<  81  4.2   |  28  |  1.00    Ca    8.5      15 Jul 2020 05:45  Phos  4.7     07-15  Mg     2.3     07-15    TPro  6.5  /  Alb  1.8<L>  /  TBili  23.8<HH>  /  DBili  x   /  AST  92<H>  /  ALT  48  /  AlkPhos  91  07-15          PT/INR - ( 15 Jul 2020 05:45 )   PT: 31.3 sec;   INR: 2.81 ratio             .Body Fluid Peritoneal Fluid   No growth   polymorphonuclear leukocytes seen  No organisms seen  by cytocentrifuge 07-13 @ 00:56  .Urine Clean Catch (Midstream)   <10,000 CFU/mL Normal Urogenital Aletha -- 07-12 @ 20:23  .Blood Blood   No growth to date. -- 07-12 @ 18:13      Radiology:   Bedside lung ultrasound:  Bedside ECHO:     CENTRAL LINE: N          DATE INSERTED:              REMOVE: Y/N  DENNY: N                        DATE INSERTED:              REMOVE: Y/N  A-LINE: N                       DATE INSERTED:              REMOVE: Y/N    GLOBAL ISSUE/BEST PRACTICE  Analgesia: N   Sedation: N  HOB elevation: yes  Stress ulcer prophylaxis: famotidine 20mg   VTE prophylaxis: Lovenox 40 daily  Glycemic control: N  Nutrition: DASH diet    CODE STATUS: full code

## 2020-07-15 NOTE — PROGRESS NOTE ADULT - SUBJECTIVE AND OBJECTIVE BOX
Date/Time Patient Seen:  		  Referring MD:   Data Reviewed	       Patient is a 50y old  Male who presents with a chief complaint of alcohol withdrawal, decompensated liver failure (2020 11:47)      Subjective/HPI     PAST MEDICAL & SURGICAL HISTORY:  IBS (irritable bowel syndrome)  HTN (hypertension)  Ascites due to alcoholic cirrhosis  Cirrhosis of liver  ETOH abuse  Esophageal varices with bleedin2017  History of ankle surgery        Medication list         MEDICATIONS  (STANDING):  enoxaparin Injectable 40 milliGRAM(s) SubCutaneous daily  ergocalciferol 02224 Unit(s) Oral every week  famotidine    Tablet 20 milliGRAM(s) Oral two times a day  folic acid 1 milliGRAM(s) Oral daily  lactulose Syrup 20 Gram(s) Oral four times a day  magnesium oxide 400 milliGRAM(s) Oral daily  multivitamin 1 Tablet(s) Oral daily  prednisoLONE    3 mG/mL Solution (ORAPRED) 40 milliGRAM(s) Oral daily  propranolol 10 milliGRAM(s) Oral every 6 hours  rifAXIMin 550 milliGRAM(s) Oral two times a day  thiamine 100 milliGRAM(s) Oral daily    MEDICATIONS  (PRN):  LORazepam   Injectable 2 milliGRAM(s) IV Push every 2 hours PRN Symptom-triggered: 2 point increase in CIWA -Ar score and a total score of 7 or LESS  LORazepam   Injectable 2 milliGRAM(s) IV Push every 1 hour PRN Symptom-triggered: each CIWA -Ar score 8 or GREATER  ondansetron Injectable 4 milliGRAM(s) IV Push every 6 hours PRN Nausea         Vitals log        ICU Vital Signs Last 24 Hrs  T(C): 36.4 (15 Jul 2020 03:50), Max: 36.7 (2020 19:46)  T(F): 97.5 (15 Jul 2020 03:50), Max: 98 (2020 19:46)  HR: 50 (15 Jul 2020 07:00) (47 - 78)  BP: 91/50 (15 Jul 2020 07:00) (77/47 - 128/69)  BP(mean): 63 (15 Jul 2020 07:00) (57 - 91)  ABP: --  ABP(mean): --  RR: 12 (15 Jul 2020 07:00) (10 - 37)  SpO2: 97% (15 Jul 2020 07:00) (79% - 100%)           Input and Output:  I&O's Detail    2020 07:01  -  15 Jul 2020 07:00  --------------------------------------------------------  IN:    Lactated Ringers IV Bolus: 500 mL    lactated ringers.: 1000 mL    Oral Fluid: 390 mL  Total IN: 1890 mL    OUT:  Total OUT: 0 mL    Total NET: 1890 mL          Lab Data                        8.0    11.49 )-----------( 70       ( 15 Jul 2020 05:45 )             24.4     07-15    136  |  103  |  21  ----------------------------<  81  4.2   |  28  |  1.00    Ca    8.5      15 Jul 2020 05:45  Phos  4.7     07-15  Mg     2.3     07-15    TPro  6.5  /  Alb  1.8<L>  /  TBili  23.8<HH>  /  DBili  x   /  AST  92<H>  /  ALT  48  /  AlkPhos  91  07-15            Review of Systems	      Objective     Physical Examination    heart s1s2  lung dec BS  abd soft      Pertinent Lab findings & Imaging      Annel:  NO   Adequate UO     I&O's Detail    2020 07:  -  15 Jul 2020 07:00  --------------------------------------------------------  IN:    Lactated Ringers IV Bolus: 500 mL    lactated ringers.: 1000 mL    Oral Fluid: 390 mL  Total IN: 1890 mL    OUT:  Total OUT: 0 mL    Total NET: 1890 mL               Discussed with:     Cultures:	        Radiology

## 2020-07-15 NOTE — PROGRESS NOTE ADULT - SUBJECTIVE AND OBJECTIVE BOX
CC/F/U for:     HPI:  51 y/o M with a PMHx of alcohol dependence, liver cirrhosis, esophageal varices s/p banding in 2017, ascites s/p paracentesis 1 year ago, IBS, who comes in for detox and jaundice x 1 month. He states he relapsed in April after successfully quitting for 6 months previously. He has been admitted for detox multiple times in the past, in 2014 he was admitted at [facility] and 2017 at [facility]. He denies seizures in the past from withdrawal however he does endorse shaking, N, V when he doesnt drinks for 2-3 days. Pt states he drinks 1 quart of vodka daily and his last drink was yesterday around 3 PM. He follows with GI Dr. Drew at [facility] regularly for his cirrhosis. He noticed he was getting jaundiced about 1 month ago. He states the last time this happened his T bili was also elevated just like today. He also endorses falling 1 week ago onto his right hip. He has been able to walk on it but has pain when standing for a long time. Denies shooting pain into his leg, numbness tingling. He endorses chills, difficulty with balance, N, V of clear liquid when he doesn't drink for 2-3 days. He denies current fevers, chills, neck pain, visual changes, CP, SOB, cough, palpitations, abdominal pain, diarrhea, dysuria. He endorses chronic balance difficulties, abdominal distention without pain, yellowing of his skin and eyes,   darkening of his urine, right hip pain.    ED course:  vitals: T 98.5,  --> 79, /74 --> 127/65, RR 18, SpO2 96% RA  significant labs: Hg 8.1 (last 13.2 in 2019), HCT 24.2 (last 37.4 in 2019), Na 129, K+ 3.4, Cl 93, , , T bili 23.5, Blood ETOH 30, PT/INR 42/3.8, U/A large bili, trace ketone, 30 protein, small blood,   imaging: CXR shows Grossly clear lungs  R hip Xray unofficial read: appears grossly normal  EKG: pending in ED (10 Jul 2020 21:51)        INTERVAL HPI/OVERNIGHT EVENTS:  Pt seen and examined at bedside.     Allergies/Intolerance: penicillin (Unknown)      MEDICATIONS  (STANDING):  enoxaparin Injectable 40 milliGRAM(s) SubCutaneous daily  ergocalciferol 93448 Unit(s) Oral every week  famotidine    Tablet 20 milliGRAM(s) Oral two times a day  folic acid 1 milliGRAM(s) Oral daily  lactulose Syrup 20 Gram(s) Oral four times a day  magnesium oxide 400 milliGRAM(s) Oral daily  multivitamin 1 Tablet(s) Oral daily  prednisoLONE    3 mG/mL Solution (ORAPRED) 40 milliGRAM(s) Oral daily  propranolol 10 milliGRAM(s) Oral every 12 hours  rifAXIMin 550 milliGRAM(s) Oral two times a day  thiamine 100 milliGRAM(s) Oral daily    MEDICATIONS  (PRN):  LORazepam   Injectable 1 milliGRAM(s) IV Push every 2 hours PRN Symptom-triggered: 2 point increase in CIWA -Ar score and a total score of 7 or LESS  ondansetron Injectable 4 milliGRAM(s) IV Push every 6 hours PRN Nausea        ROS: as above; all other systems reviewed and wnl      PHYSICAL EXAMINATION:  Vital Signs Last 24 Hrs  T(C): 36.6 (15 Jul 2020 07:12), Max: 36.7 (14 Jul 2020 19:46)  T(F): 97.9 (15 Jul 2020 07:12), Max: 98 (14 Jul 2020 19:46)  HR: 52 (15 Jul 2020 09:00) (47 - 66)  BP: 87/53 (15 Jul 2020 09:00) (81/50 - 128/60)  BP(mean): 63 (15 Jul 2020 09:00) (59 - 89)  RR: 12 (15 Jul 2020 09:00) (10 - 37)  SpO2: 100% (15 Jul 2020 09:00) (79% - 100%)  CAPILLARY BLOOD GLUCOSE          GENERAL: NAD  HEENT: mucous membranes dry  CHEST: respirations unlabored  HEART: HR s  ABDOMEN: soft, ND, NT   EXTREMITIES:  no edema b/l LEs, no calf tenderness  NEURO: awake, alert, interactive; moves all extremities      LABS:                        8.0    11.49 )-----------( 70       ( 15 Jul 2020 05:45 )             24.4     07-15    136  |  103  |  21  ----------------------------<  81  4.2   |  28  |  1.00    Ca    8.5      15 Jul 2020 05:45  Phos  4.7     07-15  Mg     2.3     07-15    TPro  6.5  /  Alb  1.8<L>  /  TBili  23.8<HH>  /  DBili  x   /  AST  92<H>  /  ALT  48  /  AlkPhos  91  07-15    PT/INR - ( 15 Jul 2020 05:45 )   PT: 31.3 sec;   INR: 2.81 ratio                 RADIOLOGY & ADDITIONAL TESTS:      ASSESSMENT AND PLAN:  50y Male CC/F/U for: etoh w/dwl, hypoTN    HPI:  51 y/o M with a PMHx of alcohol dependence, liver cirrhosis, esophageal varices s/p banding in 2017, ascites s/p paracentesis 1 year ago, IBS, who comes in for detox and jaundice x 1 month. He states he relapsed in April after successfully quitting for 6 months previously. He has been admitted for detox multiple times in the past, in 2014 he was admitted at [facility] and 2017 at [facility]. He denies seizures in the past from withdrawal however he does endorse shaking, N, V when he doesnt drinks for 2-3 days. Pt states he drinks 1 quart of vodka daily and his last drink was yesterday around 3 PM. He follows with GI Dr. Drew at [facility] regularly for his cirrhosis. He noticed he was getting jaundiced about 1 month ago. He states the last time this happened his T bili was also elevated just like today. He also endorses falling 1 week ago onto his right hip. He has been able to walk on it but has pain when standing for a long time. Denies shooting pain into his leg, numbness tingling. He endorses chills, difficulty with balance, N, V of clear liquid when he doesn't drink for 2-3 days. He denies current fevers, chills, neck pain, visual changes, CP, SOB, cough, palpitations, abdominal pain, diarrhea, dysuria. He endorses chronic balance difficulties, abdominal distention without pain, yellowing of his skin and eyes,   darkening of his urine, right hip pain.    ED course:  vitals: T 98.5,  --> 79, /74 --> 127/65, RR 18, SpO2 96% RA  significant labs: Hg 8.1 (last 13.2 in 2019), HCT 24.2 (last 37.4 in 2019), Na 129, K+ 3.4, Cl 93, , , T bili 23.5, Blood ETOH 30, PT/INR 42/3.8, U/A large bili, trace ketone, 30 protein, small blood,   imaging: CXR shows Grossly clear lungs  R hip Xray unofficial read: appears grossly normal  EKG: pending in ED (10 Jul 2020 21:51)        INTERVAL HPI/OVERNIGHT EVENTS:  Pt seen and examined at bedside - remains hypoTNve in ICU.     Allergies/Intolerance: penicillin (Unknown)      MEDICATIONS  (STANDING):  enoxaparin Injectable 40 milliGRAM(s) SubCutaneous daily  ergocalciferol 86373 Unit(s) Oral every week  famotidine    Tablet 20 milliGRAM(s) Oral two times a day  folic acid 1 milliGRAM(s) Oral daily  lactulose Syrup 20 Gram(s) Oral four times a day  magnesium oxide 400 milliGRAM(s) Oral daily  multivitamin 1 Tablet(s) Oral daily  prednisoLONE    3 mG/mL Solution (ORAPRED) 40 milliGRAM(s) Oral daily  propranolol 10 milliGRAM(s) Oral every 12 hours  rifAXIMin 550 milliGRAM(s) Oral two times a day  thiamine 100 milliGRAM(s) Oral daily    MEDICATIONS  (PRN):  LORazepam   Injectable 1 milliGRAM(s) IV Push every 2 hours PRN Symptom-triggered: 2 point increase in CIWA -Ar score and a total score of 7 or LESS  ondansetron Injectable 4 milliGRAM(s) IV Push every 6 hours PRN Nausea    ROS: as above; all other systems reviewed and wnl    PHYSICAL EXAMINATION:  Vital Signs Last 24 Hrs  T(C): 36.6 (15 Jul 2020 07:12), Max: 36.7 (14 Jul 2020 19:46)  T(F): 97.9 (15 Jul 2020 07:12), Max: 98 (14 Jul 2020 19:46)  HR: 52 (15 Jul 2020 09:00) (47 - 66)  BP: 87/53 (15 Jul 2020 09:00) (81/50 - 128/60)  BP(mean): 63 (15 Jul 2020 09:00) (59 - 89)  RR: 12 (15 Jul 2020 09:00) (10 - 37)  SpO2: 100% (15 Jul 2020 09:00) (79% - 100%)    GENERAL: middle-aged male, NAD  HEENT: mucous membranes dry  CHEST: respirations unlabored  HEART: HR 50s  ABDOMEN: soft, distended, NT   EXTREMITIES: +edema b/l LEs, no calf tenderness  NEURO: awake, alert, interactive      LABS:                        8.0    11.49 )-----------( 70       ( 15 Jul 2020 05:45 )             24.4     07-15    136  |  103  |  21  ----------------------------<  81  4.2   |  28  |  1.00    Ca    8.5      15 Jul 2020 05:45  Phos  4.7     07-15  Mg     2.3     07-15    TPro  6.5  /  Alb  1.8<L>  /  TBili  23.8<HH>  /  DBili  x   /  AST  92<H>  /  ALT  48  /  AlkPhos  91  07-15    PT/INR - ( 15 Jul 2020 05:45 )   PT: 31.3 sec;   INR: 2.81 ratio

## 2020-07-15 NOTE — PROGRESS NOTE ADULT - PROBLEM SELECTOR PLAN 1
ESLD  cirrhosis  s/p diagnostic paracentesis - curr off ABX - cx and labs and cyto reviewed  on ativan PRN - ciwa - encephalopathy -   pancytopenia  prognosis poor  counseling - education - vitamins - Smoker and Active Drinker -  ESLD with high Meld Score and DF  on Steroids for ETOH Hepatitis.

## 2020-07-16 LAB
ALBUMIN SERPL ELPH-MCNC: 1.6 G/DL — LOW (ref 3.3–5)
ALP SERPL-CCNC: 103 U/L — SIGNIFICANT CHANGE UP (ref 40–120)
ALT FLD-CCNC: 42 U/L — SIGNIFICANT CHANGE UP (ref 12–78)
AMMONIA BLD-MCNC: 92 UMOL/L — HIGH (ref 11–32)
ANION GAP SERPL CALC-SCNC: 4 MMOL/L — LOW (ref 5–17)
AST SERPL-CCNC: 85 U/L — HIGH (ref 15–37)
BASOPHILS # BLD AUTO: 0.05 K/UL — SIGNIFICANT CHANGE UP (ref 0–0.2)
BASOPHILS NFR BLD AUTO: 0.5 % — SIGNIFICANT CHANGE UP (ref 0–2)
BILIRUB SERPL-MCNC: 21.4 MG/DL — CRITICAL HIGH (ref 0.2–1.2)
BUN SERPL-MCNC: 27 MG/DL — HIGH (ref 7–23)
CALCIUM SERPL-MCNC: 8.3 MG/DL — LOW (ref 8.5–10.1)
CHLORIDE SERPL-SCNC: 104 MMOL/L — SIGNIFICANT CHANGE UP (ref 96–108)
CO2 SERPL-SCNC: 32 MMOL/L — HIGH (ref 22–31)
CREAT SERPL-MCNC: 1.1 MG/DL — SIGNIFICANT CHANGE UP (ref 0.5–1.3)
EOSINOPHIL # BLD AUTO: 0.11 K/UL — SIGNIFICANT CHANGE UP (ref 0–0.5)
EOSINOPHIL NFR BLD AUTO: 1.1 % — SIGNIFICANT CHANGE UP (ref 0–6)
GLUCOSE SERPL-MCNC: 82 MG/DL — SIGNIFICANT CHANGE UP (ref 70–99)
HCT VFR BLD CALC: 24.8 % — LOW (ref 39–50)
HGB BLD-MCNC: 7.9 G/DL — LOW (ref 13–17)
IMM GRANULOCYTES NFR BLD AUTO: 5.8 % — HIGH (ref 0–1.5)
INR BLD: 3.28 RATIO — HIGH (ref 0.88–1.16)
LYMPHOCYTES # BLD AUTO: 1.59 K/UL — SIGNIFICANT CHANGE UP (ref 1–3.3)
LYMPHOCYTES # BLD AUTO: 16.5 % — SIGNIFICANT CHANGE UP (ref 13–44)
MAGNESIUM SERPL-MCNC: 2.3 MG/DL — SIGNIFICANT CHANGE UP (ref 1.6–2.6)
MCHC RBC-ENTMCNC: 31.9 GM/DL — LOW (ref 32–36)
MCHC RBC-ENTMCNC: 39.9 PG — HIGH (ref 27–34)
MCV RBC AUTO: 125.3 FL — HIGH (ref 80–100)
MONOCYTES # BLD AUTO: 2.45 K/UL — HIGH (ref 0–0.9)
MONOCYTES NFR BLD AUTO: 25.5 % — HIGH (ref 2–14)
NEUTROPHILS # BLD AUTO: 4.86 K/UL — SIGNIFICANT CHANGE UP (ref 1.8–7.4)
NEUTROPHILS NFR BLD AUTO: 50.6 % — SIGNIFICANT CHANGE UP (ref 43–77)
NRBC # BLD: 0 /100 WBCS — SIGNIFICANT CHANGE UP (ref 0–0)
PHOSPHATE SERPL-MCNC: 3.7 MG/DL — SIGNIFICANT CHANGE UP (ref 2.5–4.5)
PLATELET # BLD AUTO: 82 K/UL — LOW (ref 150–400)
POTASSIUM SERPL-MCNC: 4 MMOL/L — SIGNIFICANT CHANGE UP (ref 3.5–5.3)
POTASSIUM SERPL-SCNC: 4 MMOL/L — SIGNIFICANT CHANGE UP (ref 3.5–5.3)
PROT SERPL-MCNC: 5.9 G/DL — LOW (ref 6–8.3)
PROTHROM AB SERPL-ACNC: 36.3 SEC — HIGH (ref 10.6–13.6)
RBC # BLD: 1.98 M/UL — LOW (ref 4.2–5.8)
RBC # FLD: 18.1 % — HIGH (ref 10.3–14.5)
SODIUM SERPL-SCNC: 140 MMOL/L — SIGNIFICANT CHANGE UP (ref 135–145)
WBC # BLD: 9.62 K/UL — SIGNIFICANT CHANGE UP (ref 3.8–10.5)
WBC # FLD AUTO: 9.62 K/UL — SIGNIFICANT CHANGE UP (ref 3.8–10.5)

## 2020-07-16 PROCEDURE — 99233 SBSQ HOSP IP/OBS HIGH 50: CPT

## 2020-07-16 RX ORDER — LACTULOSE 10 G/15ML
20 SOLUTION ORAL ONCE
Refills: 0 | Status: COMPLETED | OUTPATIENT
Start: 2020-07-16 | End: 2020-07-16

## 2020-07-16 RX ADMIN — LACTULOSE 20 GRAM(S): 10 SOLUTION ORAL at 18:18

## 2020-07-16 RX ADMIN — LACTULOSE 20 GRAM(S): 10 SOLUTION ORAL at 00:49

## 2020-07-16 RX ADMIN — LACTULOSE 20 GRAM(S): 10 SOLUTION ORAL at 12:12

## 2020-07-16 RX ADMIN — LACTULOSE 20 GRAM(S): 10 SOLUTION ORAL at 06:30

## 2020-07-16 RX ADMIN — Medication 20 MILLIGRAM(S): at 06:30

## 2020-07-16 RX ADMIN — Medication 40 MILLIGRAM(S): at 06:30

## 2020-07-16 RX ADMIN — ENOXAPARIN SODIUM 40 MILLIGRAM(S): 100 INJECTION SUBCUTANEOUS at 12:12

## 2020-07-16 NOTE — PROVIDER CONTACT NOTE (CRITICAL VALUE NOTIFICATION) - TEST AND RESULT REPORTED:
Bilirubin 21.4
T.Bilirubin :2.4
total bili 23.5
total bilirubin 23.8
total los 24.0
total Bilirubin 24.0

## 2020-07-16 NOTE — PROGRESS NOTE ADULT - SUBJECTIVE AND OBJECTIVE BOX
Date/Time Patient Seen:  		  Referring MD:   Data Reviewed	       Patient is a 50y old  Male who presents with a chief complaint of alcohol withdrawl, elevated bilirubin (15 Jul 2020 17:22)      Subjective/HPI     PAST MEDICAL & SURGICAL HISTORY:  IBS (irritable bowel syndrome)  HTN (hypertension)  Ascites due to alcoholic cirrhosis  Cirrhosis of liver  ETOH abuse  Esophageal varices with bleedin2017  History of ankle surgery        Medication list         MEDICATIONS  (STANDING):  enoxaparin Injectable 40 milliGRAM(s) SubCutaneous daily  ergocalciferol 77052 Unit(s) Oral every week  famotidine    Tablet 20 milliGRAM(s) Oral two times a day  folic acid 1 milliGRAM(s) Oral daily  furosemide    Tablet 20 milliGRAM(s) Oral daily  lactulose Syrup 20 Gram(s) Oral four times a day  magnesium oxide 400 milliGRAM(s) Oral daily  multivitamin 1 Tablet(s) Oral daily  prednisoLONE    3 mG/mL Solution (ORAPRED) 40 milliGRAM(s) Oral daily  propranolol 10 milliGRAM(s) Oral every 12 hours  rifAXIMin 550 milliGRAM(s) Oral two times a day  thiamine 100 milliGRAM(s) Oral daily    MEDICATIONS  (PRN):  LORazepam   Injectable 1 milliGRAM(s) IV Push every 2 hours PRN Symptom-triggered: 2 point increase in CIWA -Ar score and a total score of 7 or LESS  ondansetron Injectable 4 milliGRAM(s) IV Push every 6 hours PRN Nausea         Vitals log        ICU Vital Signs Last 24 Hrs  T(C): 36.8 (2020 04:55), Max: 36.9 (15 Jul 2020 23:30)  T(F): 98.2 (2020 04:55), Max: 98.4 (15 Jul 2020 23:30)  HR: 58 (2020 04:55) (51 - 60)  BP: 105/68 (2020 04:55) (87/53 - 112/54)  BP(mean): 69 (15 Jul 2020 16:00) (63 - 78)  ABP: --  ABP(mean): --  RR: 18 (2020 04:55) (12 - 20)  SpO2: 98% (2020 04:55) (91% - 100%)           Input and Output:  I&O's Detail    15 Jul 2020 07:01  -  2020 07:00  --------------------------------------------------------  IN:    Oral Fluid: 620 mL  Total IN: 620 mL    OUT:  Total OUT: 0 mL    Total NET: 620 mL          Lab Data                        7.9    9.62  )-----------( 82       ( 2020 07:01 )             24.8     07-16    140  |  104  |  27<H>  ----------------------------<  82  4.0   |  32<H>  |  1.10    Ca    8.3<L>      2020 07:01  Phos  3.7     07-16  Mg     2.3     07-16    TPro  5.9<L>  /  Alb  1.6<L>  /  TBili  21.4<HH>  /  DBili  x   /  AST  85<H>  /  ALT  42  /  AlkPhos  103  07-16            Review of Systems	      Objective     Physical Examination    heart s1s2  lung dec BS  abd dist      Pertinent Lab findings & Imaging      Annel:  NO   Adequate UO     I&O's Detail    15 Jul 2020 07:01  -  2020 07:00  --------------------------------------------------------  IN:    Oral Fluid: 620 mL  Total IN: 620 mL    OUT:  Total OUT: 0 mL    Total NET: 620 mL               Discussed with:     Cultures:	        Radiology

## 2020-07-16 NOTE — PROGRESS NOTE ADULT - ASSESSMENT
Assessment and Recommendation:    Problem/Recommendation - 1:  Problem: Alcoholic hepatitis with ascites  sp dx tap; saag cw phtn, cx ngtd and cyto neg  cont prednisolone for high df   cont rifaximin and lactulose- titrate for 3 soft bms/day  would give extra dose of lactulose today  cont pepcid, propanolol w parameters, diuretics w primary  avoid hepatotoxins; monitor renal fxn; daily meld labs   low na/pro diet as tolerated  monitor gi function  to follow       Problem/Recommendation - 2:  ·  Problem: Liver failure, acute.  Recommendation: as above  decompensated cirrhosis       Problem/Recommendation - 3:  ·  Problem: Esophageal varices with bleeding.  Recommendation:   no s/s active gib  continue BB     Problem/Recommendation - 4:  ·  Problem: ETOH abuse.  Recommendation: GREGORIAWA protocol

## 2020-07-16 NOTE — PROGRESS NOTE ADULT - SUBJECTIVE AND OBJECTIVE BOX
INTERVAL HPI/OVERNIGHT EVENTS:  pt seen and examined  lethargic in bed but denies abd pain  new diet  no bm (sine  per flow sheets) no overt gib per nursing  elev ammonia    MEDICATIONS  (STANDING):  aztreonam  IVPB      aztreonam  IVPB 1000 milliGRAM(s) IV Intermittent every 8 hours  enoxaparin Injectable 40 milliGRAM(s) SubCutaneous daily  ergocalciferol 75397 Unit(s) Oral every week  famotidine    Tablet 20 milliGRAM(s) Oral two times a day  folic acid 1 milliGRAM(s) Oral daily  lactated ringers. 500 milliLiter(s) (100 mL/Hr) IV Continuous <Continuous>  lactulose Syrup 20 Gram(s) Oral four times a day  magnesium oxide 400 milliGRAM(s) Oral daily  multivitamin 1 Tablet(s) Oral daily  phytonadione   Solution 5 milliGRAM(s) Oral daily  prednisoLONE    3 mG/mL Solution (ORAPRED) 40 milliGRAM(s) Oral daily  propranolol 40 milliGRAM(s) Oral every 8 hours  rifAXIMin 550 milliGRAM(s) Oral two times a day  thiamine 100 milliGRAM(s) Oral daily    MEDICATIONS  (PRN):  LORazepam   Injectable 2 milliGRAM(s) IV Push every 2 hours PRN Symptom-triggered: 2 point increase in CIWA -Ar score and a total score of 7 or LESS  LORazepam   Injectable 2 milliGRAM(s) IV Push every 1 hour PRN Symptom-triggered: each CIWA -Ar score 8 or GREATER  ondansetron Injectable 4 milliGRAM(s) IV Push every 6 hours PRN Nausea      Allergies    penicillin (Unknown)    Intolerances        Review of Systems:    see above  unable to obtain in entirety       Vital Signs Last 24 Hrs  T(C): 36.3 (2020 08:00), Max: 36.8 (2020 19:57)  T(F): 97.4 (2020 08:00), Max: 98.3 (2020 19:57)  HR: 50 (2020 10:00) (50 - 83)  BP: 120/58 (2020 10:00) (77/47 - 159/84)  BP(mean): 77 (2020 10:00) (57 - 114)  RR: 14 (2020 10:00) (10 - 33)  SpO2: 100% (2020 10:00) (91% - 100%)    PHYSICAL EXAM:      Constitutional: lying in bed  HEENT: ncat  Gastrointestinal: soft nt +dt  Extremities: mild edema  Vascular: + peripheral pulses  Neurological: lethargic but arousable  Skin: jaundiced, scattered ecchymosis      LABS:                        7.7    9.60  )-----------( 72       ( 2020 05:18 )             24.3     07-14    137  |  102  |  16  ----------------------------<  112<H>  4.1   |  28  |  1.10    Ca    8.4<L>      2020 05:18  Phos  4.3     07-14  Mg     2.2     07-14    TPro  6.4  /  Alb  1.9<L>  /  TBili  24.1<HH>  /  DBili  x   /  AST  98<H>  /  ALT  48  /  AlkPhos  101  07-14    PT/INR - ( 2020 05:17 )   PT: 40.0 sec;   INR: 3.64 ratio         PTT - ( 2020 05:17 )  PTT:50.9 sec  Urinalysis Basic - ( 2020 17:16 )    Color: Claudine / Appearance: Slightly Turbid / S.010 / pH: x  Gluc: x / Ketone: Negative  / Bili: Large / Urobili: 12   Blood: x / Protein: Negative / Nitrite: Positive   Leuk Esterase: Trace / RBC: 0-2 /HPF / WBC 3-5   Sq Epi: x / Non Sq Epi: Occasional / Bacteria: Few        RADIOLOGY & ADDITIONAL TESTS:

## 2020-07-16 NOTE — PROGRESS NOTE ADULT - SUBJECTIVE AND OBJECTIVE BOX
CC/F/U for: etoh w/dwl, hypoTN    HPI:  49 y/o M with a PMHx of alcohol dependence, liver cirrhosis, esophageal varices s/p banding in 2017, ascites s/p paracentesis 1 year ago, IBS, who comes in for detox and jaundice x 1 month. He states he relapsed in April after successfully quitting for 6 months previously. He has been admitted for detox multiple times in the past, in 2014 he was admitted at [facility] and 2017 at [facility]. He denies seizures in the past from withdrawal however he does endorse shaking, N, V when he doesnt drinks for 2-3 days. Pt states he drinks 1 quart of vodka daily and his last drink was yesterday around 3 PM. He follows with GI Dr. Drew at [facility] regularly for his cirrhosis. He noticed he was getting jaundiced about 1 month ago. He states the last time this happened his T bili was also elevated just like today. He also endorses falling 1 week ago onto his right hip. He has been able to walk on it but has pain when standing for a long time. Denies shooting pain into his leg, numbness tingling. He endorses chills, difficulty with balance, N, V of clear liquid when he doesn't drink for 2-3 days. He denies current fevers, chills, neck pain, visual changes, CP, SOB, cough, palpitations, abdominal pain, diarrhea, dysuria. He endorses chronic balance difficulties, abdominal distention without pain, yellowing of his skin and eyes,   darkening of his urine, right hip pain.    ED course:  vitals: T 98.5,  --> 79, /74 --> 127/65, RR 18, SpO2 96% RA  significant labs: Hg 8.1 (last 13.2 in 2019), HCT 24.2 (last 37.4 in 2019), Na 129, K+ 3.4, Cl 93, , , T bili 23.5, Blood ETOH 30, PT/INR 42/3.8, U/A large bili, trace ketone, 30 protein, small blood,   imaging: CXR shows Grossly clear lungs  R hip Xray unofficial read: appears grossly normal  EKG: pending in ED (10 Jul 2020 21:51)      INTERVAL HPI/OVERNIGHT EVENTS:  Pt seen and examined at bedside - trans ICU-> floor overnight; BP improved; Tbili still 20s; having diarrhea to lactulose    Allergies/Intolerance: penicillin (Unknown)      MEDICATIONS  (STANDING):  enoxaparin Injectable 40 milliGRAM(s) SubCutaneous daily  ergocalciferol 60489 Unit(s) Oral every week  famotidine    Tablet 20 milliGRAM(s) Oral two times a day  folic acid 1 milliGRAM(s) Oral daily  furosemide    Tablet 20 milliGRAM(s) Oral daily  lactulose Syrup 20 Gram(s) Oral four times a day  lactulose Syrup 20 Gram(s) Oral once  magnesium oxide 400 milliGRAM(s) Oral daily  multivitamin 1 Tablet(s) Oral daily  prednisoLONE    3 mG/mL Solution (ORAPRED) 40 milliGRAM(s) Oral daily  propranolol 10 milliGRAM(s) Oral every 12 hours  rifAXIMin 550 milliGRAM(s) Oral two times a day  thiamine 100 milliGRAM(s) Oral daily    MEDICATIONS  (PRN):  LORazepam   Injectable 1 milliGRAM(s) IV Push every 2 hours PRN Symptom-triggered: 2 point increase in CIWA -Ar score and a total score of 7 or LESS  ondansetron Injectable 4 milliGRAM(s) IV Push every 6 hours PRN Nausea      ROS: as above; all other systems reviewed and wnl      PHYSICAL EXAMINATION:  Vital Signs Last 24 Hrs  T(C): 36.3 (16 Jul 2020 09:55), Max: 36.9 (15 Jul 2020 23:30)  T(F): 97.3 (16 Jul 2020 09:55), Max: 98.4 (15 Jul 2020 23:30)  HR: 57 (16 Jul 2020 09:55) (51 - 60)  BP: 116/68 (16 Jul 2020 09:55) (88/60 - 116/68)  BP(mean): 69 (15 Jul 2020 16:00) (69 - 78)  RR: 16 (16 Jul 2020 09:55) (12 - 18)  SpO2: 98% (16 Jul 2020 09:55) (91% - 100%)    GENERAL: jaundiced, middle-aged male, NAD  HEENT: mucous membranes dry  CHEST: respirations unlabored  HEART: HR 50s  ABDOMEN: soft, distended, NT   EXTREMITIES: +edema b/l LEs, no calf tenderness  NEURO: awake, alert, interactive; speech slow    LABS:                        7.9    9.62  )-----------( 82       ( 16 Jul 2020 07:01 )             24.8     07-16    140  |  104  |  27<H>  ----------------------------<  82  4.0   |  32<H>  |  1.10    Ca    8.3<L>      16 Jul 2020 07:01  Phos  3.7     07-16  Mg     2.3     07-16    TPro  5.9<L>  /  Alb  1.6<L>  /  TBili  21.4<HH>  /  DBili  x   /  AST  85<H>  /  ALT  42  /  AlkPhos  103  07-16    PT/INR - ( 16 Jul 2020 07:01 )   PT: 36.3 sec;   INR: 3.28 ratio

## 2020-07-16 NOTE — PROGRESS NOTE ADULT - ATTENDING COMMENTS
50M, etoh abuse/seizures, liver cirrhosis c/b esoph varices/banding, ascites, IBS; mgmt for decompensated liver failure, etoh w/dwl syndrome - s/p ICU, now trans to floor for continued optimization     acute hepatitis/jaundice in setting of etoh abuse   -continues on prednisolone - plan for 26days, then taper: 30mg x1wk, 20mg x 1wk, 10mg x 1wk  -trending LFTs w/Tbili 20s     decompensated hepatic cirrhosis w/liver failure  -AMS/hepatic encephalopathy - improved mental status, though speech slow - continues on lactulose, rifaximin  -anemia/thrombocytopenia - no ann marie e/o active bleeding - trending counts; prn supportive transfusions  -coagulopathy - elev INR to peak 4s-> remains in 3s range - monitoring  -portal HTN - on propranolol inhouse as hemodynamics permit ->to resume nadolol as o/p  -r/o'd SBP - neg eval, abxs off    hypoTN in setting of liver failure, volume depletion   -holding norvasc, spironolactone  -s/p intermittent doses of IV albumin prn    etoh w/dwl, etoh abuse w/hx seizures  -completed librium taper  -continue vitamin/mineral supplementation  -not on antiepileptics  -encourage cessation of etoh    dvt prophy - LE dopplers NEG for DVT

## 2020-07-17 ENCOUNTER — INPATIENT (INPATIENT)
Facility: HOSPITAL | Age: 50
LOS: 4 days | Discharge: HOME | DRG: 442 | End: 2020-07-22
Attending: HOSPITALIST | Admitting: STUDENT IN AN ORGANIZED HEALTH CARE EDUCATION/TRAINING PROGRAM
Payer: COMMERCIAL

## 2020-07-17 ENCOUNTER — TRANSCRIPTION ENCOUNTER (OUTPATIENT)
Age: 50
End: 2020-07-17

## 2020-07-17 VITALS
HEART RATE: 66 BPM | DIASTOLIC BLOOD PRESSURE: 72 MMHG | TEMPERATURE: 98 F | WEIGHT: 184.97 LBS | RESPIRATION RATE: 18 BRPM | OXYGEN SATURATION: 94 % | SYSTOLIC BLOOD PRESSURE: 111 MMHG

## 2020-07-17 VITALS
RESPIRATION RATE: 17 BRPM | HEART RATE: 63 BPM | DIASTOLIC BLOOD PRESSURE: 71 MMHG | SYSTOLIC BLOOD PRESSURE: 117 MMHG | TEMPERATURE: 98 F | OXYGEN SATURATION: 96 %

## 2020-07-17 DIAGNOSIS — K72.00 ACUTE AND SUBACUTE HEPATIC FAILURE WITHOUT COMA: ICD-10-CM

## 2020-07-17 DIAGNOSIS — K70.11 ALCOHOLIC HEPATITIS WITH ASCITES: ICD-10-CM

## 2020-07-17 DIAGNOSIS — Z98.890 OTHER SPECIFIED POSTPROCEDURAL STATES: Chronic | ICD-10-CM

## 2020-07-17 LAB
CULTURE RESULTS: SIGNIFICANT CHANGE UP
SPECIMEN SOURCE: SIGNIFICANT CHANGE UP

## 2020-07-17 PROCEDURE — 87102 FUNGUS ISOLATION CULTURE: CPT

## 2020-07-17 PROCEDURE — 71045 X-RAY EXAM CHEST 1 VIEW: CPT

## 2020-07-17 PROCEDURE — 82746 ASSAY OF FOLIC ACID SERUM: CPT

## 2020-07-17 PROCEDURE — 73502 X-RAY EXAM HIP UNI 2-3 VIEWS: CPT

## 2020-07-17 PROCEDURE — 86803 HEPATITIS C AB TEST: CPT

## 2020-07-17 PROCEDURE — 87340 HEPATITIS B SURFACE AG IA: CPT

## 2020-07-17 PROCEDURE — 84100 ASSAY OF PHOSPHORUS: CPT

## 2020-07-17 PROCEDURE — 87075 CULTR BACTERIA EXCEPT BLOOD: CPT

## 2020-07-17 PROCEDURE — 93005 ELECTROCARDIOGRAM TRACING: CPT

## 2020-07-17 PROCEDURE — 93970 EXTREMITY STUDY: CPT

## 2020-07-17 PROCEDURE — 83540 ASSAY OF IRON: CPT

## 2020-07-17 PROCEDURE — 85730 THROMBOPLASTIN TIME PARTIAL: CPT

## 2020-07-17 PROCEDURE — 82945 GLUCOSE OTHER FLUID: CPT

## 2020-07-17 PROCEDURE — 99233 SBSQ HOSP IP/OBS HIGH 50: CPT

## 2020-07-17 PROCEDURE — 76700 US EXAM ABDOM COMPLETE: CPT

## 2020-07-17 PROCEDURE — 87070 CULTURE OTHR SPECIMN AEROBIC: CPT

## 2020-07-17 PROCEDURE — P9047: CPT

## 2020-07-17 PROCEDURE — 86850 RBC ANTIBODY SCREEN: CPT

## 2020-07-17 PROCEDURE — 88305 TISSUE EXAM BY PATHOLOGIST: CPT

## 2020-07-17 PROCEDURE — 86706 HEP B SURFACE ANTIBODY: CPT

## 2020-07-17 PROCEDURE — 88108 CYTOPATH CONCENTRATE TECH: CPT

## 2020-07-17 PROCEDURE — 83615 LACTATE (LD) (LDH) ENZYME: CPT

## 2020-07-17 PROCEDURE — 89051 BODY FLUID CELL COUNT: CPT

## 2020-07-17 PROCEDURE — 96375 TX/PRO/DX INJ NEW DRUG ADDON: CPT

## 2020-07-17 PROCEDURE — 85610 PROTHROMBIN TIME: CPT

## 2020-07-17 PROCEDURE — 82306 VITAMIN D 25 HYDROXY: CPT

## 2020-07-17 PROCEDURE — 82042 OTHER SOURCE ALBUMIN QUAN EA: CPT

## 2020-07-17 PROCEDURE — 87635 SARS-COV-2 COVID-19 AMP PRB: CPT

## 2020-07-17 PROCEDURE — 80307 DRUG TEST PRSMV CHEM ANLYZR: CPT

## 2020-07-17 PROCEDURE — 80053 COMPREHEN METABOLIC PANEL: CPT

## 2020-07-17 PROCEDURE — 87205 SMEAR GRAM STAIN: CPT

## 2020-07-17 PROCEDURE — 82728 ASSAY OF FERRITIN: CPT

## 2020-07-17 PROCEDURE — 82140 ASSAY OF AMMONIA: CPT

## 2020-07-17 PROCEDURE — 83550 IRON BINDING TEST: CPT

## 2020-07-17 PROCEDURE — 36415 COLL VENOUS BLD VENIPUNCTURE: CPT

## 2020-07-17 PROCEDURE — 86901 BLOOD TYPING SEROLOGIC RH(D): CPT

## 2020-07-17 PROCEDURE — 86900 BLOOD TYPING SEROLOGIC ABO: CPT

## 2020-07-17 PROCEDURE — 87040 BLOOD CULTURE FOR BACTERIA: CPT

## 2020-07-17 PROCEDURE — 83690 ASSAY OF LIPASE: CPT

## 2020-07-17 PROCEDURE — 87086 URINE CULTURE/COLONY COUNT: CPT

## 2020-07-17 PROCEDURE — 83735 ASSAY OF MAGNESIUM: CPT

## 2020-07-17 PROCEDURE — 99223 1ST HOSP IP/OBS HIGH 75: CPT | Mod: GC

## 2020-07-17 PROCEDURE — 86769 SARS-COV-2 COVID-19 ANTIBODY: CPT

## 2020-07-17 PROCEDURE — 85027 COMPLETE CBC AUTOMATED: CPT

## 2020-07-17 PROCEDURE — 81001 URINALYSIS AUTO W/SCOPE: CPT

## 2020-07-17 PROCEDURE — 84157 ASSAY OF PROTEIN OTHER: CPT

## 2020-07-17 PROCEDURE — 80048 BASIC METABOLIC PNL TOTAL CA: CPT

## 2020-07-17 PROCEDURE — 82607 VITAMIN B-12: CPT

## 2020-07-17 PROCEDURE — 96374 THER/PROPH/DIAG INJ IV PUSH: CPT

## 2020-07-17 PROCEDURE — 99285 EMERGENCY DEPT VISIT HI MDM: CPT | Mod: 25

## 2020-07-17 RX ORDER — THIAMINE MONONITRATE (VIT B1) 100 MG
2 TABLET ORAL
Qty: 0 | Refills: 0 | DISCHARGE

## 2020-07-17 RX ORDER — NADOLOL 80 MG/1
1 TABLET ORAL
Qty: 0 | Refills: 0 | DISCHARGE

## 2020-07-17 RX ORDER — MAGNESIUM OXIDE 400 MG ORAL TABLET 241.3 MG
400 TABLET ORAL DAILY
Refills: 0 | Status: DISCONTINUED | OUTPATIENT
Start: 2020-07-17 | End: 2020-07-22

## 2020-07-17 RX ORDER — MAGNESIUM OXIDE 400 MG ORAL TABLET 241.3 MG
1 TABLET ORAL
Qty: 0 | Refills: 0 | DISCHARGE
Start: 2020-07-17

## 2020-07-17 RX ORDER — PROPRANOLOL HCL 160 MG
1 CAPSULE, EXTENDED RELEASE 24HR ORAL
Qty: 0 | Refills: 0 | DISCHARGE
Start: 2020-07-17

## 2020-07-17 RX ORDER — RANITIDINE HYDROCHLORIDE 150 MG/1
2 TABLET, FILM COATED ORAL
Qty: 0 | Refills: 0 | DISCHARGE

## 2020-07-17 RX ORDER — FUROSEMIDE 40 MG
1 TABLET ORAL
Qty: 0 | Refills: 0 | DISCHARGE

## 2020-07-17 RX ORDER — SPIRONOLACTONE 25 MG/1
1.5 TABLET, FILM COATED ORAL
Qty: 0 | Refills: 0 | DISCHARGE

## 2020-07-17 RX ORDER — THIAMINE MONONITRATE (VIT B1) 100 MG
100 TABLET ORAL DAILY
Refills: 0 | Status: DISCONTINUED | OUTPATIENT
Start: 2020-07-17 | End: 2020-07-22

## 2020-07-17 RX ORDER — ONDANSETRON 8 MG/1
0 TABLET, FILM COATED ORAL
Qty: 0 | Refills: 0 | DISCHARGE
Start: 2020-07-17

## 2020-07-17 RX ORDER — PREDNISOLONE 5 MG
10 TABLET ORAL
Qty: 0 | Refills: 0 | DISCHARGE
Start: 2020-07-17

## 2020-07-17 RX ORDER — FAMOTIDINE 10 MG/ML
1 INJECTION INTRAVENOUS
Qty: 0 | Refills: 0 | DISCHARGE
Start: 2020-07-17

## 2020-07-17 RX ORDER — PREDNISOLONE 5 MG
13.33 TABLET ORAL
Qty: 0 | Refills: 0 | DISCHARGE
Start: 2020-07-17

## 2020-07-17 RX ORDER — FUROSEMIDE 40 MG
1 TABLET ORAL
Qty: 0 | Refills: 0 | DISCHARGE
Start: 2020-07-17

## 2020-07-17 RX ORDER — PREDNISOLONE 5 MG
6.67 TABLET ORAL
Qty: 0 | Refills: 0 | DISCHARGE
Start: 2020-07-17

## 2020-07-17 RX ORDER — LACTULOSE 10 G/15ML
20 SOLUTION ORAL
Refills: 0 | Status: DISCONTINUED | OUTPATIENT
Start: 2020-07-17 | End: 2020-07-18

## 2020-07-17 RX ORDER — AMLODIPINE BESYLATE 2.5 MG/1
1 TABLET ORAL
Qty: 0 | Refills: 0 | DISCHARGE

## 2020-07-17 RX ORDER — FUROSEMIDE 40 MG
2 TABLET ORAL
Qty: 0 | Refills: 0 | DISCHARGE
Start: 2020-07-17

## 2020-07-17 RX ORDER — FOLIC ACID 0.8 MG
1 TABLET ORAL
Qty: 0 | Refills: 0 | DISCHARGE

## 2020-07-17 RX ORDER — ERGOCALCIFEROL 1.25 MG/1
1 CAPSULE ORAL
Qty: 0 | Refills: 0 | DISCHARGE

## 2020-07-17 RX ORDER — THIAMINE MONONITRATE (VIT B1) 100 MG
1 TABLET ORAL
Qty: 0 | Refills: 0 | DISCHARGE
Start: 2020-07-17

## 2020-07-17 RX ORDER — LACTULOSE 10 G/15ML
30 SOLUTION ORAL
Qty: 0 | Refills: 0 | DISCHARGE
Start: 2020-07-17

## 2020-07-17 RX ORDER — FOLIC ACID 0.8 MG
1 TABLET ORAL DAILY
Refills: 0 | Status: DISCONTINUED | OUTPATIENT
Start: 2020-07-17 | End: 2020-07-22

## 2020-07-17 RX ORDER — FAMOTIDINE 10 MG/ML
20 INJECTION INTRAVENOUS DAILY
Refills: 0 | Status: DISCONTINUED | OUTPATIENT
Start: 2020-07-17 | End: 2020-07-22

## 2020-07-17 RX ORDER — FOLIC ACID 0.8 MG
1 TABLET ORAL
Qty: 0 | Refills: 0 | DISCHARGE
Start: 2020-07-17

## 2020-07-17 RX ORDER — MAGNESIUM OXIDE 400 MG ORAL TABLET 241.3 MG
1 TABLET ORAL
Qty: 0 | Refills: 0 | DISCHARGE

## 2020-07-17 RX ORDER — PREDNISOLONE 5 MG
3.33 TABLET ORAL
Qty: 0 | Refills: 0 | DISCHARGE
Start: 2020-07-17

## 2020-07-17 RX ORDER — ONDANSETRON 8 MG/1
8 TABLET, FILM COATED ORAL
Refills: 0 | Status: DISCONTINUED | OUTPATIENT
Start: 2020-07-17 | End: 2020-07-22

## 2020-07-17 RX ADMIN — LACTULOSE 20 GRAM(S): 10 SOLUTION ORAL at 23:58

## 2020-07-17 RX ADMIN — ENOXAPARIN SODIUM 40 MILLIGRAM(S): 100 INJECTION SUBCUTANEOUS at 11:15

## 2020-07-17 RX ADMIN — LACTULOSE 20 GRAM(S): 10 SOLUTION ORAL at 05:27

## 2020-07-17 RX ADMIN — LACTULOSE 20 GRAM(S): 10 SOLUTION ORAL at 17:21

## 2020-07-17 RX ADMIN — LACTULOSE 20 GRAM(S): 10 SOLUTION ORAL at 11:16

## 2020-07-17 RX ADMIN — Medication 20 MILLIGRAM(S): at 05:27

## 2020-07-17 RX ADMIN — Medication 40 MILLIGRAM(S): at 05:27

## 2020-07-17 NOTE — H&P ADULT - PROBLEM SELECTOR PLAN 1
Decompensated hepatic cirrhosis w/liver failure  -AMS/hepatic encephalopathy - improved mental status, though speech slow - continues on lactulose, rifaximin  -coagulopathy - elev INR to peak 4s-> remains in 3s range currently - monitoring  -portal HTN - on propranolol inhouse as hemodynamics permit ->to resume nadolol as o/p  -r/o'd SBP - neg eval, abxs off Decompensated hepatic cirrhosis w/liver failure  -cw rifaximin and lactulose for hepatic encephalopathy   -trend INR, will resume vitamin k  -with ascites. s/p paracentesis at [place]. fluid studies not suggestive of SBP. BCx and UCx 7/12 NGTD. s/p aztreonam from 7/13-7/14.   -cw cipro for sbp ppx  -did not appear to be on home spironolactone while at [place]. will resume with hold parameters. Decompensated hepatic cirrhosis w/liver failure. MELD score today is 32.   -cw rifaximin and lactulose for hepatic encephalopathy   -trend INR, will resume vitamin k  -with ascites. s/p paracentesis at [place]. fluid studies not suggestive of SBP. BCx and UCx 7/12 NGTD. s/p aztreonam from 7/13-7/14.   -cw cipro for sbp ppx  -cw lasix and spironolactone daily

## 2020-07-17 NOTE — DISCHARGE NOTE NURSING/CASE MANAGEMENT/SOCIAL WORK - NSDCPEPRADAXA_GEN_ALL_CORE
Dabigatran/Pradaxa - Compliance/Dabigatran/Pradaxa - Dietary Advice/Dabigatran/Pradaxa - Follow up monitoring/Dabigatran/Pradaxa - Potential for adverse drug reactions and interactions

## 2020-07-17 NOTE — DISCHARGE NOTE NURSING/CASE MANAGEMENT/SOCIAL WORK - PATIENT PORTAL LINK FT
You can access the FollowMyHealth Patient Portal offered by [facility] by registering at the following website: [email or web address]. By joining [facility]’s FollowMyHealth portal, you will also be able to view your health information using other applications (apps) compatible with our system.

## 2020-07-17 NOTE — PROGRESS NOTE ADULT - PROBLEM SELECTOR PROBLEM 1
Acute liver failure without hepatic coma
Alcohol use
Alcohol withdrawal syndrome, with delirium
Liver failure, acute
Acute liver failure without hepatic coma
Liver failure, acute

## 2020-07-17 NOTE — PROGRESS NOTE ADULT - SUBJECTIVE AND OBJECTIVE BOX
Date/Time Patient Seen:  		  Referring MD:   Data Reviewed	       Patient is a 50y old  Male who presents with a chief complaint of alcohol withdrawl, elevated bilirubin (2020 11:10)      Subjective/HPI     PAST MEDICAL & SURGICAL HISTORY:  IBS (irritable bowel syndrome)  HTN (hypertension)  Ascites due to alcoholic cirrhosis  Cirrhosis of liver  ETOH abuse  Esophageal varices with bleedin2017  History of ankle surgery        Medication list         MEDICATIONS  (STANDING):  enoxaparin Injectable 40 milliGRAM(s) SubCutaneous daily  ergocalciferol 35323 Unit(s) Oral every week  famotidine    Tablet 20 milliGRAM(s) Oral two times a day  folic acid 1 milliGRAM(s) Oral daily  furosemide    Tablet 20 milliGRAM(s) Oral daily  lactulose Syrup 20 Gram(s) Oral four times a day  magnesium oxide 400 milliGRAM(s) Oral daily  multivitamin 1 Tablet(s) Oral daily  prednisoLONE    3 mG/mL Solution (ORAPRED) 40 milliGRAM(s) Oral daily  propranolol 10 milliGRAM(s) Oral every 12 hours  rifAXIMin 550 milliGRAM(s) Oral two times a day  thiamine 100 milliGRAM(s) Oral daily    MEDICATIONS  (PRN):  LORazepam   Injectable 1 milliGRAM(s) IV Push every 2 hours PRN Symptom-triggered: 2 point increase in CIWA -Ar score and a total score of 7 or LESS  ondansetron Injectable 4 milliGRAM(s) IV Push every 6 hours PRN Nausea         Vitals log        ICU Vital Signs Last 24 Hrs  T(C): 36.8 (2020 07:32), Max: 36.8 (2020 12:31)  T(F): 98.3 (2020 07:32), Max: 98.3 (2020 07:32)  HR: 68 (2020 07:32) (56 - 68)  BP: 114/74 (2020 07:32) (103/61 - 116/68)  BP(mean): --  ABP: --  ABP(mean): --  RR: 17 (2020 07:32) (16 - 19)  SpO2: 99% (2020 07:32) (92% - 99%)           Input and Output:  I&O's Detail    2020 07:01  -  2020 07:00  --------------------------------------------------------  IN:    Oral Fluid: 200 mL  Total IN: 200 mL    OUT:  Total OUT: 0 mL    Total NET: 200 mL          Lab Data                        7.9    9.62  )-----------( 82       ( 2020 07:01 )             24.8     07-16    140  |  104  |  27<H>  ----------------------------<  82  4.0   |  32<H>  |  1.10    Ca    8.3<L>      2020 07:01  Phos  3.7     07-16  Mg     2.3     07-16    TPro  5.9<L>  /  Alb  1.6<L>  /  TBili  21.4<HH>  /  DBili  x   /  AST  85<H>  /  ALT  42  /  AlkPhos  103  07-16            Review of Systems	      Objective     Physical Examination    heart s1s2  lung dec BS  abd soft  on o2 support      Pertinent Lab findings & Imaging      Annel:  NO   Adequate UO     I&O's Detail    2020 07:01  -  2020 07:00  --------------------------------------------------------  IN:    Oral Fluid: 200 mL  Total IN: 200 mL    OUT:  Total OUT: 0 mL    Total NET: 200 mL               Discussed with:     Cultures:	        Radiology

## 2020-07-17 NOTE — H&P ADULT - HISTORY OF PRESENT ILLNESS
51 y/o M with a PMHx of alcohol dependence, liver cirrhosis, esophageal varices s/p banding in 2017, ascites s/p paracentesis 1 year ago, IBS, who is transferred to [facility] for liver transplant eval.    He initially presented to [facility] for detox and jaundice x 1 month. He states he relapsed in April after successfully quitting for 6 months previously. He has been admitted for detox multiple times in the past, in 2014 he was admitted at [facility] and 2017 at [facility]. He denies seizures in the past from withdrawal however he does endorse shaking, N, V when he doesnt drinks for 2-3 days. Pt states he drinks 1 quart of vodka daily and his last drink was yesterday around 3 PM. He follows with GI Dr. Drew at [facility] regularly for his cirrhosis. He noticed he was getting jaundiced about 1 month ago. He states the last time this happened his T bili was also elevated just like today. He also endorses falling 1 week ago onto his right hip. He has been able to walk on it but has pain when standing for a long time. Denies shooting pain into his leg, numbness tingling. He endorses chills, difficulty with balance, N, V of clear liquid when he doesn't drink for 2-3 days. He denies current fevers, chills, neck pain, visual changes, CP, SOB, cough, palpitations, abdominal pain, diarrhea, dysuria. He endorses chronic balance difficulties, abdominal distention without pain, yellowing of his skin and eyes,   darkening of his urine, right hip pain.    ED course:  vitals: T 98.5,  --> 79, /74 --> 127/65, RR 18, SpO2 96% RA  significant labs: Hg 8.1 (last 13.2 in 2019), HCT 24.2 (last 37.4 in 2019), Na 129, K+ 3.4, Cl 93, , , T bili 23.5, Blood ETOH 30, PT/INR 42/3.8, U/A large bili, trace ketone, 30 protein, small blood,   imaging: CXR shows Grossly clear lungs  R hip Xray unofficial read: appears grossly normal  EKG: pending in ED (10 Jul 2020 21:51)    HOSPITAL COURSE: Patient was admitted to the ICU after he grew more encephalopathic with an elevated ammonia level and agitated with a CIWA score of 16. He was given ativan and was placed on Librium initially. He was taken off librium with in his first day at the ICU, due to hepatic function labs that included elevated ammonia levels and elevated total bilirubin levels. He was given prednisolone to manage his alcohol hepatitis and placed on a lactulose and rifaximin regimen. To rule out bacterial peritonitis a paracentesis was done, and until cultures were read, the patient was placed on empiric aztreonam - w/u remained neg for SBP and abxs were d/c'd. Pt tx'd for acute hepatitis, decompensated cirrhosis w/AMS/hepatic encephalopathy, anemia/thrombocytopenia, coagulopathy, hemodynamic compromise w/hypoTN, etoh w/dwl - pt tx'd w/prednisolone, IV albumin, ativan/librium protocol - pt hemodynamically improved, mental status improved - pt transferred to Telemetry. Hepatology team/Dr Drew at [facility] called, and requested transfer of patient to their team at [facility], where patient usually follows. Arrangements made via Transfer Center to transfer pt to his hepatology team at [facility], Dr Drew,  for continued management of decompensated liver failure; pt to be admitted to Hospitalist service Dr Amie Madera service. 49 y/o M with a PMHx of alcohol dependence, liver cirrhosis, esophageal varices s/p banding in 2017, ascites s/p paracentesis 1 year ago, IBS, who is transferred to [facility] for liver transplant eval. History obtained from EMR.    He initially presented to [facility] for detox and jaundice x 1 month. He states he relapsed in April after successfully quitting for 6 months previously. He has been admitted for detox multiple times in the past, in 2014 he was admitted at [facility] and 2017 at [facility]. He denies seizures in the past from withdrawal however he does endorse shaking, N, V when he doesnt drinks for 2-3 days. Pt states he drinks 1 quart of vodka daily and his last drink was yesterday around 3 PM. He follows with GI Dr. Drew at [facility] regularly for his cirrhosis. He noticed he was getting jaundiced about 1 month ago. He states the last time this happened his T bili was also elevated just like today. He also endorses falling 1 week ago onto his right hip. He has been able to walk on it but has pain when standing for a long time. Denies shooting pain into his leg, numbness tingling. He endorses chills, difficulty with balance, N, V of clear liquid when he doesn't drink for 2-3 days. He denies current fevers, chills, neck pain, visual changes, CP, SOB, cough, palpitations, abdominal pain, diarrhea, dysuria. He endorses chronic balance difficulties, abdominal distention without pain, yellowing of his skin and eyes,   darkening of his urine, right hip pain.    ED course:  vitals: T 98.5,  --> 79, /74 --> 127/65, RR 18, SpO2 96% RA  significant labs: Hg 8.1 (last 13.2 in 2019), HCT 24.2 (last 37.4 in 2019), Na 129, K+ 3.4, Cl 93, , , T bili 23.5, Blood ETOH 30, PT/INR 42/3.8, U/A large bili, trace ketone, 30 protein, small blood,   imaging: CXR shows Grossly clear lungs  R hip Xray unofficial read: appears grossly normal  EKG: pending in ED (10 Jul 2020 21:51)    HOSPITAL COURSE: Patient was admitted to the ICU after he grew more encephalopathic with an elevated ammonia level and agitated with a CIWA score of 16. He was given ativan and was placed on Librium initially. He was taken off librium with in his first day at the ICU, due to hepatic function labs that included elevated ammonia levels and elevated total bilirubin levels. He was given prednisolone to manage his alcohol hepatitis and placed on a lactulose and rifaximin regimen. To rule out bacterial peritonitis a paracentesis was done, and until cultures were read, the patient was placed on empiric aztreonam - w/u remained neg for SBP and abxs were d/c'd. Pt tx'd for alcoholic hepatitis w/prednisolone given elevated DF. Given IV albumin. Placed on CIWA protocol and treated with ativan/librium. Given Vitamin K for elevated INR. pt hemodynamically improved, mental status improved - pt transferred to Telemetry. Hepatology team/Dr Drew at [facility] called, and requested transfer of patient to their team at [facility], where patient usually follows. Arrangements made via Transfer Center to transfer pt to his hepatology team at [facility], Dr Drew,  for continued management of decompensated liver failure; pt to be admitted to Hospitalist service Dr Amie Madera service.     On arrival to [facility], pt afebrile, HR 66, /72, RR 18, 94% on 2L NC. On questioning he appears somnolent. Is oriented to person and date [place] Denies fever, chills, nausea, vomiting, chest pain, or sob.

## 2020-07-17 NOTE — H&P ADULT - PROBLEM SELECTOR PLAN 2
Due to elevated Maddrey discriminant function, continue with prednisolone. plan for daily 26days, then taper: 30mg x1wk, 20mg x 1wk, 10mg x 1wk  started on Appears volume overloaded also with diffuse rhonchi, and now requiring NC.   -CXR ordered  -Chest PT  -giving Lasix 40 IVP x1 as he has not received diuretic today    #Asthma  -ventolin HFA ordered Appears volume overloaded also with diffuse rhonchi, and now requiring NC.   -CXR ordered  -Chest PT    #Asthma  -ventolin HFA ordered

## 2020-07-17 NOTE — PROGRESS NOTE ADULT - ASSESSMENT
Assessment and Recommendation:    Problem/Recommendation - 1:  Problem: Alcoholic hepatitis with ascites  am labs pending  sp dx tap; saag cw phtn, cx ngtd and cyto neg  cont prednisolone for high df   cont rifaximin and lactulose- titrate for 3 soft bms/day  cont pepcid, propanolol w parameters, diuretics per primary  avoid hepatotoxins; monitor renal fxn; daily meld labs   low na/pro diet as tolerated  monitor gi function  to follow       Problem/Recommendation - 2:  ·  Problem: Liver failure, acute.  Recommendation: as above  decompensated cirrhosis       Problem/Recommendation - 3:  ·  Problem: Esophageal varices with bleeding.  Recommendation:   no s/s active gib  continue BB     Problem/Recommendation - 4:  ·  Problem: ETOH abuse.  Recommendation: CIWA protocol

## 2020-07-17 NOTE — PROGRESS NOTE ADULT - ATTENDING COMMENTS
50M, etoh abuse/seizures, liver cirrhosis c/b esoph varices/banding, ascites, IBS; mgmt for decompensated liver failure, etoh w/dwl syndrome - s/p ICU->trans floor 7/15 for continued optimization     acute hepatitis/jaundice in setting of etoh abuse   -continues on prednisolone per GI recomms - plan for daily 26days, then taper: 30mg x1wk, 20mg x 1wk, 10mg x 1wk  -Tbili remain 20s range; transaminases stable    decompensated hepatic cirrhosis w/liver failure  -AMS/hepatic encephalopathy - improved mental status, though speech slow - continues on lactulose, rifaximin  -anemia/thrombocytopenia - no ann marie e/o active bleeding - trending counts hgb stable 7-8s range, platelets 70s-80s currently; prn supportive transfusions  -coagulopathy - elev INR to peak 4s-> remains in 3s range currently - monitoring  -portal HTN - on propranolol inhouse as hemodynamics permit ->to resume nadolol as o/p  -r/o'd SBP - neg eval, abxs off    hypoTN in setting of liver failure, volume depletion - improved hemodynamics  -continue holding norvasc, spironolactone  -s/p intermittent doses of IV albumin prn    etoh w/dwl, etoh abuse w/hx seizures  -completed librium taper  -continue vitamin/mineral supplementation  -not on antiepileptics  -encourage cessation of etoh    dvt prophy - LE dopplers NEG for DVT

## 2020-07-17 NOTE — PROGRESS NOTE ADULT - SUBJECTIVE AND OBJECTIVE BOX
INTERVAL HPI/OVERNIGHT EVENTS:  pt seen and examined  resting in bed, easily arousable  denies abd pain  tolerating diet  +non bloody bms per nursing    MEDICATIONS  (STANDING):  enoxaparin Injectable 40 milliGRAM(s) SubCutaneous daily  ergocalciferol 09150 Unit(s) Oral every week  famotidine    Tablet 20 milliGRAM(s) Oral two times a day  folic acid 1 milliGRAM(s) Oral daily  furosemide    Tablet 20 milliGRAM(s) Oral daily  lactulose Syrup 20 Gram(s) Oral four times a day  magnesium oxide 400 milliGRAM(s) Oral daily  multivitamin 1 Tablet(s) Oral daily  prednisoLONE    3 mG/mL Solution (ORAPRED) 40 milliGRAM(s) Oral daily  propranolol 10 milliGRAM(s) Oral every 12 hours  rifAXIMin 550 milliGRAM(s) Oral two times a day  thiamine 100 milliGRAM(s) Oral daily    MEDICATIONS  (PRN):  LORazepam   Injectable 1 milliGRAM(s) IV Push every 2 hours PRN Symptom-triggered: 2 point increase in CIWA -Ar score and a total score of 7 or LESS  ondansetron Injectable 4 milliGRAM(s) IV Push every 6 hours PRN Nausea      Allergies    penicillin (Unknown)    Intolerances        Review of Systems:    see above unable to obtain in entirety     Vital Signs Last 24 Hrs  T(C): 36.8 (17 Jul 2020 07:32), Max: 36.8 (16 Jul 2020 12:31)  T(F): 98.3 (17 Jul 2020 07:32), Max: 98.3 (17 Jul 2020 07:32)  HR: 68 (17 Jul 2020 07:32) (56 - 68)  BP: 114/74 (17 Jul 2020 07:32) (103/61 - 114/74)  BP(mean): --  RR: 17 (17 Jul 2020 07:32) (16 - 19)  SpO2: 99% (17 Jul 2020 07:32) (92% - 99%)    PHYSICAL EXAM:      Constitutional: lying in bed  HEENT: ncat  Gastrointestinal: soft nt +dt  Extremities: mild edema  Vascular: + peripheral pulses  Neurological: lethargic but arousable, appropriate, periods of confusion  Skin: jaundiced, scattered ecchymosis    LABS:                        7.9    9.62  )-----------( 82       ( 16 Jul 2020 07:01 )             24.8     07-16    140  |  104  |  27<H>  ----------------------------<  82  4.0   |  32<H>  |  1.10    Ca    8.3<L>      16 Jul 2020 07:01  Phos  3.7     07-16  Mg     2.3     07-16    TPro  5.9<L>  /  Alb  1.6<L>  /  TBili  21.4<HH>  /  DBili  x   /  AST  85<H>  /  ALT  42  /  AlkPhos  103  07-16    PT/INR - ( 16 Jul 2020 07:01 )   PT: 36.3 sec;   INR: 3.28 ratio               RADIOLOGY & ADDITIONAL TESTS:

## 2020-07-17 NOTE — H&P ADULT - PROBLEM SELECTOR PLAN 7
SCDs due to bleeding risk  Diet: low K and low protein SCDs for now because no CBC from today, can likely transition back to lovenox tomorrow. had been on lovenox at OSH.   Diet: low K and low protein DVT: HSQ  Diet: low K and low protein

## 2020-07-17 NOTE — H&P ADULT - PROBLEM SELECTOR PLAN 3
Chronic ETOH abuse since for >20 years with recent admission to [facility] ICU for withdrawal. Drinks 1 quart vodka daily, last drink was 7/9.   -symptom triggered CIWA with Ativan Elevated Maddrey discriminant function.   -continue with prednisolone. plan for daily 26days, then taper: 30mg x1wk, 20mg x 1wk, 10mg x 1wk  started on 7/13.

## 2020-07-17 NOTE — H&P ADULT - PROBLEM SELECTOR PLAN 4
In setting of liver failure. No reported bleeding events.   -maintain T&S  -transfuse if Hg <7 Chronic ETOH abuse since for >20 years with recent admission to [facility] ICU for withdrawal. Drinks 1 quart vodka daily, last drink was 7/9.   -symptom triggered CIWA with Ativan

## 2020-07-17 NOTE — H&P ADULT - PROBLEM SELECTOR PLAN 5
S/p bleeding esophageal varices banding in 2017  -cw propranolol with hold parameters In setting of liver failure. No reported bleeding events.   -maintain T&S  -transfuse if Hg <7

## 2020-07-17 NOTE — H&P ADULT - ASSESSMENT
49 y/o M with a PMHx of alcohol dependence, liver cirrhosis, esophageal varices s/p banding in 2017, ascites s/p paracentesis 1 year ago, IBS, transferred to [facility] for acute on chronic liver failure, and liver transplant evaluation.

## 2020-07-17 NOTE — PROGRESS NOTE ADULT - SUBJECTIVE AND OBJECTIVE BOX
CC/F/U for: etoh w/dwl, hypoTN    HPI:  49 y/o M with a PMHx of alcohol dependence, liver cirrhosis, esophageal varices s/p banding in 2017, ascites s/p paracentesis 1 year ago, IBS, who comes in for detox and jaundice x 1 month. He states he relapsed in April after successfully quitting for 6 months previously. He has been admitted for detox multiple times in the past, in 2014 he was admitted at [facility] and 2017 at [facility]. He denies seizures in the past from withdrawal however he does endorse shaking, N, V when he doesnt drinks for 2-3 days. Pt states he drinks 1 quart of vodka daily and his last drink was yesterday around 3 PM. He follows with GI Dr. Drew at [facility] regularly for his cirrhosis. He noticed he was getting jaundiced about 1 month ago. He states the last time this happened his T bili was also elevated just like today. He also endorses falling 1 week ago onto his right hip. He has been able to walk on it but has pain when standing for a long time. Denies shooting pain into his leg, numbness tingling. He endorses chills, difficulty with balance, N, V of clear liquid when he doesn't drink for 2-3 days. He denies current fevers, chills, neck pain, visual changes, CP, SOB, cough, palpitations, abdominal pain, diarrhea, dysuria. He endorses chronic balance difficulties, abdominal distention without pain, yellowing of his skin and eyes,   darkening of his urine, right hip pain.    ED course:  vitals: T 98.5,  --> 79, /74 --> 127/65, RR 18, SpO2 96% RA  significant labs: Hg 8.1 (last 13.2 in 2019), HCT 24.2 (last 37.4 in 2019), Na 129, K+ 3.4, Cl 93, , , T bili 23.5, Blood ETOH 30, PT/INR 42/3.8, U/A large bili, trace ketone, 30 protein, small blood,   imaging: CXR shows Grossly clear lungs  R hip Xray unofficial read: appears grossly normal  EKG: pending in ED (10 Jul 2020 21:51)        INTERVAL HPI/OVERNIGHT EVENTS:  Pt seen and examined at bedside - Tbili remains 20s; BP measures improved.     Allergies/Intolerance: penicillin (Unknown)      MEDICATIONS  (STANDING):  enoxaparin Injectable 40 milliGRAM(s) SubCutaneous daily  ergocalciferol 91395 Unit(s) Oral every week  famotidine    Tablet 20 milliGRAM(s) Oral two times a day  folic acid 1 milliGRAM(s) Oral daily  furosemide    Tablet 20 milliGRAM(s) Oral daily  lactulose Syrup 20 Gram(s) Oral four times a day  magnesium oxide 400 milliGRAM(s) Oral daily  multivitamin 1 Tablet(s) Oral daily  prednisoLONE    3 mG/mL Solution (ORAPRED) 40 milliGRAM(s) Oral daily  propranolol 10 milliGRAM(s) Oral every 12 hours  rifAXIMin 550 milliGRAM(s) Oral two times a day  thiamine 100 milliGRAM(s) Oral daily    MEDICATIONS  (PRN):  LORazepam   Injectable 1 milliGRAM(s) IV Push every 2 hours PRN Symptom-triggered: 2 point increase in CIWA -Ar score and a total score of 7 or LESS  ondansetron Injectable 4 milliGRAM(s) IV Push every 6 hours PRN Nausea      ROS: as above; all other systems reviewed and wnl      PHYSICAL EXAMINATION:  Vital Signs Last 24 Hrs  T(C): 36.8 (17 Jul 2020 07:32), Max: 36.8 (16 Jul 2020 12:31)  T(F): 98.3 (17 Jul 2020 07:32), Max: 98.3 (17 Jul 2020 07:32)  HR: 68 (17 Jul 2020 07:32) (56 - 68)  BP: 114/74 (17 Jul 2020 07:32) (103/61 - 114/74)  RR: 17 (17 Jul 2020 07:32) (16 - 19)  SpO2: 99% (17 Jul 2020 07:32) (92% - 99%)    GENERAL: jaundiced, middle-aged male, NAD  HEENT: mucous membranes dry  CHEST: respirations unlabored  HEART: HR 60s  ABDOMEN: soft, distended, NT   EXTREMITIES: +edema b/l LEs, no calf tenderness  NEURO: awake, alert, interactive; speech slow      LABS:                        7.9    9.62  )-----------( 82       ( 16 Jul 2020 07:01 )             24.8     07-16    140  |  104  |  27<H>  ----------------------------<  82  4.0   |  32<H>  |  1.10    Ca    8.3<L>      16 Jul 2020 07:01  Phos  3.7     07-16  Mg     2.3     07-16    TPro  5.9<L>  /  Alb  1.6<L>  /  TBili  21.4<HH>  /  DBili  x   /  AST  85<H>  /  ALT  42  /  AlkPhos  103  07-16    PT/INR - ( 16 Jul 2020 07:01 )   PT: 36.3 sec;   INR: 3.28 ratio

## 2020-07-17 NOTE — PROGRESS NOTE ADULT - NSHPATTENDINGPLANDISCUSS_GEN_ALL_CORE
RN, pt; yesterday, discussed all diagnoses, mgmt plans w/sister Ellie by phone at bedside; all questions answered comprehensively RN, pt; all diagnoses, mgmt plans discussed in detail w/sister Ellie by phone [phone]; all questions answered comprehensively

## 2020-07-17 NOTE — PROGRESS NOTE ADULT - PROVIDER SPECIALTY LIST ADULT
Addiction Medicine
Critical Care
Gastroenterology
Hospitalist
Pulmonology
Critical Care
Gastroenterology
Addiction Medicine
Hospitalist
Hospitalist

## 2020-07-17 NOTE — H&P ADULT - NSHPLABSRESULTS_GEN_ALL_CORE
LABS:                        7.9    9.62  )-----------( 82       ( 16 Jul 2020 07:01 )             24.8     07-16    140  |  104  |  27<H>  ----------------------------<  82  4.0   |  32<H>  |  1.10    Ca    8.3<L>      16 Jul 2020 07:01  Phos  3.7     07-16  Mg     2.3     07-16    TPro  5.9<L>  /  Alb  1.6<L>  /  TBili  21.4<HH>  /  DBili  x   /  AST  85<H>  /  ALT  42  /  AlkPhos  103  07-16    PT/INR - ( 16 Jul 2020 07:01 )   PT: 36.3 sec;   INR: 3.28 ratio                     RADIOLOGY & ADDITIONAL TESTS:  Results Reviewed: LABS:                        7.9    9.62  )-----------( 82       ( 16 Jul 2020 07:01 )             24.8     07-16    140  |  104  |  27<H>  ----------------------------<  82  4.0   |  32<H>  |  1.10    Ca    8.3<L>      16 Jul 2020 07:01  Phos  3.7     07-16  Mg     2.3     07-16    TPro  5.9<L>  /  Alb  1.6<L>  /  TBili  21.4<HH>  /  DBili  x   /  AST  85<H>  /  ALT  42  /  AlkPhos  103  07-16    PT/INR - ( 16 Jul 2020 07:01 )   PT: 36.3 sec;   INR: 3.28 ratio      I have reviewed the labs, imaging and ekg                 RADIOLOGY & ADDITIONAL TESTS:  Results Reviewed:

## 2020-07-17 NOTE — H&P ADULT - NSHPSOCIALHISTORY_GEN_ALL_CORE
Current smoker since 19 y.o. 1/2 PPD  EtOH: chronic, 1 quart vodka daily  Recreational drug use: denies  Lives with: mother and sister  Ambulates: independently   ADLs: independently, help from family

## 2020-07-17 NOTE — H&P ADULT - NSHPPHYSICALEXAM_GEN_ALL_CORE
Vital Signs Last 24 Hrs  T(C): 36.6 (17 Jul 2020 22:08), Max: 36.8 (17 Jul 2020 07:32)  T(F): 97.9 (17 Jul 2020 22:08), Max: 98.3 (17 Jul 2020 07:32)  HR: 66 (17 Jul 2020 22:08) (58 - 68)  BP: 111/72 (17 Jul 2020 22:08) (103/61 - 117/71)  BP(mean): --  RR: 18 (17 Jul 2020 22:08) (17 - 19)  SpO2: 94% (17 Jul 2020 22:08) (92% - 99%)    PHYSICAL EXAM:  GENERAL: NAD, lying in bed comfortably  HEAD:  Atraumatic, Normocephalic  EYES: EOMI, PERRLA, conjunctiva and sclera clear  ENT: Moist mucous membranes  NECK: Supple, No JVD  CHEST/LUNG: Clear to auscultation bilaterally; No rales, rhonchi, wheezing, or rubs. Unlabored respirations  HEART: Regular rate and rhythm; No murmurs, rubs, or gallops  ABDOMEN: Bowel sounds present; Soft, Nontender, Nondistended. No hepatomegally  EXTREMITIES:  2+ Peripheral Pulses, brisk capillary refill. No clubbing, cyanosis, or edema  NERVOUS SYSTEM:  Alert & Oriented X3, speech clear. No deficits   MSK: FROM all 4 extremities, full and equal strength  SKIN: No rashes or lesions Vital Signs Last 24 Hrs  T(C): 36.6 (17 Jul 2020 22:08), Max: 36.8 (17 Jul 2020 07:32)  T(F): 97.9 (17 Jul 2020 22:08), Max: 98.3 (17 Jul 2020 07:32)  HR: 66 (17 Jul 2020 22:08) (58 - 68)  BP: 111/72 (17 Jul 2020 22:08) (103/61 - 117/71)  BP(mean): --  RR: 18 (17 Jul 2020 22:08) (17 - 19)  SpO2: 94% (17 Jul 2020 22:08) (92% - 99%)    PHYSICAL EXAM:  GENERAL: middle aged male, extremely jaundiced. lethargic   HEAD:  Atraumatic, Normocephalic  EYES: EOMI, PERRLA, scleral icterus   ENT: Moist mucous membranes  NECK: Supple, No JVD  CHEST/LUNG: diffuse rhonchi in all lung fields. on 2L NC  HEART: Regular rate and rhythm; No murmurs, rubs, or gallops  ABDOMEN: Bowel sounds present; Soft, Nontender, Nondistended. No hepatomegaly  EXTREMITIES: 2+ pitting edema to bilateral knees, R>L   NERVOUS SYSTEM:  Alert & Oriented X2, No focal deficits   MSK: FROM all 4 extremities, full and equal strength  SKIN: +jaundice

## 2020-07-17 NOTE — H&P ADULT - ATTENDING COMMENTS
I have reviewed the labs, imaging and ekg. I have seen and examined the patient. I agree with above unless otherwise stated below  50M with a PMHx of alcohol dependence, liver cirrhosis, esophageal varices s/p banding in 2017, ascites s/p paracentesis 1 year ago, IBS, transferred to [facility] for acute on chronic liver failure, and liver transplant evaluation. MELD Score 32 on arrival before labs drawn  -C/w lasix and spironolactone  -Trend vital signs  -Cont. cipro for SBP prophylaxis  -Contact hepatology and transplant in AM  -Cont. vit. K for now, f/u with hepatology  -Cont. with propranolol  -Trend cbc  -Trend plts, note pt on Hep subq for DVT prophylaxis

## 2020-07-17 NOTE — H&P ADULT - PROBLEM SELECTOR PLAN 6
SCDs due to bleeding risk S/p bleeding esophageal varices banding in 2017  -cw propranolol with hold parameters

## 2020-07-17 NOTE — H&P ADULT - NSHPREVIEWOFSYSTEMS_GEN_ALL_CORE
CONSTITUTIONAL: No weakness, fevers or chills  EYES/ENT: No visual changes;  No vertigo or throat pain   NECK: No pain or stiffness  RESPIRATORY: No cough, wheezing, hemoptysis; No shortness of breath  CARDIOVASCULAR: No chest pain or palpitations  GASTROINTESTINAL: No abdominal or epigastric pain. No nausea, vomiting, or hematemesis; No diarrhea or constipation. No melena or hematochezia.  GENITOURINARY: No dysuria, frequency or hematuria  NEUROLOGICAL: No flaccid paralysis  SKIN: No itching, rashes  MSK: no joint deformities  PSYCH: no SI/HI

## 2020-07-18 DIAGNOSIS — R09.02 HYPOXEMIA: ICD-10-CM

## 2020-07-18 LAB
ALBUMIN SERPL ELPH-MCNC: 2.5 G/DL — LOW (ref 3.3–5)
ALBUMIN SERPL ELPH-MCNC: 2.6 G/DL — LOW (ref 3.3–5)
ALP SERPL-CCNC: 86 U/L — SIGNIFICANT CHANGE UP (ref 40–120)
ALP SERPL-CCNC: 89 U/L — SIGNIFICANT CHANGE UP (ref 40–120)
ALT FLD-CCNC: 47 U/L — HIGH (ref 10–45)
ALT FLD-CCNC: 48 U/L — HIGH (ref 10–45)
ANION GAP SERPL CALC-SCNC: 10 MMOL/L — SIGNIFICANT CHANGE UP (ref 5–17)
ANION GAP SERPL CALC-SCNC: 10 MMOL/L — SIGNIFICANT CHANGE UP (ref 5–17)
ANISOCYTOSIS BLD QL: SLIGHT — SIGNIFICANT CHANGE UP
APTT BLD: 50.1 SEC — HIGH (ref 27.5–35.5)
AST SERPL-CCNC: 96 U/L — HIGH (ref 10–40)
AST SERPL-CCNC: 99 U/L — HIGH (ref 10–40)
BASOPHILS # BLD AUTO: 0 K/UL — SIGNIFICANT CHANGE UP (ref 0–0.2)
BASOPHILS NFR BLD AUTO: 0 % — SIGNIFICANT CHANGE UP (ref 0–2)
BILIRUB SERPL-MCNC: 25.6 MG/DL — HIGH (ref 0.2–1.2)
BILIRUB SERPL-MCNC: 25.8 MG/DL — HIGH (ref 0.2–1.2)
BLD GP AB SCN SERPL QL: NEGATIVE — SIGNIFICANT CHANGE UP
BUN SERPL-MCNC: 26 MG/DL — HIGH (ref 7–23)
BUN SERPL-MCNC: 27 MG/DL — HIGH (ref 7–23)
CALCIUM SERPL-MCNC: 8.9 MG/DL — SIGNIFICANT CHANGE UP (ref 8.4–10.5)
CALCIUM SERPL-MCNC: 9.2 MG/DL — SIGNIFICANT CHANGE UP (ref 8.4–10.5)
CHLORIDE SERPL-SCNC: 100 MMOL/L — SIGNIFICANT CHANGE UP (ref 96–108)
CHLORIDE SERPL-SCNC: 103 MMOL/L — SIGNIFICANT CHANGE UP (ref 96–108)
CO2 SERPL-SCNC: 24 MMOL/L — SIGNIFICANT CHANGE UP (ref 22–31)
CO2 SERPL-SCNC: 25 MMOL/L — SIGNIFICANT CHANGE UP (ref 22–31)
CREAT SERPL-MCNC: 0.67 MG/DL — SIGNIFICANT CHANGE UP (ref 0.5–1.3)
CREAT SERPL-MCNC: 0.7 MG/DL — SIGNIFICANT CHANGE UP (ref 0.5–1.3)
DACRYOCYTES BLD QL SMEAR: SLIGHT — SIGNIFICANT CHANGE UP
ELLIPTOCYTES BLD QL SMEAR: SLIGHT — SIGNIFICANT CHANGE UP
EOSINOPHIL # BLD AUTO: 0.1 K/UL — SIGNIFICANT CHANGE UP (ref 0–0.5)
EOSINOPHIL NFR BLD AUTO: 1 % — SIGNIFICANT CHANGE UP (ref 0–6)
GLUCOSE SERPL-MCNC: 102 MG/DL — HIGH (ref 70–99)
GLUCOSE SERPL-MCNC: 118 MG/DL — HIGH (ref 70–99)
HCT VFR BLD CALC: 26.2 % — LOW (ref 39–50)
HCT VFR BLD CALC: 27.8 % — LOW (ref 39–50)
HGB BLD-MCNC: 8.1 G/DL — LOW (ref 13–17)
HGB BLD-MCNC: 8.5 G/DL — LOW (ref 13–17)
INR BLD: 2.85 RATIO — HIGH (ref 0.88–1.16)
LYMPHOCYTES # BLD AUTO: 0.49 K/UL — LOW (ref 1–3.3)
LYMPHOCYTES # BLD AUTO: 5 % — LOW (ref 13–44)
MACROCYTES BLD QL: SIGNIFICANT CHANGE UP
MAGNESIUM SERPL-MCNC: 2.3 MG/DL — SIGNIFICANT CHANGE UP (ref 1.6–2.6)
MAGNESIUM SERPL-MCNC: 2.3 MG/DL — SIGNIFICANT CHANGE UP (ref 1.6–2.6)
MANUAL SMEAR VERIFICATION: SIGNIFICANT CHANGE UP
MCHC RBC-ENTMCNC: 30.6 GM/DL — LOW (ref 32–36)
MCHC RBC-ENTMCNC: 30.9 GM/DL — LOW (ref 32–36)
MCHC RBC-ENTMCNC: 39 PG — HIGH (ref 27–34)
MCHC RBC-ENTMCNC: 39.1 PG — HIGH (ref 27–34)
MCV RBC AUTO: 126.6 FL — HIGH (ref 80–100)
MCV RBC AUTO: 127.5 FL — HIGH (ref 80–100)
METAMYELOCYTES # FLD: 1 % — HIGH (ref 0–0)
MICROCYTES BLD QL: SLIGHT — SIGNIFICANT CHANGE UP
MONOCYTES # BLD AUTO: 1.27 K/UL — HIGH (ref 0–0.9)
MONOCYTES NFR BLD AUTO: 13 % — SIGNIFICANT CHANGE UP (ref 2–14)
MYELOCYTES NFR BLD: 2 % — HIGH (ref 0–0)
NEUTROPHILS # BLD AUTO: 7.52 K/UL — HIGH (ref 1.8–7.4)
NEUTROPHILS NFR BLD AUTO: 76 % — SIGNIFICANT CHANGE UP (ref 43–77)
NEUTS BAND # BLD: 1 % — SIGNIFICANT CHANGE UP (ref 0–8)
NRBC # BLD: 0 /100 WBCS — SIGNIFICANT CHANGE UP (ref 0–0)
NRBC # BLD: 0 /100 WBCS — SIGNIFICANT CHANGE UP (ref 0–0)
NRBC # BLD: 2 /100 — HIGH (ref 0–0)
PHOSPHATE SERPL-MCNC: 2.9 MG/DL — SIGNIFICANT CHANGE UP (ref 2.5–4.5)
PHOSPHATE SERPL-MCNC: 3.1 MG/DL — SIGNIFICANT CHANGE UP (ref 2.5–4.5)
PLAT MORPH BLD: NORMAL — SIGNIFICANT CHANGE UP
PLATELET # BLD AUTO: 70 K/UL — LOW (ref 150–400)
PLATELET # BLD AUTO: 76 K/UL — LOW (ref 150–400)
POLYCHROMASIA BLD QL SMEAR: SLIGHT — SIGNIFICANT CHANGE UP
POTASSIUM SERPL-MCNC: 4 MMOL/L — SIGNIFICANT CHANGE UP (ref 3.5–5.3)
POTASSIUM SERPL-MCNC: 4.1 MMOL/L — SIGNIFICANT CHANGE UP (ref 3.5–5.3)
POTASSIUM SERPL-SCNC: 4 MMOL/L — SIGNIFICANT CHANGE UP (ref 3.5–5.3)
POTASSIUM SERPL-SCNC: 4.1 MMOL/L — SIGNIFICANT CHANGE UP (ref 3.5–5.3)
PROT SERPL-MCNC: 6.5 G/DL — SIGNIFICANT CHANGE UP (ref 6–8.3)
PROT SERPL-MCNC: 6.7 G/DL — SIGNIFICANT CHANGE UP (ref 6–8.3)
PROTHROM AB SERPL-ACNC: 32.3 SEC — HIGH (ref 10.6–13.6)
RBC # BLD: 2.07 M/UL — LOW (ref 4.2–5.8)
RBC # BLD: 2.18 M/UL — LOW (ref 4.2–5.8)
RBC # FLD: 17.3 % — HIGH (ref 10.3–14.5)
RBC # FLD: 17.3 % — HIGH (ref 10.3–14.5)
RBC BLD AUTO: ABNORMAL
RH IG SCN BLD-IMP: POSITIVE — SIGNIFICANT CHANGE UP
SCHISTOCYTES BLD QL AUTO: SLIGHT — SIGNIFICANT CHANGE UP
SODIUM SERPL-SCNC: 134 MMOL/L — LOW (ref 135–145)
SODIUM SERPL-SCNC: 138 MMOL/L — SIGNIFICANT CHANGE UP (ref 135–145)
TARGETS BLD QL SMEAR: SLIGHT — SIGNIFICANT CHANGE UP
VARIANT LYMPHS # BLD: 1 % — SIGNIFICANT CHANGE UP (ref 0–6)
WBC # BLD: 9.71 K/UL — SIGNIFICANT CHANGE UP (ref 3.8–10.5)
WBC # BLD: 9.76 K/UL — SIGNIFICANT CHANGE UP (ref 3.8–10.5)
WBC # FLD AUTO: 9.71 K/UL — SIGNIFICANT CHANGE UP (ref 3.8–10.5)
WBC # FLD AUTO: 9.76 K/UL — SIGNIFICANT CHANGE UP (ref 3.8–10.5)

## 2020-07-18 PROCEDURE — 71045 X-RAY EXAM CHEST 1 VIEW: CPT | Mod: 26

## 2020-07-18 PROCEDURE — 99233 SBSQ HOSP IP/OBS HIGH 50: CPT

## 2020-07-18 PROCEDURE — 99223 1ST HOSP IP/OBS HIGH 75: CPT | Mod: GC

## 2020-07-18 RX ORDER — SPIRONOLACTONE 25 MG/1
50 TABLET, FILM COATED ORAL DAILY
Refills: 0 | Status: DISCONTINUED | OUTPATIENT
Start: 2020-07-18 | End: 2020-07-22

## 2020-07-18 RX ORDER — HEPARIN SODIUM 5000 [USP'U]/ML
5000 INJECTION INTRAVENOUS; SUBCUTANEOUS EVERY 12 HOURS
Refills: 0 | Status: DISCONTINUED | OUTPATIENT
Start: 2020-07-18 | End: 2020-07-19

## 2020-07-18 RX ORDER — FUROSEMIDE 40 MG
20 TABLET ORAL DAILY
Refills: 0 | Status: DISCONTINUED | OUTPATIENT
Start: 2020-07-18 | End: 2020-07-18

## 2020-07-18 RX ORDER — FUROSEMIDE 40 MG
20 TABLET ORAL DAILY
Refills: 0 | Status: DISCONTINUED | OUTPATIENT
Start: 2020-07-18 | End: 2020-07-22

## 2020-07-18 RX ORDER — LACTULOSE 10 G/15ML
20 SOLUTION ORAL EVERY 4 HOURS
Refills: 0 | Status: DISCONTINUED | OUTPATIENT
Start: 2020-07-18 | End: 2020-07-22

## 2020-07-18 RX ORDER — FUROSEMIDE 40 MG
40 TABLET ORAL ONCE
Refills: 0 | Status: DISCONTINUED | OUTPATIENT
Start: 2020-07-18 | End: 2020-07-18

## 2020-07-18 RX ORDER — CIPROFLOXACIN LACTATE 400MG/40ML
500 VIAL (ML) INTRAVENOUS DAILY
Refills: 0 | Status: DISCONTINUED | OUTPATIENT
Start: 2020-07-18 | End: 2020-07-22

## 2020-07-18 RX ORDER — PREDNISOLONE 5 MG
40 TABLET ORAL DAILY
Refills: 0 | Status: DISCONTINUED | OUTPATIENT
Start: 2020-07-18 | End: 2020-07-21

## 2020-07-18 RX ORDER — PHYTONADIONE (VIT K1) 5 MG
5 TABLET ORAL DAILY
Refills: 0 | Status: COMPLETED | OUTPATIENT
Start: 2020-07-18 | End: 2020-07-20

## 2020-07-18 RX ADMIN — Medication 40 MILLIGRAM(S): at 05:47

## 2020-07-18 RX ADMIN — HEPARIN SODIUM 5000 UNIT(S): 5000 INJECTION INTRAVENOUS; SUBCUTANEOUS at 17:24

## 2020-07-18 RX ADMIN — Medication 100 MILLIGRAM(S): at 11:45

## 2020-07-18 RX ADMIN — MAGNESIUM OXIDE 400 MG ORAL TABLET 400 MILLIGRAM(S): 241.3 TABLET ORAL at 11:46

## 2020-07-18 RX ADMIN — Medication 1 MILLIGRAM(S): at 11:46

## 2020-07-18 RX ADMIN — HEPARIN SODIUM 5000 UNIT(S): 5000 INJECTION INTRAVENOUS; SUBCUTANEOUS at 05:47

## 2020-07-18 RX ADMIN — FAMOTIDINE 20 MILLIGRAM(S): 10 INJECTION INTRAVENOUS at 11:46

## 2020-07-18 RX ADMIN — Medication 20 MILLIGRAM(S): at 05:47

## 2020-07-18 RX ADMIN — Medication 500 MILLIGRAM(S): at 11:46

## 2020-07-18 RX ADMIN — Medication 1 TABLET(S): at 11:46

## 2020-07-18 RX ADMIN — Medication 5 MILLIGRAM(S): at 11:46

## 2020-07-18 RX ADMIN — SPIRONOLACTONE 50 MILLIGRAM(S): 25 TABLET, FILM COATED ORAL at 05:47

## 2020-07-18 NOTE — PROGRESS NOTE ADULT - PROBLEM SELECTOR PLAN 2
Elevated Maddrey discriminant function.   -continue with prednisolone. plan for daily 26days, then taper: 30mg x1wk, 20mg x 1wk, 10mg x 1wk  started on 7/13.

## 2020-07-18 NOTE — CONSULT NOTE ADULT - ASSESSMENT
Impression: 49 year old man with alcohol abuse/dependence with multiple relapse, history of alcoholic hepatitis, decompensated alcoholic cirrhosis c/b variceal bleed, ascites, peripheral edema who presents from OSH with acute alcoholic hepatitis and decompensated cirrhosis in setting of active alcohol use for transplant evaluation.     #Alcoholic hepatitis with hyperbilirubinemia: Secondary to recent alcohol relapse - admits to drinking quart of vodka daily, last drink on 7/9/20. Total bilirubin unchanged since initial presentation. Maddrey's DF score 111.6 on 7/18/20 - started on prednisolone 40 mg daily (day 1).   #Alcohol-related cirrhosis: MELD-Na 30 7/18/20  -Ascites:   -Varices: Most recent EGD 3/2019 with small EV, portal hypertensive gastropathy, duodenopathy.   -HCC: No evidence on US abdomen 10/2019 (> 6 months ago).   -PSE: Newly diagnosed PSE at OSH - encephalopathy now improving on lactulose and rifaximin. On rifaximin alone outpatient.       Recommendations: Impression: 49 year old man with alcohol abuse/dependence with multiple relapse, history of alcoholic hepatitis, decompensated alcoholic cirrhosis c/b variceal bleed, ascites, peripheral edema who presents from OSH with acute alcoholic hepatitis and decompensated cirrhosis in setting of active alcohol use for transplant evaluation.     #Alcoholic hepatitis with hyperbilirubinemia: Secondary to recent alcohol relapse - admits to drinking quart of vodka daily, last drink on 7/9/20.   Total bilirubin markedly elevated but stable since initial presentation. Maddrey's DF score 111.6 on 7/18/20 - started on prednisolone 40 mg daily (day 1).   #Alcohol-related cirrhosis: Decompensated by prior variceal bleed, ascites, peripheral edema and now, PSE. No prior history of SBP. MELD-Na 30 7/18/20  -Ascites:   -Varices: Most recent EGD 3/2019 with Grade II EV, portal hypertensive gastropathy, duodenopathy. On nadolol 40 mg at home.   -HCC: No evidence on US abdomen 10/2019 (> 6 months ago).   -PSE: Newly diagnosed PSE at OSH - encephalopathy now improving on lactulose and rifaximin. On rifaximin alone outpatient.   #Anemia, macrocytic: Suspect multifactorial due to anemia of chronic disease vs. bone marrow suppression from alcohol abuse vs. chronic GI blood loss in setting of known EV; macrocytosis likely secondary to alcohol use. Hb stable since admission to OSH without overt GI bleeding.     Recommendations:  - agree with prednisolone 40 mg daily as infectious cause of hyperbilirubinemia ruled out  - can continue propanolol 10 BID for secondary ppx for variceal re-bleeding  - can continue low dose diuretics - will uptitrate as needed   - agree with oral vitamin K 5 g daily x 3 days for colagulopathy  - lactulose 20 g every 4 hours titrated to improvement in mental status   - continue ciprofloxacin 500 mg daily SBP ppx  - daily INR and CMP for MELD-Na score  - CIWA monitoring per primary team  - Transplant Hepatology to follow      Cami Forman PGY-5  Gastroenterology Fellow  Pager #[phone]  E-mail [email or web address]  Call [phone] to speak to answering service for on-call GI fellow 5pm-7am and weekends. Impression: 49 year old man with alcohol abuse/dependence with multiple relapse, history of alcoholic hepatitis, decompensated alcoholic cirrhosis c/b variceal bleed, ascites, peripheral edema who presents from OSH with acute alcoholic hepatitis and decompensated cirrhosis in setting of active alcohol use for transplant evaluation.     #Alcoholic hepatitis with hyperbilirubinemia: Secondary to recent alcohol relapse - admits to drinking quart of vodka daily, last drink on 7/9/20.   Total bilirubin markedly elevated but stable since initial presentation. Maddrey's DF score 111.6 on 7/18/20 - started on prednisolone 40 mg daily (day 1).   #Alcohol-related cirrhosis: Decompensated by prior variceal bleed, ascites, peripheral edema and now, PSE. No prior history of SBP. MELD-Na 30 7/18/20  -Ascites:   -Varices: Most recent EGD 3/2019 with Grade II EV, portal hypertensive gastropathy, duodenopathy. On nadolol 40 mg at home.   -HCC: No evidence on US abdomen 10/2019 (> 6 months ago).   -PSE: Newly diagnosed PSE at OSH - encephalopathy now improving on lactulose and rifaximin. On rifaximin alone outpatient.   #Anemia, macrocytic: Suspect multifactorial due to anemia of chronic disease vs. bone marrow suppression from alcohol abuse vs. chronic GI blood loss in setting of known EV; macrocytosis likely secondary to alcohol use. Hb stable since admission to OSH without overt GI bleeding.       Recommendations:  - agree with prednisolone 40 mg daily as infectious cause of hyperbilirubinemia ruled out  - please obtain US abdomem with Doppler for HCC/PVT surveillance   - continue propanolol 10 BID as secondary ppx for variceal re-bleeding  - can continue low dose diuretics - will uptitrate as needed   - agree with oral vitamin K 5 g daily x 3 days for colagulopathy  - lactulose 20 g every 4 hours titrated to improvement in mental status   - continue ciprofloxacin 500 mg daily SBP ppx  - daily INR and CMP for MELD-Na score  - CIWA monitoring per primary team  - Transplant Hepatology to follow      Cami Forman PGY-5  Gastroenterology Fellow  Pager #[phone]  E-mail [email or web address]  Call [phone] to speak to answering service for on-call GI fellow 5pm-7am and weekends. Impression: 49 year old man with alcohol abuse/dependence with multiple relapse, history of alcoholic hepatitis, decompensated alcoholic cirrhosis c/b variceal bleed, ascites, peripheral edema who presents from OSH with acute alcoholic hepatitis and decompensated cirrhosis in setting of active alcohol use for transplant evaluation.     #Alcoholic hepatitis with hyperbilirubinemia: Secondary to recent alcohol relapse - admits to drinking quart of vodka daily, last drink on 7/9/20.   Total bilirubin markedly elevated but stable since initial presentation. Maddrey's DF score 111.6 on 7/18/20 - started on prednisolone 40 mg daily (day 1).   #Alcohol-related cirrhosis: Decompensated by prior variceal bleed, ascites, peripheral edema and now, PSE. No prior history of SBP. MELD-Na 30 7/18/20  -Ascites:   -Varices: Most recent EGD 3/2019 with Grade II EV, portal hypertensive gastropathy, duodenopathy. On nadolol 40 mg at home.   -HCC: No evidence on US abdomen 10/2019 (> 6 months ago).   -PSE: Newly diagnosed PSE at OSH - encephalopathy now improving on lactulose and rifaximin. On rifaximin alone outpatient.   #Anemia, macrocytic: Suspect multifactorial due to anemia of chronic disease vs. bone marrow suppression from alcohol abuse vs. chronic GI blood loss in setting of known EV; macrocytosis likely secondary to alcohol use. Hb stable since admission to OSH without overt GI bleeding.       Recommendations:  - agree with prednisolone 40 mg daily as infectious cause of hyperbilirubinemia ruled out  - please obtain US abdomem with Doppler for HCC/PVT surveillance   - continue propanolol 10 BID as secondary ppx for variceal re-bleeding  - can continue low dose diuretics - will uptitrate as needed   - agree with oral vitamin K 5 g daily x 3 days for colagulopathy  - lactulose 20 g every 4 hours titrated to improvement in mental status   - CIWA monitoring per primary team however benzodiazepine use can potentially worsen encephalopathy  - continue ciprofloxacin 500 mg daily SBP ppx  - daily INR and CMP for MELD-Na score  - daily MVI, folate, thiamine  - Transplant Hepatology to follow      Cami Forman PGY-5  Gastroenterology Fellow  Pager #[phone]  E-mail [email or web address]  Call [phone] to speak to answering service for on-call GI fellow 5pm-7am and weekends.

## 2020-07-18 NOTE — PROGRESS NOTE ADULT - SUBJECTIVE AND OBJECTIVE BOX
Patient has no complaints he understands he is here for a transplant eval.    GENERAL: No fevers, no chills.  EYES: No blurry vision,  No photophobia  ENT: No sore throat.  No dysphagia  Cardiovascular: No chest pain, palpitations, orthopnea  Pulmonary: No cough, no wheezing. No shortness of breath  Gastrointestinal: No abdominal pain, no diarrhea, no constipation.   Musculoskeletal: No weakness.  No myalgias.  Dermatology:  No rashes.  Neuro: No Headache.  No vertigo.  No dizziness.  Psych: No anxiety, no depression.  Denies suicidal thoughts.    MEDICATIONS  (STANDING):  ciprofloxacin     Tablet 500 milliGRAM(s) Oral daily  famotidine    Tablet 20 milliGRAM(s) Oral daily  folic acid 1 milliGRAM(s) Oral daily  furosemide    Tablet 20 milliGRAM(s) Oral daily  heparin   Injectable 5000 Unit(s) SubCutaneous every 12 hours  lactulose Syrup 20 Gram(s) Oral four times a day  magnesium oxide 400 milliGRAM(s) Oral daily  multivitamin 1 Tablet(s) Oral daily  phytonadione   Solution 5 milliGRAM(s) Oral daily  prednisoLONE    3 mG/mL Solution (ORAPRED) 40 milliGRAM(s) Oral daily  propranolol 10 milliGRAM(s) Oral every 12 hours  rifAXIMin 550 milliGRAM(s) Oral two times a day  spironolactone 50 milliGRAM(s) Oral daily  thiamine 100 milliGRAM(s) Oral daily    MEDICATIONS  (PRN):  LORazepam   Injectable 1 milliGRAM(s) IV Push every 2 hours PRN CIWA-Ar score increase by 2 points and a total score of 7 or less  LORazepam   Injectable 1 milliGRAM(s) IV Push every 1 hour PRN CIWA-Ar score 8 or greater  ondansetron Injectable 8 milliGRAM(s) IV Push two times a day PRN Nausea and/or Vomiting    Vital Signs Last 24 Hrs  T(C): 36.7 (18 Jul 2020 06:19), Max: 37.1 (18 Jul 2020 05:00)  T(F): 98 (18 Jul 2020 06:19), Max: 98.7 (18 Jul 2020 05:00)  HR: 56 (18 Jul 2020 06:19) (56 - 66)  BP: 125/69 (18 Jul 2020 06:19) (111/72 - 125/69)  BP(mean): --  RR: 18 (18 Jul 2020 06:19) (17 - 18)  SpO2: 98% (18 Jul 2020 06:19) (94% - 98%)    GENERAL: NAD, obese  HEAD:  Atraumatic, Normocephalic  EYES: scleral icterius  ENT: Pharynx not erythematous  PULMONARY: Clear to auscultation bilaterally; No wheeze  CARDIOVASCULAR: Regular rate and rhythm; No murmurs, rubs, or gallops  ABDOMEN: Soft, Nontender, + distension; Bowel sounds present  EXTREMITIES:  2+ Peripheral Pulses, No clubbing, cyanosis, or edema  MUSCULOSKELETAL: No calf tenderness  PSYCH: AAOx3, normal affect  SKIN: warm and dry, No rashes or lesions    .  LABS:                         8.5    9.71  )-----------( 70       ( 18 Jul 2020 07:01 )             27.8     07-18    138  |  103  |  26<H>  ----------------------------<  102<H>  4.0   |  25  |  0.67    Ca    8.9      18 Jul 2020 07:01  Phos  2.9     07-18  Mg     2.3     07-18    TPro  6.5  /  Alb  2.5<L>  /  TBili  25.6<H>  /  DBili  x   /  AST  96<H>  /  ALT  48<H>  /  AlkPhos  89  07-18    PT/INR - ( 18 Jul 2020 01:22 )   PT: 32.3 sec;   INR: 2.85 ratio         PTT - ( 18 Jul 2020 01:22 )  PTT:50.1 sec          RADIOLOGY, EKG & ADDITIONAL TESTS: Reviewed.

## 2020-07-18 NOTE — PROGRESS NOTE ADULT - SUBJECTIVE AND OBJECTIVE BOX
INTERVAL HPI/OVERNIGHT EVENTS:  pt seen and examined  resting in bed,   transferred to NS       MEDICATIONS  (STANDING):  enoxaparin Injectable 40 milliGRAM(s) SubCutaneous daily  ergocalciferol 13890 Unit(s) Oral every week  famotidine    Tablet 20 milliGRAM(s) Oral two times a day  folic acid 1 milliGRAM(s) Oral daily  furosemide    Tablet 20 milliGRAM(s) Oral daily  lactulose Syrup 20 Gram(s) Oral four times a day  magnesium oxide 400 milliGRAM(s) Oral daily  multivitamin 1 Tablet(s) Oral daily  prednisoLONE    3 mG/mL Solution (ORAPRED) 40 milliGRAM(s) Oral daily  propranolol 10 milliGRAM(s) Oral every 12 hours  rifAXIMin 550 milliGRAM(s) Oral two times a day  thiamine 100 milliGRAM(s) Oral daily    MEDICATIONS  (PRN):  LORazepam   Injectable 1 milliGRAM(s) IV Push every 2 hours PRN Symptom-triggered: 2 point increase in CIWA -Ar score and a total score of 7 or LESS  ondansetron Injectable 4 milliGRAM(s) IV Push every 6 hours PRN Nausea      Allergies    penicillin (Unknown)    Intolerances        Review of Systems:    see above unable to obtain in entirety     Vital Signs Last 24 Hrs  T(C): 36.8 (17 Jul 2020 07:32), Max: 36.8 (16 Jul 2020 12:31)  T(F): 98.3 (17 Jul 2020 07:32), Max: 98.3 (17 Jul 2020 07:32)  HR: 68 (17 Jul 2020 07:32) (56 - 68)  BP: 114/74 (17 Jul 2020 07:32) (103/61 - 114/74)  BP(mean): --  RR: 17 (17 Jul 2020 07:32) (16 - 19)  SpO2: 99% (17 Jul 2020 07:32) (92% - 99%)    PHYSICAL EXAM:      Constitutional: lying in bed  HEENT: ncat  Gastrointestinal: soft nt +dt  Extremities: mild edema  Vascular: + peripheral pulses  Neurological: lethargic but arousable, appropriate, periods of confusion  Skin: jaundiced, scattered ecchymosis    LABS:                        7.9    9.62  )-----------( 82       ( 16 Jul 2020 07:01 )             24.8     07-16    140  |  104  |  27<H>  ----------------------------<  82  4.0   |  32<H>  |  1.10    Ca    8.3<L>      16 Jul 2020 07:01  Phos  3.7     07-16  Mg     2.3     07-16    TPro  5.9<L>  /  Alb  1.6<L>  /  TBili  21.4<HH>  /  DBili  x   /  AST  85<H>  /  ALT  42  /  AlkPhos  103  07-16    PT/INR - ( 16 Jul 2020 07:01 )   PT: 36.3 sec;   INR: 3.28 ratio               RADIOLOGY & ADDITIONAL TESTS:

## 2020-07-18 NOTE — PROGRESS NOTE ADULT - ASSESSMENT
49 y/o M with a PMHx of alcohol dependence, liver cirrhosis, esophageal varices s/p banding in 2017, ascites s/p paracentesis 1 year ago, IBS, transferred to [facility] for acute on chronic liver failure, and liver transplant evaluation.

## 2020-07-18 NOTE — PROGRESS NOTE ADULT - PROBLEM SELECTOR PLAN 1
Decompensated hepatic cirrhosis w/liver failure. MELD score today is 32.   -cw rifaximin and lactulose for hepatic encephalopathy   -trend INR, c/w vitamin k  -with ascites. s/p paracentesis at [place]. fluid studies not suggestive of SBP. BCx and UCx 7/12 NGTD. s/p aztreonam from 7/13-7/14.   -cw cipro for sbp ppx  -cw lasix and spironolactone daily  - consult placed to hepatology and transplant team

## 2020-07-18 NOTE — PROGRESS NOTE ADULT - ASSESSMENT
Assessment and Recommendation:    Problem/Recommendation - 1:  Problem: Alcoholic hepatitis with ascites  sp dx tap; saag cw phtn, cx ngtd and cyto neg  cont prednisolone for high df total 28 day course  cont rifaximin and lactulose- titrate for 3 soft bms/day  cont pepcid, propanolol w parameters, diuretics per primary  avoid hepatotoxins; monitor renal fxn; daily meld labs   low na/pro diet as tolerated  monitor gi function  to follow  transplant hepatology eval pending       Problem/Recommendation - 2:  ·  Problem: Liver failure, acute.  Recommendation: as above  decompensated cirrhosis       Problem/Recommendation - 3:  ·  Problem: Esophageal varices with bleeding.  Recommendation:   no s/s active gib  continue BB     Problem/Recommendation - 4:  ·  Problem: ETOH abuse.  Recommendation: GREGORIAWA protocol

## 2020-07-18 NOTE — CONSULT NOTE ADULT - SUBJECTIVE AND OBJECTIVE BOX
Chief Complaint:  Patient is a 50y old  Male who presents with a chief complaint of liver transplant evaluation (2020 13:06)      HPI:    50 year old man with alcohol use disorder c/b relapse, history of alcohol hepatitis (s/p prednisone course), alcoholic cirrhosis decompensated by esophageal varices s/p EVL (2017), portal hypertensive gastropathy and large volume ascites transferred from [facility] for liver transplantation evaluation. Patient was admitted to OSH for encephalopathy and jaundice, subsequently transferred to ICU for acute alcohol intoxication with withdrawal. Hospital course complicated by acute alcoholic hepatitis and elevated Maddrey's DF for which he was started on prednisolone. Lactulose and rifaximin were initiated for hepatic encephalopathy with hyperammonemia with subsequently improvement in mental status. Diagnostic paracentesis was performed to rule out SBP although patient received IV antibiotics empirically. Patient well known to [facility] Hepatologist Dr. Edy Drew who advised transfer from OSH.     Patient reports resuming alcohol use one month ago - approximately 1 quart of vodka daily. His last alcohol use was day of admission to OSH (5 days ago). His longest period of sobriety was for 6 months previously.      Allergies:  penicillin (Unknown)      Home Medications:    · 	thiamine 100 mg oral tablet: Last Dose Taken:  , 1 tab(s) orally once a day  · 	folic acid 1 mg oral tablet: Last Dose Taken:  , 1 tab(s) orally once a day  · 	Multiple Vitamins oral tablet: Last Dose Taken:  , 1 tab(s) orally once a day  · 	rifAXIMin 550 mg oral tablet: Last Dose Taken:  , 1 tab(s) orally 2 times a day  · 	magnesium oxide 400 mg (241.3 mg elemental magnesium) oral tablet: Last Dose Taken:  , 1 tab(s) orally once a day  · 	lactulose 10 g/15 mL oral syrup: Last Dose Taken:  , 30 milliliter(s) orally 4 times a day  · 	famotidine 20 mg oral tablet: Last Dose Taken:  , 1 tab(s) orally 2 times a day  · 	furosemide 20 mg oral tablet: Last Dose Taken:  , 2 tab(s) orally once a day  · 	spironolactone 100 mg oral tablet: 1.5 tab(s) orally once a day  · 	nadolol 20 mg oral tablet: 1 tab(s) orally once a day  · 	baclofen 10 mg oral tablet: Last Dose Taken:  , 1 tab(s) orally 3 times a day  · 	Ventolin HFA 90 mcg/inh inhalation aerosol: 2 puff(s) inhaled every 6 hours, As Needed  · 	ciprofloxacin 500 mg oral tablet: Last Dose Taken:  , 1 tab(s) orally once a day    Hospital Medications:  ciprofloxacin     Tablet 500 milliGRAM(s) Oral daily  famotidine    Tablet 20 milliGRAM(s) Oral daily  folic acid 1 milliGRAM(s) Oral daily  furosemide    Tablet 20 milliGRAM(s) Oral daily  heparin   Injectable 5000 Unit(s) SubCutaneous every 12 hours  lactulose Syrup 20 Gram(s) Oral four times a day  LORazepam   Injectable 1 milliGRAM(s) IV Push every 2 hours PRN  LORazepam   Injectable 1 milliGRAM(s) IV Push every 1 hour PRN  magnesium oxide 400 milliGRAM(s) Oral daily  multivitamin 1 Tablet(s) Oral daily  ondansetron Injectable 8 milliGRAM(s) IV Push two times a day PRN  phytonadione   Solution 5 milliGRAM(s) Oral daily  prednisoLONE    3 mG/mL Solution (ORAPRED) 40 milliGRAM(s) Oral daily  propranolol 10 milliGRAM(s) Oral every 12 hours  rifAXIMin 550 milliGRAM(s) Oral two times a day  spironolactone 50 milliGRAM(s) Oral daily  thiamine 100 milliGRAM(s) Oral daily      PMHX/PSHX:  IBS (irritable bowel syndrome)  HTN (hypertension)  Ascites due to alcoholic cirrhosis  Cirrhosis of liver  ETOH abuse  Esophageal varices with bleeding  History of ankle surgery      Family history:  FHx: COPD (chronic obstructive pulmonary disease)  FH: lung cancer      Social History: Resumed alcohol use daily for the past month - previously sober for 6 months. No tobacco use.     ROS:     General:  No weight loss, fevers, chills, night sweats, fatigue  Eyes:  No vision changes +yellowing of eyes x1 month ago  ENT:  No throat pain, runny nose  CV:  No chest pain, palpitations  Resp:  No SOB, cough, wheezing  GI:  See HPI  :  No burning with urination, no hematuria   Muscle:  No muscle pain, weakness  Neuro:  No numbness/tingling, memory problems  Psych:  No fatigue, insomnia, mood problems  Heme:  No easy bruisability  Skin:  No rash, itching       PHYSICAL EXAM:     GENERAL:  Appears acutely ill, lethargic, responds to questions, sitting upright in bed eating breakfast  HEENT:  NC/AT,  conjunctivae clear and pink +scleral icterus   CHEST:  Full & symmetric excursion, no increased effort, breath sounds clear  HEART:  Regular rhythm & rate,  ABDOMEN: Non-tender, mildly obese abdomen, no palpable fluid wave +minimally distended  EXTREMITIES:  2+ pitting edema in B/L LE up to knees   SKIN:  No rash/erythema/ecchymoses +spider angiomata +jaundice   NEURO:  Alert, orientedx3      Vital Signs:  Vital Signs Last 24 Hrs  T(C): 36.7 (2020 06:19), Max: 37.1 (2020 05:00)  T(F): 98 (2020 06:19), Max: 98.7 (2020 05:00)  HR: 56 (2020 06:19) (56 - 66)  BP: 125/69 (2020 06:19) (111/72 - 125/69)  BP(mean): --  RR: 18 (2020 06:19) (17 - 18)  SpO2: 98% (2020 06:19) (94% - 98%)  Daily     Daily Weight in k.9 (2020 22:08)    LABS:                        8.5    9.71  )-----------( 70       ( 2020 07:01 )             27.8     07-18    138  |  103  |  26<H>  ----------------------------<  102<H>  4.0   |  25  |  0.67    Ca    8.9      2020 07:01  Phos  2.9     07-18  Mg     2.3     07-18    TPro  6.5  /  Alb  2.5<L>  /  TBili  25.6<H>  /  DBili  x   /  AST  96<H>  /  ALT  48<H>  /  AlkPhos  89  07-18    LIVER FUNCTIONS - ( 2020 07:01 )  Alb: 2.5 g/dL / Pro: 6.5 g/dL / ALK PHOS: 89 U/L / ALT: 48 U/L / AST: 96 U/L / GGT: x           PT/INR - ( 2020 01:22 )   PT: 32.3 sec;   INR: 2.85 ratio    PTT - ( 2020 01:22 )  PTT:50.1 sec      Imaging: Chief Complaint:  Patient is a 50y old  Male who presents with a chief complaint of liver transplant evaluation (2020 13:06)      HPI:    50 year old man with alcohol use disorder c/b relapse, history of alcohol hepatitis (s/p prednisone course), alcoholic cirrhosis decompensated by esophageal varices s/p EVL (2017), portal hypertensive gastropathy and large volume ascites transferred from [facility] for liver transplantation evaluation. Patient was admitted to OSH for encephalopathy and jaundice, subsequently transferred to ICU for acute alcohol intoxication with withdrawal. Hospital course complicated by acute alcoholic hepatitis and elevated Maddrey's DF for which he was started on prednisolone. Lactulose and rifaximin were initiated for hepatic encephalopathy with hyperammonemia with subsequently improvement in mental status. Diagnostic paracentesis was performed to rule out SBP although patient received IV antibiotics (aztreonam -) empirically. Patient well known to [facility] Hepatologist Dr. Edy Drew who advised transfer from OSH.     Patient reports resuming alcohol use one month ago - approximately 1 quart of vodka daily. His last alcohol use was day of admission to OSH (5 days ago). His longest period of sobriety was for 6 months previously.      Allergies:  penicillin (Unknown)      Home Medications:    · 	thiamine 100 mg oral tablet: Last Dose Taken:  , 1 tab(s) orally once a day  · 	folic acid 1 mg oral tablet: Last Dose Taken:  , 1 tab(s) orally once a day  · 	Multiple Vitamins oral tablet: Last Dose Taken:  , 1 tab(s) orally once a day  · 	rifAXIMin 550 mg oral tablet: Last Dose Taken:  , 1 tab(s) orally 2 times a day  · 	magnesium oxide 400 mg (241.3 mg elemental magnesium) oral tablet: Last Dose Taken:  , 1 tab(s) orally once a day  · 	lactulose 10 g/15 mL oral syrup: Last Dose Taken:  , 30 milliliter(s) orally 4 times a day  · 	famotidine 20 mg oral tablet: Last Dose Taken:  , 1 tab(s) orally 2 times a day  · 	furosemide 20 mg oral tablet: Last Dose Taken:  , 2 tab(s) orally once a day  · 	spironolactone 100 mg oral tablet: 1.5 tab(s) orally once a day  · 	nadolol 20 mg oral tablet: 1 tab(s) orally once a day  · 	baclofen 10 mg oral tablet: Last Dose Taken:  , 1 tab(s) orally 3 times a day  · 	Ventolin HFA 90 mcg/inh inhalation aerosol: 2 puff(s) inhaled every 6 hours, As Needed  · 	ciprofloxacin 500 mg oral tablet: Last Dose Taken:  , 1 tab(s) orally once a day    Hospital Medications:  ciprofloxacin     Tablet 500 milliGRAM(s) Oral daily  famotidine    Tablet 20 milliGRAM(s) Oral daily  folic acid 1 milliGRAM(s) Oral daily  furosemide    Tablet 20 milliGRAM(s) Oral daily  heparin   Injectable 5000 Unit(s) SubCutaneous every 12 hours  lactulose Syrup 20 Gram(s) Oral four times a day  LORazepam   Injectable 1 milliGRAM(s) IV Push every 2 hours PRN  LORazepam   Injectable 1 milliGRAM(s) IV Push every 1 hour PRN  magnesium oxide 400 milliGRAM(s) Oral daily  multivitamin 1 Tablet(s) Oral daily  ondansetron Injectable 8 milliGRAM(s) IV Push two times a day PRN  phytonadione   Solution 5 milliGRAM(s) Oral daily  prednisoLONE    3 mG/mL Solution (ORAPRED) 40 milliGRAM(s) Oral daily  propranolol 10 milliGRAM(s) Oral every 12 hours  rifAXIMin 550 milliGRAM(s) Oral two times a day  spironolactone 50 milliGRAM(s) Oral daily  thiamine 100 milliGRAM(s) Oral daily      PMHX/PSHX:  IBS (irritable bowel syndrome)  HTN (hypertension)  Ascites due to alcoholic cirrhosis  Cirrhosis of liver  ETOH abuse  Esophageal varices with bleeding  History of ankle surgery      Family history:  FHx: COPD (chronic obstructive pulmonary disease)  FH: lung cancer      Social History: Resumed alcohol use daily for the past month - previously sober for 6 months. No tobacco use.     ROS:     General:  No weight loss, fevers, chills, night sweats, fatigue  Eyes:  No vision changes +yellowing of eyes x1 month ago  ENT:  No throat pain, runny nose  CV:  No chest pain, palpitations  Resp:  No SOB, cough, wheezing  GI:  See HPI  :  No burning with urination, no hematuria   Muscle:  No muscle pain, weakness  Neuro:  No numbness/tingling, memory problems  Psych:  No fatigue, insomnia, mood problems  Heme:  No easy bruisability  Skin:  No rash, itching       PHYSICAL EXAM:     GENERAL:  Appears acutely ill, lethargic, responds to questions, sitting upright in bed eating breakfast  HEENT:  NC/AT,  conjunctivae clear and pink +scleral icterus   CHEST:  Full & symmetric excursion, no increased effort, breath sounds clear  HEART:  Regular rhythm & rate,  ABDOMEN: Non-tender, mildly obese abdomen, no palpable fluid wave +minimally distended  EXTREMITIES:  2+ pitting edema in B/L LE up to knees   SKIN:  No rash/erythema/ecchymoses +spider angiomata +jaundice   NEURO:  Alert, orientedx3      Vital Signs:  Vital Signs Last 24 Hrs  T(C): 36.7 (2020 06:19), Max: 37.1 (2020 05:00)  T(F): 98 (2020 06:19), Max: 98.7 (2020 05:00)  HR: 56 (2020 06:19) (56 - 66)  BP: 125/69 (2020 06:19) (111/72 - 125/69)  BP(mean): --  RR: 18 (2020 06:19) (17 - 18)  SpO2: 98% (2020 06:19) (94% - 98%)  Daily     Daily Weight in k.9 (2020 22:08)    LABS:                        8.5    9.71  )-----------( 70       ( 2020 07:01 )             27.8     07-18    138  |  103  |  26<H>  ----------------------------<  102<H>  4.0   |  25  |  0.67    Ca    8.9      2020 07:01  Phos  2.9     07-18  Mg     2.3     07-18    TPro  6.5  /  Alb  2.5<L>  /  TBili  25.6<H>  /  DBili  x   /  AST  96<H>  /  ALT  48<H>  /  AlkPhos  89  07-18    LIVER FUNCTIONS - ( 2020 07:01 )  Alb: 2.5 g/dL / Pro: 6.5 g/dL / ALK PHOS: 89 U/L / ALT: 48 U/L / AST: 96 U/L / GGT: x           PT/INR - ( 2020 01:22 )   PT: 32.3 sec;   INR: 2.85 ratio    PTT - ( 2020 01:22 )  PTT:50.1 sec      Imaging:

## 2020-07-18 NOTE — PROGRESS NOTE ADULT - SUBJECTIVE AND OBJECTIVE BOX
Chief Complaint:  Patient is a 50y old  Male who presents with a chief complaint of liver transplant evaluation (2020 10:52)      HPI:    50 year old man with alcohol use disorder, EtOH cirrhosis c/b esophageal varices s/p EVL (2017), ascites and IBS transferred from [facility]  for liver transplantation evaluation. Patient was admitted to OSH for encephalopathy and jaundice, subsequently transferred to ICU for acute alcohol intoxication vs. hepatic encephalopathy. He was managed with prednisolone for acute alcoholic hepatitis and elevated Maddrey's DF. Lactulose and rifaximin were initiated for hepatic encephalopathy and hyperammonemia with improvement in mental status. Diagnostic paracentesis ruled out SBP although patient received IV antibiotics empirically. Patient well known to [facility] Hepatologist Dr. Edy Drew.    He states he relapsed in April after successfully quitting for 6 months previously. He has been admitted for detox multiple times in the past, in  he was admitted at [facility] and 2017 at [facility]. He denies seizures in the past from withdrawal however he does endorse shaking, N, V when he doesnt drinks for 2-3 days. Pt states he drinks 1 quart of vodka daily and his last drink was yesterday around 3 PM. He follows with GI Dr. Drew at [facility] regularly for his cirrhosis. He noticed he was getting jaundiced about 1 month ago. He states the last time this happened his T bili was also elevated just like today. He also endorses falling 1 week ago onto his right hip. He has been able to walk on it but has pain when standing for a long time. Denies shooting pain into his leg, numbness tingling. He endorses chills, difficulty with balance, N, V of clear liquid when he doesn't drink for 2-3 days. He denies current fevers, chills, neck pain, visual changes, CP, SOB, cough, palpitations, abdominal pain, diarrhea, dysuria. He endorses chronic balance difficulties, abdominal distention without pain, yellowing of his skin and eyes,   darkening of his urine, right hip pain.      Allergies:  penicillin (Unknown)      Home Medications:    · 	thiamine 100 mg oral tablet: Last Dose Taken:  , 1 tab(s) orally once a day  · 	folic acid 1 mg oral tablet: Last Dose Taken:  , 1 tab(s) orally once a day  · 	Multiple Vitamins oral tablet: Last Dose Taken:  , 1 tab(s) orally once a day  · 	rifAXIMin 550 mg oral tablet: Last Dose Taken:  , 1 tab(s) orally 2 times a day  · 	magnesium oxide 400 mg (241.3 mg elemental magnesium) oral tablet: Last Dose Taken:  , 1 tab(s) orally once a day  · 	lactulose 10 g/15 mL oral syrup: Last Dose Taken:  , 30 milliliter(s) orally 4 times a day  · 	famotidine 20 mg oral tablet: Last Dose Taken:  , 1 tab(s) orally 2 times a day  · 	furosemide 20 mg oral tablet: Last Dose Taken:  , 2 tab(s) orally once a day  · 	spironolactone 100 mg oral tablet: 1.5 tab(s) orally once a day  · 	nadolol 20 mg oral tablet: 1 tab(s) orally once a day  · 	baclofen 10 mg oral tablet: Last Dose Taken:  , 1 tab(s) orally 3 times a day  · 	Ventolin HFA 90 mcg/inh inhalation aerosol: 2 puff(s) inhaled every 6 hours, As Needed  · 	ciprofloxacin 500 mg oral tablet: Last Dose Taken:  , 1 tab(s) orally once a day    Hospital Medications:  ciprofloxacin     Tablet 500 milliGRAM(s) Oral daily  famotidine    Tablet 20 milliGRAM(s) Oral daily  folic acid 1 milliGRAM(s) Oral daily  furosemide    Tablet 20 milliGRAM(s) Oral daily  heparin   Injectable 5000 Unit(s) SubCutaneous every 12 hours  lactulose Syrup 20 Gram(s) Oral four times a day  LORazepam   Injectable 1 milliGRAM(s) IV Push every 2 hours PRN  LORazepam   Injectable 1 milliGRAM(s) IV Push every 1 hour PRN  magnesium oxide 400 milliGRAM(s) Oral daily  multivitamin 1 Tablet(s) Oral daily  ondansetron Injectable 8 milliGRAM(s) IV Push two times a day PRN  phytonadione   Solution 5 milliGRAM(s) Oral daily  prednisoLONE    3 mG/mL Solution (ORAPRED) 40 milliGRAM(s) Oral daily  propranolol 10 milliGRAM(s) Oral every 12 hours  rifAXIMin 550 milliGRAM(s) Oral two times a day  spironolactone 50 milliGRAM(s) Oral daily  thiamine 100 milliGRAM(s) Oral daily      PMHX/PSHX:  IBS (irritable bowel syndrome)  HTN (hypertension)  Ascites due to alcoholic cirrhosis  Cirrhosis of liver  ETOH abuse  Esophageal varices with bleeding  History of ankle surgery      Family history:  FHx: COPD (chronic obstructive pulmonary disease)  FH: lung cancer      Social History:     ROS:     General:  No weight loss, fevers, chills, night sweats, fatigue  Eyes:  No vision changes, no yellowing of eyes   ENT:  No throat pain, runny nose  CV:  No chest pain, palpitations  Resp:  No SOB, cough, wheezing  GI:  See HPI  :  No burning with urination, no hematuria   Muscle:  No muscle pain, weakness  Neuro:  No numbness/tingling, memory problems  Psych:  No fatigue, insomnia, mood problems  Heme:  No easy bruisability  Skin:  No rash, itching       PHYSICAL EXAM:     GENERAL:  Appears stated age, well-groomed, well-nourished, no distress  HEENT:  NC/AT,  conjunctivae clear and pink,  no JVD  CHEST:  Full & symmetric excursion, no increased effort, breath sounds clear  HEART:  Regular rhythm, S1, S2, no murmur/rub/S3/S4, no abdominal bruit, no edema  ABDOMEN:  Soft, non-tender, non-distended, normoactive bowel sounds,  no masses ,  EXTREMITIES:  no cyanosis,clubbing or edema  SKIN:  No rash/erythema/ecchymoses/petechiae/wounds/abscess/warm/dry  NEURO:  Alert, oriented    Vital Signs:  Vital Signs Last 24 Hrs  T(C): 36.7 (2020 06:19), Max: 37.1 (2020 05:00)  T(F): 98 (2020 06:19), Max: 98.7 (2020 05:00)  HR: 56 (2020 06:19) (56 - 66)  BP: 125/69 (2020 06:19) (111/72 - 125/69)  BP(mean): --  RR: 18 (2020 06:19) (17 - 18)  SpO2: 98% (2020 06:19) (94% - 98%)  Daily     Daily Weight in k.9 (2020 22:08)    LABS:                        8.5    9.71  )-----------( 70       ( 2020 07:01 )             27.8     07-18    138  |  103  |  26<H>  ----------------------------<  102<H>  4.0   |  25  |  0.67    Ca    8.9      2020 07:01  Phos  2.9       Mg     2.3         TPro  6.5  /  Alb  2.5<L>  /  TBili  25.6<H>  /  DBili  x   /  AST  96<H>  /  ALT  48<H>  /  AlkPhos  89  18    LIVER FUNCTIONS - ( 2020 07:01 )  Alb: 2.5 g/dL / Pro: 6.5 g/dL / ALK PHOS: 89 U/L / ALT: 48 U/L / AST: 96 U/L / GGT: x           PT/INR - ( 2020 01:22 )   PT: 32.3 sec;   INR: 2.85 ratio         PTT - ( 2020 01:22 )  PTT:50.1 sec        Imaging:

## 2020-07-18 NOTE — PROGRESS NOTE ADULT - PROBLEM SELECTOR PLAN 3
Chronic ETOH abuse since for >20 years with recent admission to [place] ICU for withdrawal. Drinks 1 quart vodka daily, last drink was 7/9.   -symptom triggered CIWA with Ativan

## 2020-07-19 LAB
ALBUMIN SERPL ELPH-MCNC: 2.5 G/DL — LOW (ref 3.3–5)
ALP SERPL-CCNC: 106 U/L — SIGNIFICANT CHANGE UP (ref 40–120)
ALT FLD-CCNC: 51 U/L — HIGH (ref 10–45)
ANION GAP SERPL CALC-SCNC: 10 MMOL/L — SIGNIFICANT CHANGE UP (ref 5–17)
AST SERPL-CCNC: 99 U/L — HIGH (ref 10–40)
BILIRUB SERPL-MCNC: 23.4 MG/DL — HIGH (ref 0.2–1.2)
BUN SERPL-MCNC: 27 MG/DL — HIGH (ref 7–23)
CALCIUM SERPL-MCNC: 8.7 MG/DL — SIGNIFICANT CHANGE UP (ref 8.4–10.5)
CHLORIDE SERPL-SCNC: 101 MMOL/L — SIGNIFICANT CHANGE UP (ref 96–108)
CO2 SERPL-SCNC: 25 MMOL/L — SIGNIFICANT CHANGE UP (ref 22–31)
CREAT SERPL-MCNC: 0.77 MG/DL — SIGNIFICANT CHANGE UP (ref 0.5–1.3)
GLUCOSE SERPL-MCNC: 125 MG/DL — HIGH (ref 70–99)
HCT VFR BLD CALC: 28.4 % — LOW (ref 39–50)
HGB BLD-MCNC: 8.8 G/DL — LOW (ref 13–17)
INR BLD: 2.58 RATIO — HIGH (ref 0.88–1.16)
MCHC RBC-ENTMCNC: 31 GM/DL — LOW (ref 32–36)
MCHC RBC-ENTMCNC: 39.6 PG — HIGH (ref 27–34)
MCV RBC AUTO: 127.9 FL — HIGH (ref 80–100)
NRBC # BLD: 0 /100 WBCS — SIGNIFICANT CHANGE UP (ref 0–0)
PLATELET # BLD AUTO: 80 K/UL — LOW (ref 150–400)
POTASSIUM SERPL-MCNC: 4.3 MMOL/L — SIGNIFICANT CHANGE UP (ref 3.5–5.3)
POTASSIUM SERPL-SCNC: 4.3 MMOL/L — SIGNIFICANT CHANGE UP (ref 3.5–5.3)
PROT SERPL-MCNC: 6.4 G/DL — SIGNIFICANT CHANGE UP (ref 6–8.3)
PROTHROM AB SERPL-ACNC: 29.1 SEC — HIGH (ref 10.6–13.6)
RBC # BLD: 2.22 M/UL — LOW (ref 4.2–5.8)
RBC # FLD: 16.9 % — HIGH (ref 10.3–14.5)
SODIUM SERPL-SCNC: 136 MMOL/L — SIGNIFICANT CHANGE UP (ref 135–145)
WBC # BLD: 8.32 K/UL — SIGNIFICANT CHANGE UP (ref 3.8–10.5)
WBC # FLD AUTO: 8.32 K/UL — SIGNIFICANT CHANGE UP (ref 3.8–10.5)

## 2020-07-19 PROCEDURE — 99232 SBSQ HOSP IP/OBS MODERATE 35: CPT | Mod: GC

## 2020-07-19 PROCEDURE — 99233 SBSQ HOSP IP/OBS HIGH 50: CPT

## 2020-07-19 RX ADMIN — Medication 500 MILLIGRAM(S): at 11:24

## 2020-07-19 RX ADMIN — Medication 100 MILLIGRAM(S): at 11:24

## 2020-07-19 RX ADMIN — Medication 1 MILLIGRAM(S): at 11:24

## 2020-07-19 RX ADMIN — Medication 5 MILLIGRAM(S): at 11:24

## 2020-07-19 RX ADMIN — Medication 20 MILLIGRAM(S): at 05:27

## 2020-07-19 RX ADMIN — LACTULOSE 20 GRAM(S): 10 SOLUTION ORAL at 09:23

## 2020-07-19 RX ADMIN — SPIRONOLACTONE 50 MILLIGRAM(S): 25 TABLET, FILM COATED ORAL at 05:28

## 2020-07-19 RX ADMIN — HEPARIN SODIUM 5000 UNIT(S): 5000 INJECTION INTRAVENOUS; SUBCUTANEOUS at 05:27

## 2020-07-19 RX ADMIN — LACTULOSE 20 GRAM(S): 10 SOLUTION ORAL at 05:27

## 2020-07-19 RX ADMIN — Medication 1 TABLET(S): at 11:24

## 2020-07-19 RX ADMIN — MAGNESIUM OXIDE 400 MG ORAL TABLET 400 MILLIGRAM(S): 241.3 TABLET ORAL at 11:24

## 2020-07-19 RX ADMIN — LACTULOSE 20 GRAM(S): 10 SOLUTION ORAL at 21:07

## 2020-07-19 RX ADMIN — Medication 40 MILLIGRAM(S): at 05:28

## 2020-07-19 RX ADMIN — FAMOTIDINE 20 MILLIGRAM(S): 10 INJECTION INTRAVENOUS at 11:24

## 2020-07-19 RX ADMIN — LACTULOSE 20 GRAM(S): 10 SOLUTION ORAL at 13:02

## 2020-07-19 RX ADMIN — LACTULOSE 20 GRAM(S): 10 SOLUTION ORAL at 17:24

## 2020-07-19 NOTE — PROGRESS NOTE ADULT - ASSESSMENT
Impression: 49 year old man with alcohol abuse/dependence with multiple relapse, history of alcoholic hepatitis, decompensated alcoholic cirrhosis c/b variceal bleed, ascites, peripheral edema who presents from OSH with acute alcoholic hepatitis and decompensated cirrhosis in setting of active alcohol use for transplant evaluation.     #Alcoholic hepatitis with hyperbilirubinemia: Secondary to recent alcohol relapse - admits to drinking quart of vodka daily, last drink on 7/9/20.   Total bilirubin markedly elevated but stable since initial presentation. Maddrey's DF score 111.6 on 7/18/20 - started on prednisolone 40 mg daily (day 1).   #Alcohol-related cirrhosis: Decompensated by prior variceal bleed, ascites, peripheral edema and now, PSE. No prior history of SBP. MELD-Na 30 7/18/20  -Ascites:   -Varices: Most recent EGD 3/2019 with Grade II EV, portal hypertensive gastropathy, duodenopathy. On nadolol 40 mg at home.   -HCC: No evidence on US abdomen 10/2019 (> 6 months ago).   -PSE: Newly diagnosed PSE at OSH - encephalopathy now improving on lactulose and rifaximin. On rifaximin alone outpatient.   #Anemia, macrocytic: Suspect multifactorial due to anemia of chronic disease vs. bone marrow suppression from alcohol abuse vs. chronic GI blood loss in setting of known EV; macrocytosis likely secondary to alcohol use. Hb stable since admission to OSH without overt GI bleeding.   #Transplant evaluation: Not a candidate at this point in time due to recent alcohol relapse/active drinking despite awareness of alcoholic related liver disease. He may be re-considered for transplant pending completion of alcohol rehab with alcohol use cessation.       Recommendations:  - continue prednisolone 40 mg daily for acute alcoholic hepatitis  - US abdomen with Doppler for HCC/PVT surveillance - ordered  - propanolol 10 BID as secondary ppx for variceal re-bleeding  - can continue low dose diuretics - will uptitrate as needed   - agree with oral vitamin K 5 g daily x 3 days for colagulopathy (day 2/3)  - lactulose 20 g every 4 hours titrated to improvement in mental status/3-4 BMs per day   - CIWA monitoring per primary team however benzodiazepine use can potentially worsen encephalopathy  - continue ciprofloxacin 500 mg daily SBP ppx  - daily INR and CMP for MELD-Na score  - daily MVI, folate, thiamine daily  - Social Work consult tomorrow for alcohol rehab resources/cessation plan  - Transplant Hepatology to follow      Cami Forman PGY-5  Gastroenterology Fellow  Pager #[phone]  E-mail [email or web address]  Call [phone] to speak to answering service for on-call GI fellow 5pm-7am and weekends.

## 2020-07-19 NOTE — PROGRESS NOTE ADULT - PROBLEM SELECTOR PLAN 3
Chronic ETOH abuse since for >20 years with recent admission to [place] ICU for withdrawal. Drinks 1 quart vodka daily, last drink was 7/9.   -symptom triggered CIWA with Ativan

## 2020-07-19 NOTE — PROGRESS NOTE ADULT - SUBJECTIVE AND OBJECTIVE BOX
INTERVAL HPI/OVERNIGHT EVENTS:  pt seen and examined  resting in bed,         MEDICATIONS  (STANDING):  enoxaparin Injectable 40 milliGRAM(s) SubCutaneous daily  ergocalciferol 07071 Unit(s) Oral every week  famotidine    Tablet 20 milliGRAM(s) Oral two times a day  folic acid 1 milliGRAM(s) Oral daily  furosemide    Tablet 20 milliGRAM(s) Oral daily  lactulose Syrup 20 Gram(s) Oral four times a day  magnesium oxide 400 milliGRAM(s) Oral daily  multivitamin 1 Tablet(s) Oral daily  prednisoLONE    3 mG/mL Solution (ORAPRED) 40 milliGRAM(s) Oral daily  propranolol 10 milliGRAM(s) Oral every 12 hours  rifAXIMin 550 milliGRAM(s) Oral two times a day  thiamine 100 milliGRAM(s) Oral daily    MEDICATIONS  (PRN):  LORazepam   Injectable 1 milliGRAM(s) IV Push every 2 hours PRN Symptom-triggered: 2 point increase in CIWA -Ar score and a total score of 7 or LESS  ondansetron Injectable 4 milliGRAM(s) IV Push every 6 hours PRN Nausea      Allergies    penicillin (Unknown)    Intolerances        Review of Systems:    see above unable to obtain in entirety     Vital Signs Last 24 Hrs  T(C): 36.8 (17 Jul 2020 07:32), Max: 36.8 (16 Jul 2020 12:31)  T(F): 98.3 (17 Jul 2020 07:32), Max: 98.3 (17 Jul 2020 07:32)  HR: 68 (17 Jul 2020 07:32) (56 - 68)  BP: 114/74 (17 Jul 2020 07:32) (103/61 - 114/74)  BP(mean): --  RR: 17 (17 Jul 2020 07:32) (16 - 19)  SpO2: 99% (17 Jul 2020 07:32) (92% - 99%)    PHYSICAL EXAM:      Constitutional: lying in bed  HEENT: ncat  Gastrointestinal: soft nt +dt  Extremities: mild edema  Vascular: + peripheral pulses  Neurological: lethargic but arousable, appropriate, periods of confusion  Skin: jaundiced, scattered ecchymosis    LABS:                        7.9    9.62  )-----------( 82       ( 16 Jul 2020 07:01 )             24.8     07-16    140  |  104  |  27<H>  ----------------------------<  82  4.0   |  32<H>  |  1.10    Ca    8.3<L>      16 Jul 2020 07:01  Phos  3.7     07-16  Mg     2.3     07-16    TPro  5.9<L>  /  Alb  1.6<L>  /  TBili  21.4<HH>  /  DBili  x   /  AST  85<H>  /  ALT  42  /  AlkPhos  103  07-16    PT/INR - ( 16 Jul 2020 07:01 )   PT: 36.3 sec;   INR: 3.28 ratio               RADIOLOGY & ADDITIONAL TESTS:

## 2020-07-19 NOTE — PROGRESS NOTE ADULT - PROBLEM SELECTOR PLAN 1
Decompensated hepatic cirrhosis w/liver failure. MELD score today is 32.   - c/w prednisone 40 mg po daily  -cw rifaximin and lactulose for hepatic encephalopathy   -trend INR and CMP daily  - c/w vitamin k x 3 days  -with ascites. s/p paracentesis at [place]. fluid studies not suggestive of SBP. BCx and UCx 7/12 NGTD. s/p aztreonam from 7/13-7/14.   -c/w cipro for sbp ppx  -cw lasix and spironolactone daily  -c/w propanolol as secondary ppx for variceal re-bleeding  -seen by transplant hepatology-- abdominal US with dopplers ordered  -currently not a transplant candidate (active etoh use)

## 2020-07-19 NOTE — PROGRESS NOTE ADULT - ASSESSMENT
49 y/o M with a PMHx of alcohol dependence, liver cirrhosis, esophageal varices s/p banding in 2017, ascites s/p paracentesis 1 year ago, IBS, transferred to [facility] for acute on chronic liver failure, and liver transplant evaluation.

## 2020-07-19 NOTE — PROGRESS NOTE ADULT - SUBJECTIVE AND OBJECTIVE BOX
Chief Complaint:  Patient is a 50y old  Male who presents with a chief complaint of liver transplant evaluation (2020 12:49)      Interval Events:     Patient reports feeling better today. He is eating well. No complaints. Three bowel movements charted in past 24 hours.     Allergies:  penicillin (Unknown)      Hospital Medications:  ciprofloxacin     Tablet 500 milliGRAM(s) Oral daily  famotidine    Tablet 20 milliGRAM(s) Oral daily  folic acid 1 milliGRAM(s) Oral daily  furosemide    Tablet 20 milliGRAM(s) Oral daily  lactulose Syrup 20 Gram(s) Oral every 4 hours  LORazepam   Injectable 1 milliGRAM(s) IV Push every 2 hours PRN  LORazepam   Injectable 1 milliGRAM(s) IV Push every 1 hour PRN  magnesium oxide 400 milliGRAM(s) Oral daily  multivitamin 1 Tablet(s) Oral daily  ondansetron Injectable 8 milliGRAM(s) IV Push two times a day PRN  phytonadione   Solution 5 milliGRAM(s) Oral daily  prednisoLONE    3 mG/mL Solution (ORAPRED) 40 milliGRAM(s) Oral daily  propranolol 10 milliGRAM(s) Oral every 12 hours  rifAXIMin 550 milliGRAM(s) Oral two times a day  spironolactone 50 milliGRAM(s) Oral daily  thiamine 100 milliGRAM(s) Oral daily      PMHX/PSHX:  IBS (irritable bowel syndrome)  HTN (hypertension)  Ascites due to alcoholic cirrhosis  Cirrhosis of liver  ETOH abuse  Esophageal varices with bleeding  History of ankle surgery      Family history:  FHx: COPD (chronic obstructive pulmonary disease)  FH: lung cancer      ROS:     General:  No weight loss, fevers, chills, night sweats, fatigue   Eyes:  No vision changes  ENT:  No sore throat, pain, runny nose  CV:  No chest pain, palpitations, dizziness   Resp:  No SOB, cough, wheezing  GI:  See HPI  :  No burning with urination, hematuria  Muscle:  No pain, weakness  Neuro:  No weakness/tingling, memory problems  Psych:  No fatigue, insomnia, mood problems, depression  Heme:  No easy bruisability  Skin:  No rash, edema      PHYSICAL EXAM:     GENERAL:  Less lethargic today, responds to questions, sitting upright in bed eating breakfast  HEENT:  NC/AT,  conjunctivae clear and pink +scleral icterus   CHEST:  Full & symmetric excursion, no increased effort, breath sounds clear  HEART:  Regular rhythm & rate,  ABDOMEN: Non-tender, mildly obese abdomen, no palpable fluid wave +minimally distended  EXTREMITIES:  2+ pitting edema in B/L LE up to knees   SKIN:  No rash/erythema/ecchymoses +spider angiomata +jaundice   NEURO:  Alert, orientedx3      Vital Signs:  Vital Signs Last 24 Hrs  T(C): 36.6 (2020 12:31), Max: 37 (2020 14:30)  T(F): 97.8 (2020 12:31), Max: 98.6 (2020 14:30)  HR: 83 (2020 12:31) (56 - 83)  BP: 109/74 (2020 12:31) (96/67 - 125/74)  BP(mean): --  RR: 18 (2020 12:31) (18 - 19)  SpO2: 98% (2020 12:31) (91% - 98%)  Daily     Daily Weight in k.1 (2020 09:53)    LABS:                        8.8    8.32  )-----------( 80       ( 2020 09:22 )             28.4     07-19    136  |  101  |  27<H>  ----------------------------<  125<H>  4.3   |  25  |  0.77    Ca    8.7      2020 09:22  Phos  2.9     07-18  Mg     2.3     07-18    TPro  6.4  /  Alb  2.5<L>  /  TBili  23.4<H>  /  DBili  x   /  AST  99<H>  /  ALT  51<H>  /  AlkPhos  106  07-19    LIVER FUNCTIONS - ( 2020 09:22 )  Alb: 2.5 g/dL / Pro: 6.4 g/dL / ALK PHOS: 106 U/L / ALT: 51 U/L / AST: 99 U/L / GGT: x           PT/INR - ( 2020 01:22 )   PT: 32.3 sec;   INR: 2.85 ratio    PTT - ( 2020 01:22 )  PTT:50.1 sec      Imaging:

## 2020-07-19 NOTE — PROGRESS NOTE ADULT - PROBLEM SELECTOR PROBLEM 4
67 yo M PMHx CAD s/p stent x 1 (2010), T2DM, HTN, Parkinson's presenting with c/o 10/10 chest pain described as burning radiating from abdomen to midsternum x 1 day. 67 yo M PMHx CAD s/p stent x 1 (2010), T2DM, HTN, Parkinson's presenting with c/o 10/10 chest pain described as burning radiating from abdomen to midsternum x 1 day.    Problem/Plan - 1:  ·  Problem: Chest pain.  Plan: telemonitor  cards fu   ischemia bailey as per cards     Problem/Plan - 2:  ·  Problem: CKD (chronic kidney disease).  Plan: Cr 2.28  Hx of Cr elevated in 2017--> 2.43   will hold losartan  trend bmp.   renal; following     Problem/Plan - 3:  ·  Problem: Hypertension.  Plan: cw current meds   DASH diet.   Problem/Plan - 4:  ·  Problem: Type 2 diabetes mellitus with chronic kidney disease, with long-term current use of insulin, unspecified CKD stage.  Plan: check hgb a1c  low carb diet  monitor FS  ISS     Problem/Plan - 5:  ·  Problem: Hypercholesterolemia.  Plan: check flp   continue statin. Problem/Plan - 1:  ·  Problem: Chest pain.  Plan: telemonitor  cards fu   ischemia bailey as per cards     Problem/Plan - 2:  ·  Problem: CKD (chronic kidney disease).  Plan: Cr 2.28  Hx of Cr elevated in 2017--> 2.43   will hold losartan  trend bmp.   renal; following     Problem/Plan - 3:  ·  Problem: Hypertension.  Plan: cw current meds   DASH diet.   Problem/Plan - 4:  ·  Problem: Type 2 diabetes mellitus with chronic kidney disease, with long-term current use of insulin, unspecified CKD stage.  Plan: check hgb a1c  low carb diet  monitor FS  ISS     Problem/Plan - 5:  ·  Problem: Hypercholesterolemia.  Plan: check flp   continue statin. Problem/Plan - 1:  ·  Problem: Chest pain.  Plan: telemonitor  cards fu   ischemia bailey as per cards     Problem/Plan - 2:  ·  Problem: CKD (chronic kidney disease).  Plan: Cr 2.28  Hx of Cr elevated in 2017--> 2.43   will hold losartan  trend bmp.   renal; following     Problem/Plan - 3:  ·  Problem: Hypertension.  Plan: cw current meds   DASH diet.   Problem/Plan - 4:  ·  Problem: Type 2 diabetes mellitus with chronic kidney disease, with long-term current use of insulin, unspecified CKD stage.  Plan: check hgb a1c  low carb diet  monitor FS  ISS     Problem/Plan - 5:  ·  Problem: Hypercholesterolemia.  Plan: check flp   continue statin. ASSESSMENT/PLAN:  67 yo M PMHx CAD s/p stent x 1 (2010), T2DM, HTN, Parkinson's and CKD stage 3b presents with chest pain.  He has an increase in troponin and will likely get cath this am.  Moderate risk for AUBRIE and the presence of proteinuria will increase that risk  Time of immobilization are common for him    1. Renal - No renal objection to cath today; ~3.5g/day proteinuria; Renal sono completed; NS@70ml/hr x 9 hours - started this AM at 9AM, monitor for AUBRIE    2. CVS - LHC this AM; 2.5mg po daily Norvasc, for tighter BP control, BP improving    3. Pulm - LE dopplers negative for DVT, VQ scan pending        Emili Holliday NP-C  [facility]  [phone] ASSESSMENT/PLAN:  69 yo M PMHx CAD s/p stent x 1 (2010), T2DM, HTN, Parkinson's and CKD stage 3b presents with chest pain.  He has an increase in troponin and will likely get cath this am.  Moderate risk for AUBRIE and the presence of proteinuria will increase that risk  Time of immobilization are common for him    1. Renal - ~3.5g/day proteinuria; Renal sono completed; No evidence of AUBRIE to date, continue to monitor    2. CVS - s/p cardiac cath 9/23 with triple vessel disease; Plan for transfer to [facility] for CABG vs. PCI    3. Pulm - LE dopplers negative for DVT, VQ scan pending        SILVIA QueenC  [facility]  [phone] ASSESSMENT/PLAN:  69 yo M PMHx CAD s/p stent x 1 (2010), T2DM, HTN, Parkinson's and CKD stage 3b presents with chest pain.  Moderate risk for AUBRIE and the presence of proteinuria will increase that risk  Time of immobilization are common for him    1. Renal - [id number], ~3.5g/day proteinuria; Renal sono completed; Renal function overall stable, trend BMP     2. CVS - s/p cardiac cath 9/23 with triple vessel disease; Plan for transfer to [facility] for CABG vs. PCI, await CTS decision    3. Pulm - LE dopplers negative for DVT, VQ scan pending        Emili Holliday NP-C  [facility]  [phone] Anemia 69 yo M PMHx CAD s/p stent x 1 (2010), T2DM, HTN, Parkinson's presenting with c/o 10/10 chest pain described as burning radiating from abdomen to midsternum x 1 day.

## 2020-07-19 NOTE — PROGRESS NOTE ADULT - SUBJECTIVE AND OBJECTIVE BOX
Patient has no complaints. Denies nausea, abdominal pain.     GENERAL: No fevers, no chills.  EYES: No blurry vision,  No photophobia  ENT: No sore throat.  No dysphagia  Cardiovascular: No chest pain, palpitations, orthopnea  Pulmonary: No cough, no wheezing. No shortness of breath  Gastrointestinal: No abdominal pain, no diarrhea, no constipation.   Musculoskeletal: No weakness.  No myalgias.  Dermatology:  No rashes.  Neuro: No Headache.  No vertigo.  No dizziness.  Psych: No anxiety, no depression.  Denies suicidal thoughts.    MEDICATIONS  (STANDING):  ciprofloxacin     Tablet 500 milliGRAM(s) Oral daily  famotidine    Tablet 20 milliGRAM(s) Oral daily  folic acid 1 milliGRAM(s) Oral daily  furosemide    Tablet 20 milliGRAM(s) Oral daily  lactulose Syrup 20 Gram(s) Oral every 4 hours  magnesium oxide 400 milliGRAM(s) Oral daily  multivitamin 1 Tablet(s) Oral daily  phytonadione   Solution 5 milliGRAM(s) Oral daily  prednisoLONE    3 mG/mL Solution (ORAPRED) 40 milliGRAM(s) Oral daily  propranolol 10 milliGRAM(s) Oral every 12 hours  rifAXIMin 550 milliGRAM(s) Oral two times a day  spironolactone 50 milliGRAM(s) Oral daily  thiamine 100 milliGRAM(s) Oral daily    MEDICATIONS  (PRN):  LORazepam   Injectable 1 milliGRAM(s) IV Push every 2 hours PRN CIWA-Ar score increase by 2 points and a total score of 7 or less  LORazepam   Injectable 1 milliGRAM(s) IV Push every 1 hour PRN CIWA-Ar score 8 or greater  ondansetron Injectable 8 milliGRAM(s) IV Push two times a day PRN Nausea and/or Vomiting    Vital Signs Last 24 Hrs  T(C): 36.6 (19 Jul 2020 12:31), Max: 37 (18 Jul 2020 14:30)  T(F): 97.8 (19 Jul 2020 12:31), Max: 98.6 (18 Jul 2020 14:30)  HR: 83 (19 Jul 2020 12:31) (56 - 83)  BP: 109/74 (19 Jul 2020 12:31) (96/67 - 125/74)  BP(mean): --  RR: 18 (19 Jul 2020 12:31) (18 - 19)  SpO2: 98% (19 Jul 2020 12:31) (91% - 98%)    GENERAL: NAD, obese  HEAD:  Atraumatic, Normocephalic  EYES: scleral icterus  ENT: Pharynx not erythematous  PULMONARY: Clear to auscultation bilaterally; No wheeze  CARDIOVASCULAR: Regular rate and rhythm; No murmurs, rubs, or gallops  ABDOMEN: Soft, Nontender, + distension; Bowel sounds present  EXTREMITIES:  2+ Peripheral Pulses, No clubbing, cyanosis, or edema  MUSCULOSKELETAL: No calf tenderness  PSYCH: AAOx3, normal affect  SKIN: jaundice    .  LABS:                         8.8    8.32  )-----------( 80       ( 19 Jul 2020 09:22 )             28.4     07-19    136  |  101  |  27<H>  ----------------------------<  125<H>  4.3   |  25  |  0.77    Ca    8.7      19 Jul 2020 09:22  Phos  2.9     07-18  Mg     2.3     07-18    TPro  6.4  /  Alb  2.5<L>  /  TBili  23.4<H>  /  DBili  x   /  AST  99<H>  /  ALT  51<H>  /  AlkPhos  106  07-19    PT/INR - ( 18 Jul 2020 01:22 )   PT: 32.3 sec;   INR: 2.85 ratio         PTT - ( 18 Jul 2020 01:22 )  PTT:50.1 sec          RADIOLOGY, EKG & ADDITIONAL TESTS: Reviewed.

## 2020-07-20 LAB
ALBUMIN SERPL ELPH-MCNC: 2.5 G/DL — LOW (ref 3.3–5)
ALP SERPL-CCNC: 96 U/L — SIGNIFICANT CHANGE UP (ref 40–120)
ALT FLD-CCNC: 57 U/L — HIGH (ref 10–45)
ANION GAP SERPL CALC-SCNC: 7 MMOL/L — SIGNIFICANT CHANGE UP (ref 5–17)
APTT BLD: 43.2 SEC — HIGH (ref 27.5–35.5)
AST SERPL-CCNC: 105 U/L — HIGH (ref 10–40)
BILIRUB SERPL-MCNC: 22.3 MG/DL — HIGH (ref 0.2–1.2)
BUN SERPL-MCNC: 24 MG/DL — HIGH (ref 7–23)
CALCIUM SERPL-MCNC: 8.7 MG/DL — SIGNIFICANT CHANGE UP (ref 8.4–10.5)
CHLORIDE SERPL-SCNC: 100 MMOL/L — SIGNIFICANT CHANGE UP (ref 96–108)
CO2 SERPL-SCNC: 27 MMOL/L — SIGNIFICANT CHANGE UP (ref 22–31)
CREAT SERPL-MCNC: 0.7 MG/DL — SIGNIFICANT CHANGE UP (ref 0.5–1.3)
GLUCOSE SERPL-MCNC: 99 MG/DL — SIGNIFICANT CHANGE UP (ref 70–99)
HCT VFR BLD CALC: 26.9 % — LOW (ref 39–50)
HGB BLD-MCNC: 8.4 G/DL — LOW (ref 13–17)
INR BLD: 2.66 RATIO — HIGH (ref 0.88–1.16)
MCHC RBC-ENTMCNC: 31.2 GM/DL — LOW (ref 32–36)
MCHC RBC-ENTMCNC: 39.3 PG — HIGH (ref 27–34)
MCV RBC AUTO: 125.7 FL — HIGH (ref 80–100)
NRBC # BLD: 0 /100 WBCS — SIGNIFICANT CHANGE UP (ref 0–0)
PLATELET # BLD AUTO: 77 K/UL — LOW (ref 150–400)
POTASSIUM SERPL-MCNC: 4.5 MMOL/L — SIGNIFICANT CHANGE UP (ref 3.5–5.3)
POTASSIUM SERPL-SCNC: 4.5 MMOL/L — SIGNIFICANT CHANGE UP (ref 3.5–5.3)
PROT SERPL-MCNC: 6.2 G/DL — SIGNIFICANT CHANGE UP (ref 6–8.3)
PROTHROM AB SERPL-ACNC: 30.2 SEC — HIGH (ref 10.6–13.6)
RBC # BLD: 2.14 M/UL — LOW (ref 4.2–5.8)
RBC # FLD: 16.8 % — HIGH (ref 10.3–14.5)
SARS-COV-2 IGG SERPL QL IA: NEGATIVE — SIGNIFICANT CHANGE UP
SARS-COV-2 IGM SERPL IA-ACNC: <3.8 AU/ML — SIGNIFICANT CHANGE UP
SODIUM SERPL-SCNC: 134 MMOL/L — LOW (ref 135–145)
WBC # BLD: 9.8 K/UL — SIGNIFICANT CHANGE UP (ref 3.8–10.5)
WBC # FLD AUTO: 9.8 K/UL — SIGNIFICANT CHANGE UP (ref 3.8–10.5)

## 2020-07-20 PROCEDURE — 76705 ECHO EXAM OF ABDOMEN: CPT | Mod: 26,RT

## 2020-07-20 PROCEDURE — 74183 MRI ABD W/O CNTR FLWD CNTR: CPT | Mod: 26

## 2020-07-20 PROCEDURE — 93975 VASCULAR STUDY: CPT | Mod: 26

## 2020-07-20 PROCEDURE — 99233 SBSQ HOSP IP/OBS HIGH 50: CPT

## 2020-07-20 PROCEDURE — 99232 SBSQ HOSP IP/OBS MODERATE 35: CPT | Mod: GC

## 2020-07-20 RX ADMIN — LACTULOSE 20 GRAM(S): 10 SOLUTION ORAL at 12:09

## 2020-07-20 RX ADMIN — MAGNESIUM OXIDE 400 MG ORAL TABLET 400 MILLIGRAM(S): 241.3 TABLET ORAL at 22:40

## 2020-07-20 RX ADMIN — Medication 1 TABLET(S): at 12:09

## 2020-07-20 RX ADMIN — SPIRONOLACTONE 50 MILLIGRAM(S): 25 TABLET, FILM COATED ORAL at 05:48

## 2020-07-20 RX ADMIN — Medication 5 MILLIGRAM(S): at 12:09

## 2020-07-20 RX ADMIN — Medication 100 MILLIGRAM(S): at 12:09

## 2020-07-20 RX ADMIN — LACTULOSE 20 GRAM(S): 10 SOLUTION ORAL at 22:39

## 2020-07-20 RX ADMIN — Medication 40 MILLIGRAM(S): at 05:47

## 2020-07-20 RX ADMIN — Medication 20 MILLIGRAM(S): at 05:48

## 2020-07-20 RX ADMIN — FAMOTIDINE 20 MILLIGRAM(S): 10 INJECTION INTRAVENOUS at 12:09

## 2020-07-20 RX ADMIN — LACTULOSE 20 GRAM(S): 10 SOLUTION ORAL at 15:27

## 2020-07-20 RX ADMIN — Medication 500 MILLIGRAM(S): at 12:09

## 2020-07-20 RX ADMIN — Medication 1 MILLIGRAM(S): at 12:09

## 2020-07-20 NOTE — SBIRT NOTE ADULT - NSSBIRTALCPASSREFTXDET_GEN_A_CORE
Screening results reviewed with the patient and provided information about potential negative consequences associated with level of risk. Motivation and readiness to reduce or stop use was discussed. Activities to make changes was suggested/offered. Patient declined resources.

## 2020-07-20 NOTE — PROGRESS NOTE ADULT - PROBLEM SELECTOR PLAN 2
Decompensated hepatic cirrhosis w/liver failure. MELD score today is 32.   -c/w rifaximin and lactulose for hepatic encephalopathy   - c/w vitamin k x 3 days per Hepatology  - s/p paracentesis at [place]. fluid studies not suggestive of SBP. BCx and UCx 7/12 NGTD. s/p aztreonam from 7/13-7/14.   -c/w cipro for SBP ppx  -cw lasix and spironolactone daily  -c/w propanolol as secondary ppx for variceal re-bleeding  -seen by transplant hepatology-- abdominal US with dopplers ordered  -currently not a transplant candidate (active etoh use)

## 2020-07-20 NOTE — PROGRESS NOTE ADULT - ASSESSMENT
49 y/o M with a PMHx of alcohol dependence, liver cirrhosis, esophageal varices s/p banding in 2017, ascites s/p paracentesis 1 year ago, IBS, transferred to [facility] for acute on chronic liver failure, and liver transplant evaluation.

## 2020-07-20 NOTE — PHYSICAL THERAPY INITIAL EVALUATION ADULT - ADDITIONAL COMMENTS
Pt resides in private home with his mother and sister. Pt has 2 steps to enter, and a flight of stairs to his bedroom in the basement. Pt reports 3 falls in the past 12 months. Pt has not previously used a walker.

## 2020-07-20 NOTE — PROGRESS NOTE ADULT - ASSESSMENT
Impression: 49 year old man with alcohol abuse/dependence with multiple relapse, history of alcoholic hepatitis, decompensated alcoholic cirrhosis c/b variceal bleed, ascites, peripheral edema who presents from OSH with acute alcoholic hepatitis and decompensated cirrhosis in setting of active alcohol use for transplant evaluation.     #Alcoholic hepatitis with hyperbilirubinemia: on prednisolone currently. Secondary to recent alcohol relapse - admits to drinking quart of vodka daily, last drink on 7/9/20.   Total bilirubin markedly elevated but stable since initial presentation. Improved now. Maddrey's DF score 111.6 on 7/18/20 - started on prednisolone 40 mg daily (day 3).   #Alcohol-related cirrhosis: Decompensated by prior variceal bleed, ascites, peripheral edema and now, PSE. No prior history of SBP. MELD-Na 30 7/18/20  -Ascites: large on exam and moderate on u/s  -Varices: Most recent EGD 3/2019 with Grade II EV, portal hypertensive gastropathy, duodenopathy. On nadolol 40 mg at home.   -HCC: 1cm hypoechoic lesion on u/s from 7/20/20; needs MRI f/u   -PSE: Newly diagnosed PSE at OSH - encephalopathy now improving on lactulose and rifaximin. On rifaximin alone outpatient.   #Anemia, macrocytic: Suspect multifactorial due to anemia of chronic disease vs. bone marrow suppression from alcohol abuse vs. chronic GI blood loss in setting of known EV; No overt bleeding now. Macrocytosis likely secondary to alcohol use. Hb stable since admission to OSH without overt GI bleeding.   #Transplant evaluation: Not a candidate at this point in time due to recent alcohol relapse/active drinking despite awareness of alcoholic related liver disease    Recommendations:  - continue prednisolone 40 mg daily for acute alcoholic hepatitis (Day 3 today)  - calculate Lille score at day 7 to asses for response to steroids  - propanolol 10 BID as secondary ppx  - continue with lasix 40mg and alactone 100mg daily  - please check diagnostic and therapeutic paracentesis; check for ascites cell count, gram stain, culture, protein and albumin  - lactulose 20 g every 4 hours titrated to improvement in mental status/3-4 BMs per day   - CIWA monitoring per primary team however benzodiazepine use can potentially worsen encephalopathy  - continue ciprofloxacin 500 mg daily SBP ppx  - daily INR and CMP for MELD-Na score  - daily MVI, folate, thiamine daily  - Social Work consult tomorrow for alcohol rehab resources/cessation plan  - Hepatology to follow

## 2020-07-20 NOTE — PROGRESS NOTE ADULT - PROBLEM SELECTOR PLAN 3
Chronic ETOH abuse since for >20 years with recent admission to [place] ICU for withdrawal. Drinks 1 quart vodka daily, last drink was 7/9.   -symptom triggered CIWA with Ativan, thiamin, folate

## 2020-07-20 NOTE — PHYSICAL THERAPY INITIAL EVALUATION ADULT - PLANNED THERAPY INTERVENTIONS, PT EVAL
transfer training/Pt will negotiate 8 steps with single railing and independence in 4 weeks/gait training

## 2020-07-20 NOTE — PROGRESS NOTE ADULT - PROBLEM SELECTOR PLAN 1
Elevated Maddrey discriminant function 105, No s/s of SBP or GI bleed. Not in alcohol withdrawal. Diagnostic paracentesis neg  - Hepatology plan noted. c/w prednisolone 40mg daily-- started on 7/13.  - daily coags, CMP  - advised to stop drinking alcohol, PENNY su

## 2020-07-20 NOTE — PROGRESS NOTE ADULT - SUBJECTIVE AND OBJECTIVE BOX
INTERVAL HPI/OVERNIGHT EVENTS:  pt seen and examined  resting in bed  no new complaints  feels weak  ++bm        MEDICATIONS  (STANDING):  enoxaparin Injectable 40 milliGRAM(s) SubCutaneous daily  ergocalciferol 69598 Unit(s) Oral every week  famotidine    Tablet 20 milliGRAM(s) Oral two times a day  folic acid 1 milliGRAM(s) Oral daily  furosemide    Tablet 20 milliGRAM(s) Oral daily  lactulose Syrup 20 Gram(s) Oral four times a day  magnesium oxide 400 milliGRAM(s) Oral daily  multivitamin 1 Tablet(s) Oral daily  prednisoLONE    3 mG/mL Solution (ORAPRED) 40 milliGRAM(s) Oral daily  propranolol 10 milliGRAM(s) Oral every 12 hours  rifAXIMin 550 milliGRAM(s) Oral two times a day  thiamine 100 milliGRAM(s) Oral daily    MEDICATIONS  (PRN):  LORazepam   Injectable 1 milliGRAM(s) IV Push every 2 hours PRN Symptom-triggered: 2 point increase in CIWA -Ar score and a total score of 7 or LESS  ondansetron Injectable 4 milliGRAM(s) IV Push every 6 hours PRN Nausea      Allergies    penicillin (Unknown)    Intolerances        Review of Systems:    see above unable to obtain in entirety     Vital Signs Last 24 Hrs  T(C): 36.8 (17 Jul 2020 07:32), Max: 36.8 (16 Jul 2020 12:31)  T(F): 98.3 (17 Jul 2020 07:32), Max: 98.3 (17 Jul 2020 07:32)  HR: 68 (17 Jul 2020 07:32) (56 - 68)  BP: 114/74 (17 Jul 2020 07:32) (103/61 - 114/74)  BP(mean): --  RR: 17 (17 Jul 2020 07:32) (16 - 19)  SpO2: 99% (17 Jul 2020 07:32) (92% - 99%)    PHYSICAL EXAM:      Constitutional: lying in bed  HEENT: ncat  Gastrointestinal: soft nt +dt  Extremities: mild edema  Vascular: + peripheral pulses  Neurological: lethargic but arousable, appropriate, periods of confusion  Skin: jaundiced, scattered ecchymosis    LABS:                        7.9    9.62  )-----------( 82       ( 16 Jul 2020 07:01 )             24.8     07-16    140  |  104  |  27<H>  ----------------------------<  82  4.0   |  32<H>  |  1.10    Ca    8.3<L>      16 Jul 2020 07:01  Phos  3.7     07-16  Mg     2.3     07-16    TPro  5.9<L>  /  Alb  1.6<L>  /  TBili  21.4<HH>  /  DBili  x   /  AST  85<H>  /  ALT  42  /  AlkPhos  103  07-16    PT/INR - ( 16 Jul 2020 07:01 )   PT: 36.3 sec;   INR: 3.28 ratio               RADIOLOGY & ADDITIONAL TESTS:

## 2020-07-20 NOTE — PROGRESS NOTE ADULT - SUBJECTIVE AND OBJECTIVE BOX
Chief Complaint:  Patient is a 50y old  Male who presents with a chief complaint of liver transplant evaluation (20 Jul 2020 15:33)      Interval Events: No new overnight events. This AM, denies abdominal pain, n/v. Patient without fever or chills.    Allergies:  penicillin (Unknown)      Hospital Medications:  ciprofloxacin     Tablet 500 milliGRAM(s) Oral daily  famotidine    Tablet 20 milliGRAM(s) Oral daily  folic acid 1 milliGRAM(s) Oral daily  furosemide    Tablet 20 milliGRAM(s) Oral daily  lactulose Syrup 20 Gram(s) Oral every 4 hours  LORazepam   Injectable 1 milliGRAM(s) IV Push every 2 hours PRN  LORazepam   Injectable 1 milliGRAM(s) IV Push every 1 hour PRN  magnesium oxide 400 milliGRAM(s) Oral daily  multivitamin 1 Tablet(s) Oral daily  ondansetron Injectable 8 milliGRAM(s) IV Push two times a day PRN  prednisoLONE    3 mG/mL Solution (ORAPRED) 40 milliGRAM(s) Oral daily  propranolol 10 milliGRAM(s) Oral every 12 hours  rifAXIMin 550 milliGRAM(s) Oral two times a day  spironolactone 50 milliGRAM(s) Oral daily  thiamine 100 milliGRAM(s) Oral daily      PMHX/PSHX:  IBS (irritable bowel syndrome)  HTN (hypertension)  Ascites due to alcoholic cirrhosis  Cirrhosis of liver  ETOH abuse  Esophageal varices with bleeding  History of ankle surgery      Family history:  FHx: COPD (chronic obstructive pulmonary disease)  FH: lung cancer      ROS: As per HPI, 14-point ROS negative otherwise.    PHYSICAL EXAM:     Vital Signs:  Vital Signs Last 24 Hrs  T(C): 36.8 (20 Jul 2020 15:47), Max: 36.8 (20 Jul 2020 15:47)  T(F): 98.3 (20 Jul 2020 15:47), Max: 98.3 (20 Jul 2020 15:47)  HR: 72 (20 Jul 2020 15:47) (56 - 72)  BP: 131/85 (20 Jul 2020 15:47) (108/70 - 138/77)  BP(mean): --  RR: 18 (20 Jul 2020 15:47) (18 - 19)  SpO2: 98% (20 Jul 2020 15:47) (95% - 98%)  Daily     Daily     GENERAL:  no acute distress  HEENT: +scleral icterus  CHEST:  no increased effort  HEART:  Regular rate and rhythm  ABDOMEN:  Soft, non-tender, non-distended,  no masses ,no hepato-splenomegaly,   EXTREMITIES:  no cyanosis, clubbing or edema  SKIN: jaundice  NEURO:  Alert, orientedx2, answers questions slowly, tremors on exam    LABS:                        8.4    9.80  )-----------( 77       ( 20 Jul 2020 06:52 )             26.9     07-20    134<L>  |  100  |  24<H>  ----------------------------<  99  4.5   |  27  |  0.70    Ca    8.7      20 Jul 2020 06:52    TPro  6.2  /  Alb  2.5<L>  /  TBili  22.3<H>  /  DBili  x   /  AST  105<H>  /  ALT  57<H>  /  AlkPhos  96  07-20    LIVER FUNCTIONS - ( 20 Jul 2020 06:52 )  Alb: 2.5 g/dL / Pro: 6.2 g/dL / ALK PHOS: 96 U/L / ALT: 57 U/L / AST: 105 U/L / GGT: x           PT/INR - ( 20 Jul 2020 08:26 )   PT: 30.2 sec;   INR: 2.66 ratio         PTT - ( 20 Jul 2020 08:26 )  PTT:43.2 sec        Imaging:    < from: US Abdomen Doppler (07.20.20 @ 10:09) >  IMPRESSION:     Cirrhotic liver. There is a 1 cm hypoechoic lesion in the right lobe should be definitively followed up with further imaging (MR abdomen with intravenous contrast). Also at that time, the mild intrahepatic biliary duct dilatation best appreciated at the left lobe of the liver can be evaluated.    The main and left portal vein demonstrate hepatopedal flow. Recannulized paraumbilical vein. Patent hepatic veins.     Moderate amount of ascites with fine internal echoes.    Sludge-filled gallbladder. Cholelithiasis. Gallbladder wall thickening is likely on the basis of patient's hepatocellular dysfunction. There is no sonographic Gallardo's sign.      < end of copied text >

## 2020-07-20 NOTE — PHYSICAL THERAPY INITIAL EVALUATION ADULT - PERTINENT HX OF CURRENT PROBLEM, REHAB EVAL
49 y/o M with a PMHx of alcohol dependence, liver cirrhosis, esophageal varices s/p banding in 2017, ascites s/p paracentesis 1 year ago, IBS, who is transferred to [facility] for liver transplant eval. Endorses chronic balance difficulties, right hip pain resulting from fall 1 week ago.

## 2020-07-20 NOTE — PROGRESS NOTE ADULT - SUBJECTIVE AND OBJECTIVE BOX
Mohsin Khan, MD  Attending Physician, Division Of Hospital Medicine  Pager: [phone], Office: [phone]  Off hour pager: [phone]    Patient is a 50y old  Male who presents with a chief complaint of liver transplant evaluation     SUBJECTIVE / OVERNIGHT EVENTS:  Seen, examined the patient this am  Sitting in bed, icteric, no fever/chills or abdominal pain  Feels weak, doing ok otherwise, has been having BMs  Hemodynamically stable, no Tele event      MEDICATIONS  (STANDING):  ciprofloxacin     Tablet 500 milliGRAM(s) Oral daily  famotidine    Tablet 20 milliGRAM(s) Oral daily  folic acid 1 milliGRAM(s) Oral daily  furosemide    Tablet 20 milliGRAM(s) Oral daily  lactulose Syrup 20 Gram(s) Oral every 4 hours  magnesium oxide 400 milliGRAM(s) Oral daily  multivitamin 1 Tablet(s) Oral daily  prednisoLONE    3 mG/mL Solution (ORAPRED) 40 milliGRAM(s) Oral daily  propranolol 10 milliGRAM(s) Oral every 12 hours  rifAXIMin 550 milliGRAM(s) Oral two times a day  spironolactone 50 milliGRAM(s) Oral daily  thiamine 100 milliGRAM(s) Oral daily    MEDICATIONS  (PRN):  LORazepam   Injectable 1 milliGRAM(s) IV Push every 2 hours PRN CIWA-Ar score increase by 2 points and a total score of 7 or less  LORazepam   Injectable 1 milliGRAM(s) IV Push every 1 hour PRN CIWA-Ar score 8 or greater  ondansetron Injectable 8 milliGRAM(s) IV Push two times a day PRN Nausea and/or Vomiting      Vital Signs Last 24 Hrs  T(C): 36.5 (20 Jul 2020 05:00), Max: 36.6 (19 Jul 2020 12:31)  T(F): 97.7 (20 Jul 2020 05:00), Max: 97.9 (19 Jul 2020 20:38)  HR: 64 (20 Jul 2020 08:45) (56 - 83)  BP: 108/70 (20 Jul 2020 08:45) (108/70 - 130/76)  BP(mean): --  RR: 18 (20 Jul 2020 05:00) (18 - 19)  SpO2: 97% (20 Jul 2020 08:45) (95% - 98%)  CAPILLARY BLOOD GLUCOSE        I&O's Summary    19 Jul 2020 07:01  -  20 Jul 2020 07:00  --------------------------------------------------------  IN: 740 mL / OUT: 500 mL / NET: 240 mL    20 Jul 2020 07:01  -  20 Jul 2020 12:25  --------------------------------------------------------  IN: 360 mL / OUT: 0 mL / NET: 360 mL        PHYSICAL EXAM:-  GENERAL: NAD, well-developed, Icteric  EYES: EOMI, PERRLA, conjunctiva and sclera clear  NECK: Supple, No JVD, no thyromegaly  CHEST/LUNG: Clear to auscultation bilaterally; No wheeze  HEART: Regular rate and rhythm; S1, S2 audible, No murmurs, rubs, or gallops  ABDOMEN: Soft, Nontender, distended with fluid; no guarding, Bowel sounds present  EXTREMITIES:  2+ Peripheral Pulses, No clubbing, cyanosis, or edema  NEURO: AAOx3, no focal deficit      LABS:                        8.4    9.80  )-----------( 77       ( 20 Jul 2020 06:52 )             26.9     07-20    134<L>  |  100  |  24<H>  ----------------------------<  99  4.5   |  27  |  0.70    Ca    8.7      20 Jul 2020 06:52    TPro  6.2  /  Alb  2.5<L>  /  TBili  22.3<H>  /  DBili  x   /  AST  105<H>  /  ALT  57<H>  /  AlkPhos  96  07-20    PT/INR - ( 20 Jul 2020 08:26 )   PT: 30.2 sec;   INR: 2.66 ratio    PTT - ( 20 Jul 2020 08:26 )  PTT:43.2 sec      RADIOLOGY & ADDITIONAL TESTS:    Imaging Personally Reviewed: US abd, CXR  Consultant(s) Notes Reviewed: GI, Hepatology  Care Discussed with Consultants/Other Providers: GI, Hepatology

## 2020-07-21 ENCOUNTER — RESULT REVIEW (OUTPATIENT)
Age: 50
End: 2020-07-21

## 2020-07-21 DIAGNOSIS — C22.0 LIVER CELL CARCINOMA: ICD-10-CM

## 2020-07-21 LAB
ALBUMIN FLD-MCNC: <0.3 G/DL — SIGNIFICANT CHANGE UP
ALBUMIN SERPL ELPH-MCNC: 2.3 G/DL — LOW (ref 3.3–5)
ALP SERPL-CCNC: 100 U/L — SIGNIFICANT CHANGE UP (ref 40–120)
ALT FLD-CCNC: 62 U/L — HIGH (ref 10–45)
ANION GAP SERPL CALC-SCNC: 7 MMOL/L — SIGNIFICANT CHANGE UP (ref 5–17)
APTT BLD: 41.6 SEC — HIGH (ref 27.5–35.5)
AST SERPL-CCNC: 110 U/L — HIGH (ref 10–40)
B PERT IGG+IGM PNL SER: CLEAR — SIGNIFICANT CHANGE UP
BILIRUB SERPL-MCNC: 21.1 MG/DL — HIGH (ref 0.2–1.2)
BUN SERPL-MCNC: 25 MG/DL — HIGH (ref 7–23)
CALCIUM SERPL-MCNC: 8.8 MG/DL — SIGNIFICANT CHANGE UP (ref 8.4–10.5)
CHLORIDE SERPL-SCNC: 99 MMOL/L — SIGNIFICANT CHANGE UP (ref 96–108)
CO2 SERPL-SCNC: 28 MMOL/L — SIGNIFICANT CHANGE UP (ref 22–31)
COLOR FLD: YELLOW — SIGNIFICANT CHANGE UP
CREAT SERPL-MCNC: 0.75 MG/DL — SIGNIFICANT CHANGE UP (ref 0.5–1.3)
FLUID INTAKE SUBSTANCE CLASS: SIGNIFICANT CHANGE UP
FLUID SEGMENTED GRANULOCYTES: 2 % — SIGNIFICANT CHANGE UP
GLUCOSE FLD-MCNC: 130 MG/DL — SIGNIFICANT CHANGE UP
GLUCOSE SERPL-MCNC: 100 MG/DL — HIGH (ref 70–99)
HCT VFR BLD CALC: 25.1 % — LOW (ref 39–50)
HGB BLD-MCNC: 7.8 G/DL — LOW (ref 13–17)
INR BLD: 2.71 RATIO — HIGH (ref 0.88–1.16)
LDH SERPL L TO P-CCNC: 76 U/L — SIGNIFICANT CHANGE UP
LYMPHOCYTES # FLD: 66 % — SIGNIFICANT CHANGE UP
MCHC RBC-ENTMCNC: 31.1 GM/DL — LOW (ref 32–36)
MCHC RBC-ENTMCNC: 39.4 PG — HIGH (ref 27–34)
MCV RBC AUTO: 126.8 FL — HIGH (ref 80–100)
MESOTHL CELL # FLD: 15 % — SIGNIFICANT CHANGE UP
MONOS+MACROS # FLD: 17 % — SIGNIFICANT CHANGE UP
NRBC # BLD: 1 /100 WBCS — HIGH (ref 0–0)
OB PNL STL: NEGATIVE — SIGNIFICANT CHANGE UP
PLATELET # BLD AUTO: 62 K/UL — LOW (ref 150–400)
POTASSIUM SERPL-MCNC: 4.2 MMOL/L — SIGNIFICANT CHANGE UP (ref 3.5–5.3)
POTASSIUM SERPL-SCNC: 4.2 MMOL/L — SIGNIFICANT CHANGE UP (ref 3.5–5.3)
PROT FLD-MCNC: <0.6 G/DL — SIGNIFICANT CHANGE UP
PROT SERPL-MCNC: 6.1 G/DL — SIGNIFICANT CHANGE UP (ref 6–8.3)
PROTHROM AB SERPL-ACNC: 30.8 SEC — HIGH (ref 10.6–13.6)
RBC # BLD: 1.98 M/UL — LOW (ref 4.2–5.8)
RBC # FLD: 17.1 % — HIGH (ref 10.3–14.5)
RCV VOL RI: 610 /UL — HIGH (ref 0–0)
SODIUM SERPL-SCNC: 134 MMOL/L — LOW (ref 135–145)
TOTAL NUCLEATED CELL COUNT, BODY FLUID: 23 /UL — SIGNIFICANT CHANGE UP
TUBE TYPE: SIGNIFICANT CHANGE UP
WBC # BLD: 11.13 K/UL — HIGH (ref 3.8–10.5)
WBC # FLD AUTO: 11.13 K/UL — HIGH (ref 3.8–10.5)

## 2020-07-21 PROCEDURE — 99232 SBSQ HOSP IP/OBS MODERATE 35: CPT | Mod: GC

## 2020-07-21 PROCEDURE — 88305 TISSUE EXAM BY PATHOLOGIST: CPT | Mod: 26

## 2020-07-21 PROCEDURE — 88112 CYTOPATH CELL ENHANCE TECH: CPT | Mod: 26

## 2020-07-21 PROCEDURE — 99233 SBSQ HOSP IP/OBS HIGH 50: CPT

## 2020-07-21 PROCEDURE — 49083 ABD PARACENTESIS W/IMAGING: CPT

## 2020-07-21 RX ADMIN — MAGNESIUM OXIDE 400 MG ORAL TABLET 400 MILLIGRAM(S): 241.3 TABLET ORAL at 18:25

## 2020-07-21 RX ADMIN — Medication 1 MILLIGRAM(S): at 12:52

## 2020-07-21 RX ADMIN — FAMOTIDINE 20 MILLIGRAM(S): 10 INJECTION INTRAVENOUS at 12:52

## 2020-07-21 RX ADMIN — SPIRONOLACTONE 50 MILLIGRAM(S): 25 TABLET, FILM COATED ORAL at 05:59

## 2020-07-21 RX ADMIN — Medication 1 TABLET(S): at 12:52

## 2020-07-21 RX ADMIN — LACTULOSE 20 GRAM(S): 10 SOLUTION ORAL at 10:29

## 2020-07-21 RX ADMIN — Medication 20 MILLIGRAM(S): at 05:57

## 2020-07-21 RX ADMIN — LACTULOSE 20 GRAM(S): 10 SOLUTION ORAL at 13:04

## 2020-07-21 RX ADMIN — LACTULOSE 20 GRAM(S): 10 SOLUTION ORAL at 21:17

## 2020-07-21 RX ADMIN — Medication 500 MILLIGRAM(S): at 12:52

## 2020-07-21 RX ADMIN — Medication 40 MILLIGRAM(S): at 05:58

## 2020-07-21 RX ADMIN — Medication 100 MILLIGRAM(S): at 12:52

## 2020-07-21 RX ADMIN — LACTULOSE 20 GRAM(S): 10 SOLUTION ORAL at 05:57

## 2020-07-21 RX ADMIN — LACTULOSE 20 GRAM(S): 10 SOLUTION ORAL at 18:25

## 2020-07-21 RX ADMIN — LACTULOSE 20 GRAM(S): 10 SOLUTION ORAL at 02:46

## 2020-07-21 NOTE — CONSULT NOTE ADULT - ATTENDING COMMENTS
AS above, I reviewed imaging and clinical data and agree with appropriateness of paracentesis.  Will attempt to schedule within 24 to 48 hours.  The patient does NOT have to be NPO for this procedure.
Patient seen and examined with liver team. I agree with above. He is not a candidate for liver transplantation at this time. I would continue treatment as above for ETOH hepatitis. He will need outpatient ETOH rehab

## 2020-07-21 NOTE — PROGRESS NOTE ADULT - ASSESSMENT
Impression: 49 year old man with alcohol abuse/dependence with multiple relapse, history of alcoholic hepatitis, decompensated alcoholic cirrhosis c/b variceal bleed, ascites, peripheral edema who presents from OSH with acute alcoholic hepatitis and decompensated cirrhosis in setting of active alcohol use for transplant evaluation.     #Alcoholic hepatitis with hyperbilirubinemia: was on prednisolone with poor response. Secondary to recent alcohol relapse - admits to drinking quart of vodka daily, last drink on 7/9/20.   Total bilirubin markedly elevated but slight improvement today. INR stable. Maddrey's DF score was elevated prior  #Alcohol-related cirrhosis: Decompensated by prior variceal bleed, ascites, peripheral edema and now, PSE. No prior history of SBP. MELD-Na 30 7/18/20  -Ascites: large on exam and moderate on u/s  -Varices: Most recent EGD 3/2019 with Grade II EV, portal hypertensive gastropathy, duodenopathy. On beta blocker  -HCC: small 1.3 cm lesion LIRADS 5 c/f HCC  -PSE: Newly diagnosed PSE at OSH - encephalopathy now improving on lactulose and rifaximin. On rifaximin alone outpatient.   #HCC: LIRADS 5 lesion on MRI. At this time, patient not likely candidate for liver directed therapy given acute issues with AMS and alc hepatitis.   #Anemia, macrocytic: Suspect multifactorial due to anemia of chronic disease vs. bone marrow suppression from alcohol abuse vs. chronic GI blood loss in setting of known EV; No overt bleeding now. Macrocytosis likely secondary to alcohol use. Hb stable since admission to OSH without overt GI bleeding.   #Transplant evaluation: Not a candidate at this point in time due to recent alcohol relapse/active drinking despite awareness of alcoholic related liver disease    Recommendations:  - discontinue prednisolone  - continue with propanolol 10 BID as secondary ppx  - increase to lasix 40mg and aldactone 100mg daily  - please check diagnostic and therapeutic paracentesis; check for ascites cell count, gram stain, culture, protein and albumin  - lactulose 20 g every 4 hours; titrate to ensure 3-4 BMs per day   - Xifaxan BID for HE  - CIWA monitoring per primary team  - continue ciprofloxacin 500 mg daily SBP ppx  - daily INR and CMP for MELD-Na score  - daily MVI, folate, thiamine daily  - Social Work consult tomorrow for alcohol rehab resources/cessation plan  - Hepatology to follow

## 2020-07-21 NOTE — PROGRESS NOTE ADULT - ASSESSMENT
49 year old man with alcohol abuse/dependence with multiple relapse, history of alcoholic hepatitis, decompensated alcoholic cirrhosis c/b variceal bleed, ascites, peripheral edema who presents from OSH with acute alcoholic hepatitis and decompensated cirrhosis in setting of active alcohol use for transplant evaluation.     NPO status: N/A  Anticoagulation: None   Antibiotics: Cipro    Allergies: penicillin (Unknown)      PAST MEDICAL & SURGICAL HISTORY:  IBS (irritable bowel syndrome)  HTN (hypertension)  Ascites due to alcoholic cirrhosis  Cirrhosis of liver  ETOH abuse  Esophageal varices with bleedin2017  History of ankle surgery        Pertinent labs:                      7.8    11.13 )-----------( 62       ( 2020 06:38 )             25.1   07-21    134<L>  |  99  |  25<H>  ----------------------------<  100<H>  4.2   |  28  |  0.75    Ca    8.8      2020 06:38    TPro  6.1  /  Alb  2.3<L>  /  TBili  21.1<H>  /  DBili  x   /  AST  110<H>  /  ALT  62<H>  /  AlkPhos  100  07-21  PT/INR - ( 2020 10:28 )   PT: 30.8 sec;   INR: 2.71 ratio         PTT - ( 2020 10:28 )  PTT:41.6 sec    Consent: Procedure/risks/ Benefits explained. Informed consent obtained. Pt verbalizes understanding.

## 2020-07-21 NOTE — PROGRESS NOTE ADULT - PROBLEM SELECTOR PLAN 1
Elevated Maddrey discriminant function 105, No s/s of SBP or GI bleed. Not in alcohol withdrawal. Diagnostic paracentesis neg  - Hepatology plan noted. c/w prednisolone 40mg daily-- started on 7/18. Last day 8/14. Check Lille score 7/24  - daily coags, CMP  - advised to stop drinking alcohol, PENNY su Elevated Maddrey discriminant function 105, No s/s of SBP or GI bleed. Not in alcohol withdrawal. Diagnostic paracentesis neg  - Hepatology plan noted. c/w prednisolone 40mg daily-- started on 7/13 at OSH. Last day 8/9.   - daily coags, CMP  - advised to stop drinking alcohol, PENNY su Elevated Maddrey discriminant function 105, No s/s of SBP or GI bleed. Not in alcohol withdrawal. Diagnostic paracentesis neg  - dc prednisolone due to poor response  - daily coags, CMP, continue monitoring  - advised to stop drinking alcohol, PENNY su

## 2020-07-21 NOTE — PROGRESS NOTE ADULT - SUBJECTIVE AND OBJECTIVE BOX
INTERVAL HPI/OVERNIGHT EVENTS:  pt seen and examined  resting in bed  no new complaints          MEDICATIONS  (STANDING):  enoxaparin Injectable 40 milliGRAM(s) SubCutaneous daily  ergocalciferol 85022 Unit(s) Oral every week  famotidine    Tablet 20 milliGRAM(s) Oral two times a day  folic acid 1 milliGRAM(s) Oral daily  furosemide    Tablet 20 milliGRAM(s) Oral daily  lactulose Syrup 20 Gram(s) Oral four times a day  magnesium oxide 400 milliGRAM(s) Oral daily  multivitamin 1 Tablet(s) Oral daily  prednisoLONE    3 mG/mL Solution (ORAPRED) 40 milliGRAM(s) Oral daily  propranolol 10 milliGRAM(s) Oral every 12 hours  rifAXIMin 550 milliGRAM(s) Oral two times a day  thiamine 100 milliGRAM(s) Oral daily    MEDICATIONS  (PRN):  LORazepam   Injectable 1 milliGRAM(s) IV Push every 2 hours PRN Symptom-triggered: 2 point increase in CIWA -Ar score and a total score of 7 or LESS  ondansetron Injectable 4 milliGRAM(s) IV Push every 6 hours PRN Nausea      Allergies    penicillin (Unknown)    Intolerances        Review of Systems:    see above unable to obtain in entirety     Vital Signs Last 24 Hrs  T(C): 36.8 (17 Jul 2020 07:32), Max: 36.8 (16 Jul 2020 12:31)  T(F): 98.3 (17 Jul 2020 07:32), Max: 98.3 (17 Jul 2020 07:32)  HR: 68 (17 Jul 2020 07:32) (56 - 68)  BP: 114/74 (17 Jul 2020 07:32) (103/61 - 114/74)  BP(mean): --  RR: 17 (17 Jul 2020 07:32) (16 - 19)  SpO2: 99% (17 Jul 2020 07:32) (92% - 99%)    PHYSICAL EXAM:      Constitutional: lying in bed  HEENT: ncat  Gastrointestinal: soft nt +dt  Extremities: mild edema  Vascular: + peripheral pulses  Neurological: lethargic but arousable, appropriate, periods of confusion  Skin: jaundiced, scattered ecchymosis    LABS:                        7.9    9.62  )-----------( 82       ( 16 Jul 2020 07:01 )             24.8     07-16    140  |  104  |  27<H>  ----------------------------<  82  4.0   |  32<H>  |  1.10    Ca    8.3<L>      16 Jul 2020 07:01  Phos  3.7     07-16  Mg     2.3     07-16    TPro  5.9<L>  /  Alb  1.6<L>  /  TBili  21.4<HH>  /  DBili  x   /  AST  85<H>  /  ALT  42  /  AlkPhos  103  07-16    PT/INR - ( 16 Jul 2020 07:01 )   PT: 36.3 sec;   INR: 3.28 ratio               RADIOLOGY & ADDITIONAL TESTS:

## 2020-07-21 NOTE — PROGRESS NOTE ADULT - SUBJECTIVE AND OBJECTIVE BOX
Meir Grigsby MD  Division of Hospital Medicine  Pager: [phone]  If no response or off-hours, page [phone]  -------------------------------------    Patient is a 50y old  Male who presents with a chief complaint of liver transplant evaluation (20 Jul 2020 16:12)      SUBJECTIVE / OVERNIGHT EVENTS: none acute  ADDITIONAL REVIEW OF SYSTEMS: pt feels overall well. No aches/pains, no fevers/chills. NO n/v.     MEDICATIONS  (STANDING):  ciprofloxacin     Tablet 500 milliGRAM(s) Oral daily  famotidine    Tablet 20 milliGRAM(s) Oral daily  folic acid 1 milliGRAM(s) Oral daily  furosemide    Tablet 20 milliGRAM(s) Oral daily  lactulose Syrup 20 Gram(s) Oral every 4 hours  magnesium oxide 400 milliGRAM(s) Oral daily  multivitamin 1 Tablet(s) Oral daily  prednisoLONE    3 mG/mL Solution (ORAPRED) 40 milliGRAM(s) Oral daily  propranolol 10 milliGRAM(s) Oral every 12 hours  rifAXIMin 550 milliGRAM(s) Oral two times a day  spironolactone 50 milliGRAM(s) Oral daily  thiamine 100 milliGRAM(s) Oral daily    MEDICATIONS  (PRN):  LORazepam   Injectable 1 milliGRAM(s) IV Push every 2 hours PRN CIWA-Ar score increase by 2 points and a total score of 7 or less  LORazepam   Injectable 1 milliGRAM(s) IV Push every 1 hour PRN CIWA-Ar score 8 or greater  ondansetron Injectable 8 milliGRAM(s) IV Push two times a day PRN Nausea and/or Vomiting      CAPILLARY BLOOD GLUCOSE        I&O's Summary    20 Jul 2020 07:01  -  21 Jul 2020 07:00  --------------------------------------------------------  IN: 960 mL / OUT: 0 mL / NET: 960 mL        PHYSICAL EXAM:  Vital Signs Last 24 Hrs  T(C): 36.5 (21 Jul 2020 06:06), Max: 36.8 (20 Jul 2020 15:47)  T(F): 97.7 (21 Jul 2020 06:06), Max: 98.3 (20 Jul 2020 15:47)  HR: 60 (21 Jul 2020 06:06) (60 - 73)  BP: 120/78 (21 Jul 2020 06:06) (110/66 - 145/85)  BP(mean): --  RR: 17 (21 Jul 2020 06:06) (17 - 18)  SpO2: 97% (21 Jul 2020 06:06) (95% - 99%)  CONSTITUTIONAL: NAD, well-developed, well-groomed  EYES: PERRLA; conjunctiva and sclera clear, icteric  ENMT: Moist oral mucosa, no pharyngeal injection or exudates; normal dentition  NECK: Supple, no palpable masses; no thyromegaly  RESPIRATORY: Normal respiratory effort; lungs are clear to auscultation bilaterally  CARDIOVASCULAR: Regular rate and rhythm, normal S1 and S2, no murmur/rub/gallop; No lower extremity edema; Peripheral pulses are 2+ bilaterally  ABDOMEN: Nontender to palpation, normoactive bowel sounds, no rebound/guarding; softly distended  MUSCLOSKELETAL:  Normal gait; no clubbing or cyanosis of digits; no joint swelling or tenderness to palpation  PSYCH: A+O to person, place, and time; affect appropriate  NEUROLOGY: CN 2-12 are intact and symmetric; no gross sensory deficits;   SKIN: No rashes; no palpable lesions, jaundice    LABS:                        7.8    11.13 )-----------( 62       ( 21 Jul 2020 06:38 )             25.1     07-21    134<L>  |  99  |  25<H>  ----------------------------<  100<H>  4.2   |  28  |  0.75    Ca    8.8      21 Jul 2020 06:38    TPro  6.1  /  Alb  2.3<L>  /  TBili  21.1<H>  /  DBili  x   /  AST  110<H>  /  ALT  62<H>  /  AlkPhos  100  07-21    PT/INR - ( 20 Jul 2020 08:26 )   PT: 30.2 sec;   INR: 2.66 ratio         PTT - ( 20 Jul 2020 08:26 )  PTT:43.2 sec            RADIOLOGY & ADDITIONAL TESTS:  Results Reviewed:   Imaging Personally Reviewed:  Electrocardiogram Personally Reviewed:    COORDINATION OF CARE:  Care Discussed with Consultants/Other Providers [Y/N]:  Prior or Outpatient Records Reviewed [Y/N]: Meir Grigsby MD  Division of Hospital Medicine  Pager: [phone]  If no response or off-hours, page [phone]  -------------------------------------    Patient is a 50y old  Male who presents with a chief complaint of liver transplant evaluation (20 Jul 2020 16:12)      SUBJECTIVE / OVERNIGHT EVENTS: none acute  ADDITIONAL REVIEW OF SYSTEMS: pt feels overall well. No aches/pains, no fevers/chills. NO n/v.     MEDICATIONS  (STANDING):  ciprofloxacin     Tablet 500 milliGRAM(s) Oral daily  famotidine    Tablet 20 milliGRAM(s) Oral daily  folic acid 1 milliGRAM(s) Oral daily  furosemide    Tablet 20 milliGRAM(s) Oral daily  lactulose Syrup 20 Gram(s) Oral every 4 hours  magnesium oxide 400 milliGRAM(s) Oral daily  multivitamin 1 Tablet(s) Oral daily  prednisoLONE    3 mG/mL Solution (ORAPRED) 40 milliGRAM(s) Oral daily  propranolol 10 milliGRAM(s) Oral every 12 hours  rifAXIMin 550 milliGRAM(s) Oral two times a day  spironolactone 50 milliGRAM(s) Oral daily  thiamine 100 milliGRAM(s) Oral daily    MEDICATIONS  (PRN):  LORazepam   Injectable 1 milliGRAM(s) IV Push every 2 hours PRN CIWA-Ar score increase by 2 points and a total score of 7 or less  LORazepam   Injectable 1 milliGRAM(s) IV Push every 1 hour PRN CIWA-Ar score 8 or greater  ondansetron Injectable 8 milliGRAM(s) IV Push two times a day PRN Nausea and/or Vomiting      CAPILLARY BLOOD GLUCOSE        I&O's Summary    20 Jul 2020 07:01  -  21 Jul 2020 07:00  --------------------------------------------------------  IN: 960 mL / OUT: 0 mL / NET: 960 mL        PHYSICAL EXAM:  Vital Signs Last 24 Hrs  T(C): 36.5 (21 Jul 2020 06:06), Max: 36.8 (20 Jul 2020 15:47)  T(F): 97.7 (21 Jul 2020 06:06), Max: 98.3 (20 Jul 2020 15:47)  HR: 60 (21 Jul 2020 06:06) (60 - 73)  BP: 120/78 (21 Jul 2020 06:06) (110/66 - 145/85)  BP(mean): --  RR: 17 (21 Jul 2020 06:06) (17 - 18)  SpO2: 97% (21 Jul 2020 06:06) (95% - 99%)  CONSTITUTIONAL: NAD, well-developed, well-groomed  EYES: PERRLA; conjunctiva and sclera clear, icteric  ENMT: Moist oral mucosa, no pharyngeal injection or exudates; normal dentition  NECK: Supple, no palpable masses; no thyromegaly  RESPIRATORY: Normal respiratory effort; lungs are clear to auscultation bilaterally  CARDIOVASCULAR: Regular rate and rhythm, normal S1 and S2, no murmur/rub/gallop; No lower extremity edema; Peripheral pulses are 2+ bilaterally  ABDOMEN: Nontender to palpation, normoactive bowel sounds, no rebound/guarding; softly distended  MUSCLOSKELETAL:  Normal gait; no clubbing or cyanosis of digits; no joint swelling or tenderness to palpation  PSYCH: A+O to person, place, and time; affect appropriate  NEUROLOGY: CN 2-12 are intact and symmetric; no gross sensory deficits;   SKIN: No rashes; no palpable lesions, jaundice    LABS:                        7.8    11.13 )-----------( 62       ( 21 Jul 2020 06:38 )             25.1     07-21    134<L>  |  99  |  25<H>  ----------------------------<  100<H>  4.2   |  28  |  0.75    Ca    8.8      21 Jul 2020 06:38    TPro  6.1  /  Alb  2.3<L>  /  TBili  21.1<H>  /  DBili  x   /  AST  110<H>  /  ALT  62<H>  /  AlkPhos  100  07-21    PT/INR - ( 20 Jul 2020 08:26 )   PT: 30.2 sec;   INR: 2.66 ratio         PTT - ( 20 Jul 2020 08:26 )  PTT:43.2 sec            RADIOLOGY & ADDITIONAL TESTS:  Results Reviewed:   Imaging Personally Reviewed:  Electrocardiogram Personally Reviewed:    COORDINATION OF CARE:  Care Discussed with Consultants/Other Providers [Y/N]: Dr. Drew  Prior or Outpatient Records Reviewed [Y/N]:

## 2020-07-21 NOTE — PROGRESS NOTE ADULT - PROBLEM SELECTOR PLAN 4
No reported bleeding events. Hb 8.4  -stool for occult stool ordered.  - GI plan noted Chronic ETOH abuse since for >20 years with recent admission to [facility] ICU for withdrawal. Drinks 1 quart vodka daily, last drink was 7/9.   -symptom triggered CIWA with Ativan, thiamin, folate- CIWA 1

## 2020-07-21 NOTE — PROGRESS NOTE ADULT - PROBLEM SELECTOR PLAN 2
Decompensated hepatic cirrhosis w/liver failure. MELD score today is 32.   -c/w rifaximin and lactulose for hepatic encephalopathy   - c/w vitamin k x 3 days per Hepatology  - s/p paracentesis at [place]. fluid studies not suggestive of SBP. BCx and UCx 7/12 NGTD. s/p aztreonam from 7/13-7/14.   -c/w cipro for SBP ppx  -cw lasix and spironolactone daily  -c/w propanolol as secondary ppx for variceal re-bleeding  -seen by transplant hepatology-- abdominal US with dopplers negative  -currently not a transplant candidate (active etoh use)  - f/u IR dx/tx paracentesis and fluid analysis Decompensated hepatic cirrhosis w/liver failure. MELD score today is 32.   -c/w rifaximin and lactulose for hepatic encephalopathy   - c/w vitamin k x 3 days per Hepatology  - s/p paracentesis at [facility]. fluid studies not suggestive of SBP. BCx and UCx 7/12 NGTD. s/p aztreonam from 7/13-7/14. f/u repeat dx/tx paracentesis here  -c/w cipro for SBP ppx  -cw lasix and spironolactone daily  -c/w propanolol as secondary ppx for variceal re-bleeding  -seen by transplant hepatology-- abdominal US with dopplers negative  -currently not a transplant candidate (active etoh use)

## 2020-07-21 NOTE — PROGRESS NOTE ADULT - PROBLEM SELECTOR PLAN 5
S/p bleeding esophageal varices banding in 2017  -c/w propranolol with hold parameters No reported bleeding events. Hb 8.4  -stool for occult stool ordered.  - GI plan noted

## 2020-07-21 NOTE — CONSULT NOTE ADULT - SUBJECTIVE AND OBJECTIVE BOX
Vascular & Interventional Radiology Brief Consult Note    Evaluate for Procedure: diagnostic and therapeutic paracentesis     HPI: 50y Male with ETOH cirrhosis presents for transplant evaluation. IR is consulted for Diagnostic and therapeutic paracentesis.     Allergies: penicillin (Unknown)    Medications (Abx/Cardiac/Anticoagulation/Blood Products)  ciprofloxacin     Tablet: 500 milliGRAM(s) Oral (07-20 @ 12:09)  furosemide    Tablet: 20 milliGRAM(s) Oral (07-21 @ 05:57)  propranolol: 10 milliGRAM(s) Oral (07-21 @ 10:29)  rifAXIMin: 550 milliGRAM(s) Oral (07-21 @ 10:29)  spironolactone: 50 milliGRAM(s) Oral (07-21 @ 05:59)    Data:    T(C): 36.4  HR: 65  BP: 115/73  RR: 17  SpO2: 94%    -WBC 11.13 / HgB 7.8 / Hct 25.1 / Plt 62  -Na 134 / Cl 99 / BUN 25 / Glucose 100  -K 4.2 / CO2 28 / Cr 0.75  -ALT 62 / Alk Phos 100 / T.Bili 21.1  -INR2.71        Plan:  -Please place order for IR Procedure, approving attending Dr. Deleon  -hold therpaeutic and prophylactic anticoagulants  -maintain active type and screen x 2

## 2020-07-21 NOTE — PROGRESS NOTE ADULT - PROBLEM SELECTOR PLAN 3
Chronic ETOH abuse since for >20 years with recent admission to [place] ICU for withdrawal. Drinks 1 quart vodka daily, last drink was 7/9.   -symptom triggered CIWA with Ativan, thiamin, folate- CIWA 1 Noted on MRI to have LIRADS 5 lesion 1.3cm. Dx discussed with patient at bedside.   - based on today's labs, patient is child castro class C cirrhosis, making him stage D. Will d/w hepatology expected prognosis and next steps. Noted on MRI to have LIRADS 5 lesion 1.3cm. Dx discussed with patient at bedside.   - based on today's labs, patient is child castro class C cirrhosis, making him stage D.   - not presently a transplant candidate, possible future localized therapies are an option pending overall clinical progress

## 2020-07-21 NOTE — PROGRESS NOTE ADULT - ASSESSMENT
51 y/o M with a PMHx of alcohol dependence, liver cirrhosis, esophageal varices s/p banding in 2017, ascites s/p paracentesis 1 year ago, IBS, transferred to [facility] for acute on chronic liver failure, and liver transplant evaluation. 51 y/o M with a PMHx of alcohol dependence, liver cirrhosis, esophageal varices s/p banding in 2017, ascites s/p paracentesis 1 year ago, IBS, transferred to [facility] for acute on chronic liver failure, deemed not a liver transplant candidate at this time due to poor abstinence history

## 2020-07-21 NOTE — PROGRESS NOTE ADULT - SUBJECTIVE AND OBJECTIVE BOX
Chief Complaint:  Patient is a 50y old  Male who presents with a chief complaint of liver transplant evaluation (21 Jul 2020 11:30)      Interval Events: Feels well this AM. Anxious about MRI results. Otherwise, no n/v. C/o abdominal distension.    Allergies:  penicillin (Unknown)      Hospital Medications:  ciprofloxacin     Tablet 500 milliGRAM(s) Oral daily  famotidine    Tablet 20 milliGRAM(s) Oral daily  folic acid 1 milliGRAM(s) Oral daily  furosemide    Tablet 20 milliGRAM(s) Oral daily  lactulose Syrup 20 Gram(s) Oral every 4 hours  LORazepam   Injectable 1 milliGRAM(s) IV Push every 2 hours PRN  LORazepam   Injectable 1 milliGRAM(s) IV Push every 1 hour PRN  magnesium oxide 400 milliGRAM(s) Oral daily  multivitamin 1 Tablet(s) Oral daily  ondansetron Injectable 8 milliGRAM(s) IV Push two times a day PRN  propranolol 10 milliGRAM(s) Oral every 12 hours  rifAXIMin 550 milliGRAM(s) Oral two times a day  spironolactone 50 milliGRAM(s) Oral daily  thiamine 100 milliGRAM(s) Oral daily      PMHX/PSHX:  IBS (irritable bowel syndrome)  HTN (hypertension)  Ascites due to alcoholic cirrhosis  Cirrhosis of liver  ETOH abuse  Esophageal varices with bleeding  History of ankle surgery      Family history:  FHx: COPD (chronic obstructive pulmonary disease)  FH: lung cancer      ROS: As per HPI, 14-point ROS negative otherwise.  PHYSICAL EXAM:     Vital Signs:  Vital Signs Last 24 Hrs  T(C): 36.4 (21 Jul 2020 10:35), Max: 36.8 (20 Jul 2020 15:47)  T(F): 97.5 (21 Jul 2020 10:35), Max: 98.3 (20 Jul 2020 15:47)  HR: 65 (21 Jul 2020 10:35) (60 - 73)  BP: 115/73 (21 Jul 2020 10:35) (110/66 - 145/85)  BP(mean): --  RR: 17 (21 Jul 2020 10:35) (17 - 18)  SpO2: 94% (21 Jul 2020 10:35) (94% - 99%)  Daily     Daily     GENERAL:  no acute distress  HEENT: +scleral icterus  CHEST:  no increased effort  HEART:  Regular rate and rhythm  ABDOMEN:  Soft, non-tender, + distended,  no masses  EXTREMITIES:  BLLE edema  SKIN: jaundice, LUE ecchymoses over arm  NEURO:  Alert, orientedx3, answers questions slowly, tremors on exam    LABS:                        7.8    11.13 )-----------( 62       ( 21 Jul 2020 06:38 )             25.1     07-21    134<L>  |  99  |  25<H>  ----------------------------<  100<H>  4.2   |  28  |  0.75    Ca    8.8      21 Jul 2020 06:38    TPro  6.1  /  Alb  2.3<L>  /  TBili  21.1<H>  /  DBili  x   /  AST  110<H>  /  ALT  62<H>  /  AlkPhos  100  07-21    LIVER FUNCTIONS - ( 21 Jul 2020 06:38 )  Alb: 2.3 g/dL / Pro: 6.1 g/dL / ALK PHOS: 100 U/L / ALT: 62 U/L / AST: 110 U/L / GGT: x           PT/INR - ( 21 Jul 2020 10:28 )   PT: 30.8 sec;   INR: 2.71 ratio         PTT - ( 21 Jul 2020 10:28 )  PTT:41.6 sec        Imaging:  < from: MR Abdomen w/wo IV Cont (07.20.20 @ 22:16) >  IMPRESSION:   Cirrhosis with portal hypertension.    A 1.3 cm lesion in right hepatic lobe corresponding with finding at recent ultrasound, LI-RADS 5 (definite HCC).    Splenic infarcts.          < end of copied text >

## 2020-07-21 NOTE — PROGRESS NOTE ADULT - SUBJECTIVE AND OBJECTIVE BOX
Interventional Radiology Procedure Note    Procedure:  Status post diagnostic and therapeutic paracentesis with drainage of 5500 cc of turbid yellow fluid, access site was right lower quadrant    Indication: Cirrhosis with moderate ascites on recent imaging, abdominal distention    Operators: Jaylen Paul    Anesthesia (type): 2% subcutaneous lidocaine    Contrast: None    EBL: Minimal    Findings/Follow up Plan of Care: Sample of fluid sent for culture and cytology, f/u results    Specimens Removed: 5500 cc of turbid yellow fluid    Implants: None    Complications: None    Condition/Disposition: In-patient unit    Please call Interventional Radiology x [phone] with any questions, concerns, or issues.

## 2020-07-21 NOTE — PROGRESS NOTE ADULT - PROBLEM SELECTOR PLAN 6
DVT: HSQ  Diet: low K and low protein S/p bleeding esophageal varices banding in 2017  -c/w propranolol with hold parameters

## 2020-07-22 ENCOUNTER — TRANSCRIPTION ENCOUNTER (OUTPATIENT)
Age: 50
End: 2020-07-22

## 2020-07-22 VITALS
OXYGEN SATURATION: 96 % | SYSTOLIC BLOOD PRESSURE: 109 MMHG | DIASTOLIC BLOOD PRESSURE: 69 MMHG | RESPIRATION RATE: 18 BRPM | TEMPERATURE: 98 F | HEART RATE: 62 BPM

## 2020-07-22 LAB
ALBUMIN SERPL ELPH-MCNC: 2.4 G/DL — LOW (ref 3.3–5)
ALP SERPL-CCNC: 99 U/L — SIGNIFICANT CHANGE UP (ref 40–120)
ALT FLD-CCNC: 64 U/L — HIGH (ref 10–45)
ANION GAP SERPL CALC-SCNC: 11 MMOL/L — SIGNIFICANT CHANGE UP (ref 5–17)
APTT BLD: 42.8 SEC — HIGH (ref 27.5–35.5)
AST SERPL-CCNC: 110 U/L — HIGH (ref 10–40)
BILIRUB SERPL-MCNC: 19.5 MG/DL — HIGH (ref 0.2–1.2)
BUN SERPL-MCNC: 24 MG/DL — HIGH (ref 7–23)
CALCIUM SERPL-MCNC: 8.8 MG/DL — SIGNIFICANT CHANGE UP (ref 8.4–10.5)
CHLORIDE SERPL-SCNC: 101 MMOL/L — SIGNIFICANT CHANGE UP (ref 96–108)
CO2 SERPL-SCNC: 24 MMOL/L — SIGNIFICANT CHANGE UP (ref 22–31)
CREAT SERPL-MCNC: 0.71 MG/DL — SIGNIFICANT CHANGE UP (ref 0.5–1.3)
GLUCOSE SERPL-MCNC: 91 MG/DL — SIGNIFICANT CHANGE UP (ref 70–99)
GRAM STN FLD: SIGNIFICANT CHANGE UP
HCT VFR BLD CALC: 28.2 % — LOW (ref 39–50)
HGB BLD-MCNC: 8.9 G/DL — LOW (ref 13–17)
INR BLD: 2.77 RATIO — HIGH (ref 0.88–1.16)
MCHC RBC-ENTMCNC: 31.6 GM/DL — LOW (ref 32–36)
MCHC RBC-ENTMCNC: 39.9 PG — HIGH (ref 27–34)
MCV RBC AUTO: 126.5 FL — HIGH (ref 80–100)
NRBC # BLD: 0 /100 WBCS — SIGNIFICANT CHANGE UP (ref 0–0)
PLATELET # BLD AUTO: 91 K/UL — LOW (ref 150–400)
POTASSIUM SERPL-MCNC: 4 MMOL/L — SIGNIFICANT CHANGE UP (ref 3.5–5.3)
POTASSIUM SERPL-SCNC: 4 MMOL/L — SIGNIFICANT CHANGE UP (ref 3.5–5.3)
PROT SERPL-MCNC: 6 G/DL — SIGNIFICANT CHANGE UP (ref 6–8.3)
PROTHROM AB SERPL-ACNC: 31.4 SEC — HIGH (ref 10.6–13.6)
RBC # BLD: 2.23 M/UL — LOW (ref 4.2–5.8)
RBC # FLD: 17.5 % — HIGH (ref 10.3–14.5)
SODIUM SERPL-SCNC: 136 MMOL/L — SIGNIFICANT CHANGE UP (ref 135–145)
SPECIMEN SOURCE: SIGNIFICANT CHANGE UP
WBC # BLD: 12.64 K/UL — HIGH (ref 3.8–10.5)
WBC # FLD AUTO: 12.64 K/UL — HIGH (ref 3.8–10.5)

## 2020-07-22 PROCEDURE — 88112 CYTOPATH CELL ENHANCE TECH: CPT

## 2020-07-22 PROCEDURE — 71045 X-RAY EXAM CHEST 1 VIEW: CPT

## 2020-07-22 PROCEDURE — 83615 LACTATE (LD) (LDH) ENZYME: CPT

## 2020-07-22 PROCEDURE — 99232 SBSQ HOSP IP/OBS MODERATE 35: CPT | Mod: GC

## 2020-07-22 PROCEDURE — 85610 PROTHROMBIN TIME: CPT

## 2020-07-22 PROCEDURE — 89051 BODY FLUID CELL COUNT: CPT

## 2020-07-22 PROCEDURE — 84157 ASSAY OF PROTEIN OTHER: CPT

## 2020-07-22 PROCEDURE — 74183 MRI ABD W/O CNTR FLWD CNTR: CPT

## 2020-07-22 PROCEDURE — 99233 SBSQ HOSP IP/OBS HIGH 50: CPT

## 2020-07-22 PROCEDURE — 83735 ASSAY OF MAGNESIUM: CPT

## 2020-07-22 PROCEDURE — 87205 SMEAR GRAM STAIN: CPT

## 2020-07-22 PROCEDURE — 85027 COMPLETE CBC AUTOMATED: CPT

## 2020-07-22 PROCEDURE — 80053 COMPREHEN METABOLIC PANEL: CPT

## 2020-07-22 PROCEDURE — 86769 SARS-COV-2 COVID-19 ANTIBODY: CPT

## 2020-07-22 PROCEDURE — P9047: CPT

## 2020-07-22 PROCEDURE — C1729: CPT

## 2020-07-22 PROCEDURE — 86901 BLOOD TYPING SEROLOGIC RH(D): CPT

## 2020-07-22 PROCEDURE — 87075 CULTR BACTERIA EXCEPT BLOOD: CPT

## 2020-07-22 PROCEDURE — 82272 OCCULT BLD FECES 1-3 TESTS: CPT

## 2020-07-22 PROCEDURE — 86850 RBC ANTIBODY SCREEN: CPT

## 2020-07-22 PROCEDURE — 84100 ASSAY OF PHOSPHORUS: CPT

## 2020-07-22 PROCEDURE — A9585: CPT

## 2020-07-22 PROCEDURE — 87070 CULTURE OTHR SPECIMN AEROBIC: CPT

## 2020-07-22 PROCEDURE — 82042 OTHER SOURCE ALBUMIN QUAN EA: CPT

## 2020-07-22 PROCEDURE — 93975 VASCULAR STUDY: CPT

## 2020-07-22 PROCEDURE — 88305 TISSUE EXAM BY PATHOLOGIST: CPT

## 2020-07-22 PROCEDURE — 49083 ABD PARACENTESIS W/IMAGING: CPT

## 2020-07-22 PROCEDURE — 76705 ECHO EXAM OF ABDOMEN: CPT

## 2020-07-22 PROCEDURE — 82945 GLUCOSE OTHER FLUID: CPT

## 2020-07-22 PROCEDURE — 86900 BLOOD TYPING SEROLOGIC ABO: CPT

## 2020-07-22 PROCEDURE — 85730 THROMBOPLASTIN TIME PARTIAL: CPT

## 2020-07-22 PROCEDURE — 97161 PT EVAL LOW COMPLEX 20 MIN: CPT

## 2020-07-22 PROCEDURE — 87102 FUNGUS ISOLATION CULTURE: CPT

## 2020-07-22 RX ORDER — NADOLOL 80 MG/1
1 TABLET ORAL
Qty: 0 | Refills: 0 | DISCHARGE

## 2020-07-22 RX ORDER — PROPRANOLOL HCL 160 MG
1 CAPSULE, EXTENDED RELEASE 24HR ORAL
Qty: 60 | Refills: 0
Start: 2020-07-22 | End: 2020-08-20

## 2020-07-22 RX ADMIN — Medication 500 MILLIGRAM(S): at 11:06

## 2020-07-22 RX ADMIN — Medication 1 MILLIGRAM(S): at 11:06

## 2020-07-22 RX ADMIN — LACTULOSE 20 GRAM(S): 10 SOLUTION ORAL at 05:26

## 2020-07-22 RX ADMIN — SPIRONOLACTONE 50 MILLIGRAM(S): 25 TABLET, FILM COATED ORAL at 05:26

## 2020-07-22 RX ADMIN — LACTULOSE 20 GRAM(S): 10 SOLUTION ORAL at 11:06

## 2020-07-22 RX ADMIN — LACTULOSE 20 GRAM(S): 10 SOLUTION ORAL at 13:45

## 2020-07-22 RX ADMIN — Medication 100 MILLIGRAM(S): at 11:06

## 2020-07-22 RX ADMIN — Medication 20 MILLIGRAM(S): at 05:26

## 2020-07-22 RX ADMIN — Medication 1 TABLET(S): at 11:06

## 2020-07-22 RX ADMIN — FAMOTIDINE 20 MILLIGRAM(S): 10 INJECTION INTRAVENOUS at 11:06

## 2020-07-22 NOTE — DISCHARGE NOTE NURSING/CASE MANAGEMENT/SOCIAL WORK - PATIENT PORTAL LINK FT
You can access the FollowMyHealth Patient Portal offered by [facility] by registering at the following website: [email or web address]. By joining [employer]’s FollowMyHealth portal, you will also be able to view your health information using other applications (apps) compatible with our system.

## 2020-07-22 NOTE — PROGRESS NOTE ADULT - PROBLEM SELECTOR PLAN 4
Chronic ETOH abuse since for >20 years with [facility]. Drinks 1 quart vodka daily, last drink was 7/9.   -symptom triggered CIWA with Ativan, thiamin, folate- CIWA 1

## 2020-07-22 NOTE — DISCHARGE NOTE PROVIDER - CARE PROVIDERS DIRECT ADDRESSES
[email or web address],DirectAddress_Unknown,DirectAddress_Unknown

## 2020-07-22 NOTE — DISCHARGE NOTE PROVIDER - NSDCMRMEDTOKEN_GEN_ALL_CORE_FT
baclofen 10 mg oral tablet: 1 tab(s) orally 3 times a day  ciprofloxacin 500 mg oral tablet: 1 tab(s) orally once a day  famotidine 20 mg oral tablet: 1 tab(s) orally 2 times a day  folic acid 1 mg oral tablet: 1 tab(s) orally once a day  furosemide 20 mg oral tablet: 2 tab(s) orally once a day  lactulose 10 g/15 mL oral syrup: 30 milliliter(s) orally 4 times a day  magnesium oxide 400 mg (241.3 mg elemental magnesium) oral tablet: 1 tab(s) orally once a day  Multiple Vitamins oral tablet: 1 tab(s) orally once a day  nadolol 20 mg oral tablet: 1 tab(s) orally once a day  rifAXIMin 550 mg oral tablet: 1 tab(s) orally 2 times a day  spironolactone 100 mg oral tablet: 1.5 tab(s) orally once a day  thiamine 100 mg oral tablet: 1 tab(s) orally once a day  Ventolin HFA 90 mcg/inh inhalation aerosol: 2 puff(s) inhaled every 6 hours, As Needed baclofen 10 mg oral tablet: 1 tab(s) orally 3 times a day  ciprofloxacin 500 mg oral tablet: 1 tab(s) orally once a day  famotidine 20 mg oral tablet: 1 tab(s) orally 2 times a day  folic acid 1 mg oral tablet: 1 tab(s) orally once a day  furosemide 20 mg oral tablet: 2 tab(s) orally once a day  lactulose 10 g/15 mL oral syrup: 30 milliliter(s) orally 4 times a day  magnesium oxide 400 mg (241.3 mg elemental magnesium) oral tablet: 1 tab(s) orally once a day  Multiple Vitamins oral tablet: 1 tab(s) orally once a day  propranolol 10 mg oral tablet: 1 tab(s) orally every 12 hours  rifAXIMin 550 mg oral tablet: 1 tab(s) orally 2 times a day  spironolactone 100 mg oral tablet: 1.5 tab(s) orally once a day  thiamine 100 mg oral tablet: 1 tab(s) orally once a day  Ventolin HFA 90 mcg/inh inhalation aerosol: 2 puff(s) inhaled every 6 hours, As Needed

## 2020-07-22 NOTE — PROGRESS NOTE ADULT - SUBJECTIVE AND OBJECTIVE BOX
Chief Complaint:  Patient is a 50y old  Male who presents with a chief complaint of liver transplant evaluation (22 Jul 2020 12:50)      Interval Events: Patient feels well today. Feels improved after paracentesis. Denies n/v, abdominal pain.     Allergies:  penicillin (Unknown)      Hospital Medications:  ciprofloxacin     Tablet 500 milliGRAM(s) Oral daily  famotidine    Tablet 20 milliGRAM(s) Oral daily  folic acid 1 milliGRAM(s) Oral daily  furosemide    Tablet 20 milliGRAM(s) Oral daily  lactulose Syrup 20 Gram(s) Oral every 4 hours  LORazepam   Injectable 1 milliGRAM(s) IV Push every 2 hours PRN  LORazepam   Injectable 1 milliGRAM(s) IV Push every 1 hour PRN  magnesium oxide 400 milliGRAM(s) Oral daily  multivitamin 1 Tablet(s) Oral daily  ondansetron Injectable 8 milliGRAM(s) IV Push two times a day PRN  propranolol 10 milliGRAM(s) Oral every 12 hours  rifAXIMin 550 milliGRAM(s) Oral two times a day  spironolactone 50 milliGRAM(s) Oral daily  thiamine 100 milliGRAM(s) Oral daily      PMHX/PSHX:  IBS (irritable bowel syndrome)  HTN (hypertension)  Ascites due to alcoholic cirrhosis  Cirrhosis of liver  ETOH abuse  Esophageal varices with bleeding  History of ankle surgery      Family history:  FHx: COPD (chronic obstructive pulmonary disease)  FH: lung cancer      ROS: As per HPI, 14-point ROS negative otherwise.    PHYSICAL EXAM:     Vital Signs:  Vital Signs Last 24 Hrs  T(C): 36.4 (22 Jul 2020 11:31), Max: 36.7 (21 Jul 2020 19:37)  T(F): 97.5 (22 Jul 2020 11:31), Max: 98 (21 Jul 2020 19:37)  HR: 62 (22 Jul 2020 11:31) (53 - 73)  BP: 109/69 (22 Jul 2020 11:31) (98/63 - 119/77)  BP(mean): --  RR: 18 (22 Jul 2020 11:31) (17 - 18)  SpO2: 96% (22 Jul 2020 11:31) (95% - 99%)  Daily     Daily     GENERAL:  no acute distress  HEENT: +scleral icterus  CHEST:  no increased effort  HEART:  Regular rate and rhythm  ABDOMEN:  Soft, non-tender, mild distended,  no masses  EXTREMITIES:  trace BLLE edema  SKIN: jaundice, LUE ecchymoses over arm  NEURO:  Alert, orientedx3, answers questions without issues, tremors on exam    LABS:                        8.9    12.64 )-----------( 91       ( 22 Jul 2020 07:12 )             28.2     07-22    136  |  101  |  24<H>  ----------------------------<  91  4.0   |  24  |  0.71    Ca    8.8      22 Jul 2020 07:12    TPro  6.0  /  Alb  2.4<L>  /  TBili  19.5<H>  /  DBili  x   /  AST  110<H>  /  ALT  64<H>  /  AlkPhos  99  07-22    LIVER FUNCTIONS - ( 22 Jul 2020 07:12 )  Alb: 2.4 g/dL / Pro: 6.0 g/dL / ALK PHOS: 99 U/L / ALT: 64 U/L / AST: 110 U/L / GGT: x           PT/INR - ( 22 Jul 2020 08:20 )   PT: 31.4 sec;   INR: 2.77 ratio         PTT - ( 22 Jul 2020 08:20 )  PTT:42.8 sec        Imaging:    Procedure:  Status post diagnostic and therapeutic paracentesis with drainage of 5500 cc of turbid yellow fluid, access site was right lower quadrant    Indication: Cirrhosis with moderate ascites on recent imaging, abdominal distention    Operators: Jaylen Paul    Anesthesia (type): 2% subcutaneous lidocaine    Contrast: None    EBL: Minimal    Findings/Follow up Plan of Care: Sample of fluid sent for culture and cytology, f/u results    Specimens Removed: 5500 cc of turbid yellow fluid    Implants: None    Complications: None    Condition/Disposition: In-patient unit    Please call Interventional Radiology x [phone] with any questions, concerns, or issues.

## 2020-07-22 NOTE — DISCHARGE NOTE PROVIDER - NSDCCPCAREPLAN_GEN_ALL_CORE_FT
PRINCIPAL DISCHARGE DIAGNOSIS  Diagnosis: Alcoholic hepatitis with ascites  Assessment and Plan of Treatment: Elevated Maddrey discriminant function 105, No signs of SBP or GI bleed. Not in alcohol withdrawal. Diagnostic paracentesis negative for SBP.  -advised to stop drinking alcohol, SW eval  -continue follow up with Dr Drew, next appointment scheduled for 8/05/20        SECONDARY DISCHARGE DIAGNOSES  Diagnosis: Acute liver failure  Assessment and Plan of Treatment:   -continue with rifaximin and lactulose for hepatic encephalopathy   -s/p repeat paracentesis, fluid negative for SBP   -continue with ciprofloxacin  -continue bowel regimen with Senna/colace/miralax as needed  -continue with lasix and spironolactone daily  -continue with naldolol  -Pain control for variceal re-bleeding  -seen by transplant hepatology-- abdominal US with dopplers negative  -continue follow up with Dr Drew, next appointment scheduled for 8/05/20    Diagnosis: Hepatocellular carcinoma  Assessment and Plan of Treatment:   Not presently a transplant candidate, possible future localized therapies are an option pending overall clinical progress and abstinence.  Follow up with Dr Drew and Dr Friedman as outpatient.    Diagnosis: ETOH abuse  Assessment and Plan of Treatment: Treatment for alcohol dependence and withdrawal includes medicines, detoxification, and therapy. Diagnosing and treating alcohol dependence and withdrawal as soon as possible may relieve or prevent symptoms. With treatment and care, your alcohol dependence and withdrawal may be controlled, and your quality of life improved.  Keep all follow up appointments. Write down any questions you may have. This way you will remember to ask these questions during your next visit.      Diagnosis: Anemia  Assessment and Plan of Treatment: No reported bleeding events. Hb 8.4.  Follow up with Dr Friedman (GI)  for surveillance EGD.    Diagnosis: Esophageal varices with bleeding  Assessment and Plan of Treatment:   S/p bleeding esophageal varices banding in 2017.  Follow up with Dr Friedman (GI)  for surveillance EGD. PRINCIPAL DISCHARGE DIAGNOSIS  Diagnosis: Alcoholic hepatitis with ascites  Assessment and Plan of Treatment: Elevated Maddrey discriminant function 105, No signs of SBP or GI bleed. Not in alcohol withdrawal. Diagnostic paracentesis negative for SBP.  -advised to stop drinking alcohol, SW eval  -You have an appointment with Interventional Radiology ([address] [facility]) on 7/28 at 8AM for LVP.   -Follow up with Dr Drew, next appointment scheduled for 8/05/20.      SECONDARY DISCHARGE DIAGNOSES  Diagnosis: Acute liver failure  Assessment and Plan of Treatment: -continue with rifaximin and lactulose for hepatic encephalopathy   -s/p repeat paracentesis, fluid negative for SBP   -continue with ciprofloxacin  -continue bowel regimen with Senna/colace/miralax as needed  -continue with lasix and spironolactone daily  -continue with naldolol  -Pain control for variceal re-bleeding  -seen by transplant hepatology-- abdominal US with dopplers negative  -You have an appointment with Interventional Radiology ([address] [facility]) on 7/28 at 8AM for LVP.   -Follow up with Dr Drew, next appointment scheduled for 8/05/20.    Diagnosis: Hepatocellular carcinoma  Assessment and Plan of Treatment:   Not presently a transplant candidate, possible future localized therapies are an option pending overall clinical progress and abstinence.  Follow up with Dr Drew and Dr Friedman as outpatient.    Diagnosis: ETOH abuse  Assessment and Plan of Treatment: Treatment for alcohol dependence and withdrawal includes medicines, detoxification, and therapy. Diagnosing and treating alcohol dependence and withdrawal as soon as possible may relieve or prevent symptoms. With treatment and care, your alcohol dependence and withdrawal may be controlled, and your quality of life improved.  Keep all follow up appointments. Write down any questions you may have. This way you will remember to ask these questions during your next visit.      Diagnosis: Anemia  Assessment and Plan of Treatment: No reported bleeding events. Hb 8.4.  Follow up with Dr Friedman (GI)  for surveillance EGD.    Diagnosis: Esophageal varices with bleeding  Assessment and Plan of Treatment:   S/p bleeding esophageal varices banding in 2017.  Follow up with Dr Friedman (GI)  for surveillance EGD. PRINCIPAL DISCHARGE DIAGNOSIS  Diagnosis: Alcoholic hepatitis with ascites  Assessment and Plan of Treatment: Elevated Maddrey discriminant function 105, No signs of SBP or GI bleed. Not in alcohol withdrawal. Diagnostic paracentesis negative for SBP.  -advised to stop drinking alcohol, SW eval  -You have an appointment with Interventional Radiology ([facility]) on 7/28 at 8AM for LVP. You will need to be tested for covid-19 prior to this procedure on Saturday, 7/25 - you will be contacted shortly regarding details of the test.   -Follow up with Dr Drew, next appointment scheduled for 8/05/20.      SECONDARY DISCHARGE DIAGNOSES  Diagnosis: Acute liver failure  Assessment and Plan of Treatment: -continue with rifaximin and lactulose for hepatic encephalopathy   -s/p repeat paracentesis, fluid negative for SBP   -continue with ciprofloxacin  -continue bowel regimen with Senna/colace/miralax as needed  -continue with lasix and spironolactone daily  -continue with naldolol  -Pain control for variceal re-bleeding  -seen by transplant hepatology-- abdominal US with dopplers negative  -You have an appointment with Interventional Radiology ([facility]) on 7/28 at 8AM for LVP. You will need to be tested for covid-19 prior to this procedure on Saturday, 7/25 - you will be contacted shortly regarding details of the test.   -Follow up with Dr Drew, next appointment scheduled for 8/05/20.    Diagnosis: Hepatocellular carcinoma  Assessment and Plan of Treatment:   Not presently a transplant candidate, possible future localized therapies are an option pending overall clinical progress and abstinence.  Follow up with Dr Drew and Dr Friedman as outpatient.    Diagnosis: ETOH abuse  Assessment and Plan of Treatment: Treatment for alcohol dependence and withdrawal includes medicines, detoxification, and therapy. Diagnosing and treating alcohol dependence and withdrawal as soon as possible may relieve or prevent symptoms. With treatment and care, your alcohol dependence and withdrawal may be controlled, and your quality of life improved.  Keep all follow up appointments. Write down any questions you may have. This way you will remember to ask these questions during your next visit.      Diagnosis: Anemia  Assessment and Plan of Treatment: No reported bleeding events. Hb 8.4.  Follow up with Dr Friedman (GI)  for surveillance EGD.    Diagnosis: Esophageal varices with bleeding  Assessment and Plan of Treatment:   S/p bleeding esophageal varices banding in 2017.  Follow up with Dr Friedman (GI)  for surveillance EGD.

## 2020-07-22 NOTE — PROGRESS NOTE ADULT - PROBLEM SELECTOR PLAN 1
Elevated Maddrey discriminant function 105, No s/s of SBP or GI bleed. Not in alcohol withdrawal. Diagnostic paracentesis neg  - dc prednisolone due to poor response  - daily coags, CMP, continue monitoring  - advised to stop drinking alcohol, PENNY su

## 2020-07-22 NOTE — PROGRESS NOTE ADULT - ASSESSMENT
51 y/o M with a PMHx of alcohol dependence, liver cirrhosis, esophageal varices s/p banding in 2017, ascites s/p paracentesis 1 year ago, IBS, transferred to [facility] for acute on chronic liver failure, deemed not a liver transplant candidate at this time due to poor abstinence history

## 2020-07-22 NOTE — DISCHARGE NOTE PROVIDER - HOSPITAL COURSE
51 y/o M with a PMHx of alcohol dependence, liver cirrhosis, esophageal varices s/p banding in 2017, ascites s/p paracentesis 1 year ago, IBS, transferred to [facility] for acute on chronic liver failure, deemed not a liver transplant candidate at this time due to poor abstinence history.         Problem/Plan - 1:    ·  Problem: Alcoholic hepatitis with ascites.  Plan: Elevated Maddrey discriminant function 105, No s/s of SBP or GI bleed. Not in alcohol withdrawal. Diagnostic paracentesis neg    -dc prednisolone due to poor response    -daily coags, CMP, continue monitoring    -advised to stop drinking alcohol, SW eval         Problem/Plan - 2:    ·  Problem: Acute liver failure without hepatic coma.  Plan: Decompensated hepatic cirrhosis w/liver failure. MELD score today is 32.     -c/w rifaximin and lactulose for hepatic encephalopathy     -c/w vitamin k x 3 days per Hepatology    -s/p paracentesis at [facility]. fluid studies not suggestive of SBP. BCx and UCx 7/12 NGTD. s/p aztreonam from 7/13-7/14. f/u repeat dx/tx paracentesis here    -c/w cipro for SBP ppx    -c/w lasix and spironolactone daily    -c/w propanolol as secondary ppx for variceal re-bleeding    -seen by transplant hepatology-- abdominal US with dopplers negative    -pt s/p LVP, fluid negative for SBP     -currently not a transplant candidate (active etoh use)         Problem/Plan - 3:    ·  Problem: Hepatocellular carcinoma.  Plan: Noted on MRI to have LIRADS 5 lesion 1.3cm. Dx discussed with patient at bedside    - based on today's labs, patient is child castro class C cirrhosis, making him stage D    - not presently a transplant candidate, possible future localized therapies are an option pending overall clinical progress and abstinence         Problem/Plan - 4:    ·  Problem: ETOH abuse.  Plan: Chronic ETOH abuse since for >20 years with recent admission to [facility] ICU for withdrawal. Drinks 1 quart vodka daily, last drink was 7/9    -symptom triggered CIWA with Ativan, thiamin, folate- CIWA 1         Problem/Plan - 5:    ·  Problem: Anemia.  Plan: No reported bleeding events. Hb 8.4    -stool for occult stool ordered    - GI plan noted         Problem/Plan - 6:    Problem: Esophageal varices with bleeding. Plan: S/p bleeding esophageal varices banding in 2017    -c/w propranolol with hold parameters         Problem/Plan - 7:    ·  Problem: Need for prophylactic measure.  Plan: DVT: HSQ    Diet: low K and low protein        dispo: likely home today. Discussed with hepatology Dr. Drew, will set patient up for repeat LVP next week. Hepatology team to discuss with Dr. Friedman surveillance EGD next week as well.

## 2020-07-22 NOTE — PROGRESS NOTE ADULT - PROVIDER SPECIALTY LIST ADULT
Gastroenterology
Hospitalist
Hospitalist
Internal Medicine
Intervent Radiology
Intervent Radiology
Transplant Hepatology
Gastroenterology
Transplant Hepatology

## 2020-07-22 NOTE — DISCHARGE NOTE PROVIDER - NSDCFUSCHEDAPPT_GEN_ALL_CORE_FT
LASHANDA AGUILAR ; 08/05/2020 ; [facility] [address] [place] [address] LASHANDA AGUILAR ; 08/05/2020 ; [facility] [address] [place] [address] LASHANDA AGUILAR ; 08/05/2020 ; [facility] [address] [place] [address] LASHANDA AGUILAR ; 07/25/2020 ; [facility] [address] [facility]  LASHANDA AGUILAR ; 07/28/2020 ; [facility]  LASHANDA AGUILAR ; 08/05/2020 ; [facility] [address] [place] [address]

## 2020-07-22 NOTE — PROGRESS NOTE ADULT - SUBJECTIVE AND OBJECTIVE BOX
Meir Grigsby MD  Division of Hospital Medicine  Pager: [phone]  If no response or off-hours, page [phone]  -------------------------------------    Patient is a 50y old  Male who presents with a chief complaint of liver transplant evaluation (21 Jul 2020 17:32)      SUBJECTIVE / OVERNIGHT EVENTS: none acute  ADDITIONAL REVIEW OF SYSTEMS: s/p LVP with 5 L removed, negative for SBP. No other acut eissues. pt reports feeling better, abd discomfort relieved, no fevers/chills, no sob/cough, no nvd, tolerating PO, eager to go home.     MEDICATIONS  (STANDING):  ciprofloxacin     Tablet 500 milliGRAM(s) Oral daily  famotidine    Tablet 20 milliGRAM(s) Oral daily  folic acid 1 milliGRAM(s) Oral daily  furosemide    Tablet 20 milliGRAM(s) Oral daily  lactulose Syrup 20 Gram(s) Oral every 4 hours  magnesium oxide 400 milliGRAM(s) Oral daily  multivitamin 1 Tablet(s) Oral daily  propranolol 10 milliGRAM(s) Oral every 12 hours  rifAXIMin 550 milliGRAM(s) Oral two times a day  spironolactone 50 milliGRAM(s) Oral daily  thiamine 100 milliGRAM(s) Oral daily    MEDICATIONS  (PRN):  LORazepam   Injectable 1 milliGRAM(s) IV Push every 2 hours PRN CIWA-Ar score increase by 2 points and a total score of 7 or less  LORazepam   Injectable 1 milliGRAM(s) IV Push every 1 hour PRN CIWA-Ar score 8 or greater  ondansetron Injectable 8 milliGRAM(s) IV Push two times a day PRN Nausea and/or Vomiting      CAPILLARY BLOOD GLUCOSE        I&O's Summary    21 Jul 2020 07:01  -  22 Jul 2020 07:00  --------------------------------------------------------  IN: 540 mL / OUT: 0 mL / NET: 540 mL    22 Jul 2020 07:01  -  22 Jul 2020 12:14  --------------------------------------------------------  IN: 240 mL / OUT: 0 mL / NET: 240 mL        PHYSICAL EXAM:  Vital Signs Last 24 Hrs  T(C): 36.4 (22 Jul 2020 11:31), Max: 36.7 (21 Jul 2020 19:37)  T(F): 97.5 (22 Jul 2020 11:31), Max: 98 (21 Jul 2020 19:37)  HR: 62 (22 Jul 2020 11:31) (53 - 73)  BP: 109/69 (22 Jul 2020 11:31) (98/63 - 119/77)  BP(mean): --  RR: 18 (22 Jul 2020 11:31) (17 - 18)  SpO2: 96% (22 Jul 2020 11:31) (95% - 99%)  CONSTITUTIONAL: NAD, well-developed, well-groomed  EYES: PERRLA; conjunctiva and sclera clear, icteric  ENMT: Moist oral mucosa, no pharyngeal injection or exudates; normal dentition  NECK: Supple, no palpable masses; no thyromegaly  RESPIRATORY: Normal respiratory effort; lungs are clear to auscultation bilaterally  CARDIOVASCULAR: Regular rate and rhythm, normal S1 and S2, no murmur/rub/gallop; No lower extremity edema; Peripheral pulses are 2+ bilaterally  ABDOMEN: Nontender to palpation, normoactive bowel sounds, +softly distended  MUSCLOSKELETAL:  Normal gait; no clubbing or cyanosis of digits; no joint swelling or tenderness to palpation  PSYCH: A+O to person, place, and time; affect appropriate  NEUROLOGY: CN 2-12 are intact and symmetric; no gross sensory deficits;   SKIN: No rashes; no palpable lesions, jaundiced    LABS:                        8.9    12.64 )-----------( 91       ( 22 Jul 2020 07:12 )             28.2     07-22    136  |  101  |  24<H>  ----------------------------<  91  4.0   |  24  |  0.71    Ca    8.8      22 Jul 2020 07:12    TPro  6.0  /  Alb  2.4<L>  /  TBili  19.5<H>  /  DBili  x   /  AST  110<H>  /  ALT  64<H>  /  AlkPhos  99  07-22    PT/INR - ( 22 Jul 2020 08:20 )   PT: 31.4 sec;   INR: 2.77 ratio         PTT - ( 22 Jul 2020 08:20 )  PTT:42.8 sec          Culture - Fungal, Body Fluid (collected 21 Jul 2020 22:17)  Source: .Body Fluid Peritoneal Fluid  Preliminary Report (22 Jul 2020 08:17):    Testing in progress    Culture - Body Fluid with Gram Stain (collected 21 Jul 2020 22:17)  Source: .Body Fluid Peritoneal Fluid  Gram Stain (22 Jul 2020 06:23):    polymorphonuclear leukocytes seen per low power field    No organisms seen per oil power field    by cytocentrifuge        RADIOLOGY & ADDITIONAL TESTS:  Results Reviewed:   Imaging Personally Reviewed:  Electrocardiogram Personally Reviewed:    COORDINATION OF CARE:  Care Discussed with Consultants/Other Providers [Y/N]: Dr. Drew  Prior or Outpatient Records Reviewed [Y/N]:

## 2020-07-22 NOTE — PROGRESS NOTE ADULT - SUBJECTIVE AND OBJECTIVE BOX
INTERVAL HPI/OVERNIGHT EVENTS:  pt seen and examined  resting in bed  no new complaints          MEDICATIONS  (STANDING):  enoxaparin Injectable 40 milliGRAM(s) SubCutaneous daily  ergocalciferol 91806 Unit(s) Oral every week  famotidine    Tablet 20 milliGRAM(s) Oral two times a day  folic acid 1 milliGRAM(s) Oral daily  furosemide    Tablet 20 milliGRAM(s) Oral daily  lactulose Syrup 20 Gram(s) Oral four times a day  magnesium oxide 400 milliGRAM(s) Oral daily  multivitamin 1 Tablet(s) Oral daily  prednisoLONE    3 mG/mL Solution (ORAPRED) 40 milliGRAM(s) Oral daily  propranolol 10 milliGRAM(s) Oral every 12 hours  rifAXIMin 550 milliGRAM(s) Oral two times a day  thiamine 100 milliGRAM(s) Oral daily    MEDICATIONS  (PRN):  LORazepam   Injectable 1 milliGRAM(s) IV Push every 2 hours PRN Symptom-triggered: 2 point increase in CIWA -Ar score and a total score of 7 or LESS  ondansetron Injectable 4 milliGRAM(s) IV Push every 6 hours PRN Nausea      Allergies    penicillin (Unknown)    Intolerances        Review of Systems:    see above unable to obtain in entirety     Vital Signs Last 24 Hrs  T(C): 36.8 (17 Jul 2020 07:32), Max: 36.8 (16 Jul 2020 12:31)  T(F): 98.3 (17 Jul 2020 07:32), Max: 98.3 (17 Jul 2020 07:32)  HR: 68 (17 Jul 2020 07:32) (56 - 68)  BP: 114/74 (17 Jul 2020 07:32) (103/61 - 114/74)  BP(mean): --  RR: 17 (17 Jul 2020 07:32) (16 - 19)  SpO2: 99% (17 Jul 2020 07:32) (92% - 99%)    PHYSICAL EXAM:      Constitutional: lying in bed  HEENT: ncat  Gastrointestinal: soft nt +dt  Extremities: mild edema  Vascular: + peripheral pulses  Neurological: lethargic but arousable, appropriate, periods of confusion  Skin: jaundiced, scattered ecchymosis    LABS:                        7.9    9.62  )-----------( 82       ( 16 Jul 2020 07:01 )             24.8     07-16    140  |  104  |  27<H>  ----------------------------<  82  4.0   |  32<H>  |  1.10    Ca    8.3<L>      16 Jul 2020 07:01  Phos  3.7     07-16  Mg     2.3     07-16    TPro  5.9<L>  /  Alb  1.6<L>  /  TBili  21.4<HH>  /  DBili  x   /  AST  85<H>  /  ALT  42  /  AlkPhos  103  07-16    PT/INR - ( 16 Jul 2020 07:01 )   PT: 36.3 sec;   INR: 3.28 ratio               RADIOLOGY & ADDITIONAL TESTS:

## 2020-07-22 NOTE — PROGRESS NOTE ADULT - ASSESSMENT
Impression: 49 year old man with alcohol abuse/dependence with multiple relapse, history of alcoholic hepatitis, decompensated alcoholic cirrhosis c/b variceal bleed, ascites, peripheral edema who presents from OSH with acute alcoholic hepatitis and decompensated cirrhosis in setting of active alcohol use for transplant evaluation.     #Alcoholic hepatitis with hyperbilirubinemia: Stable now. Was on prednisolone with poor response. Secondary to recent alcohol relapse - admits to drinking quart of vodka daily, last drink on 7/9/20.   Total bilirubin markedly elevated but slight improvement again. INR stable. Maddrey's DF score was elevated prior  #Alcohol-related cirrhosis: Decompensated by prior variceal bleed, ascites, peripheral edema and now, PSE. No prior history of SBP. MELD-Na 30 7/18/20  -Ascites: small on exam s/p paracentesis on 7/21 with 5.5L removed, on lasix and aldactone  -Varices: Most recent EGD 3/2019 with Grade II EV, portal hypertensive gastropathy, duodenopathy. On beta blocker  -HCC: small 1.3 cm lesion LIRADS 5 c/f HCC  -PSE: Newly diagnosed PSE at OSH - encephalopathy now improving on lactulose and rifaximin. On rifaximin alone outpatient.   #HCC: LIRADS 5 lesion on MRI. At this time, patient not likely candidate for liver directed therapy given acute issues with AMS and alc hepatitis.   #Anemia, macrocytic: Suspect multifactorial due to anemia of chronic disease vs. bone marrow suppression from alcohol abuse vs. chronic GI blood loss in setting of known EV; No overt bleeding now. Macrocytosis likely secondary to alcohol use. Hb stable since admission to OSH without overt GI bleeding.   #Transplant evaluation: Not a candidate at this point in time due to recent alcohol relapse/active drinking despite awareness of alcoholic related liver disease    Recommendations:  - continue with lasix 20 and aldactone 50mg  - continue with propanolol 10 BID as secondary ppx  - lactulose 20 g every 4 hours; titrate to ensure 3-4 BMs per day   - Xifaxan BID for HE  - continue ciprofloxacin 500 mg daily SBP ppx  - daily MVI, folate, thiamine daily  - Social Work consult tomorrow for alcohol rehab resources/cessation plan  - no objection to discharge with close outpatient follow up from hepatology standpoint  - Hepatology to follow

## 2020-07-22 NOTE — PROGRESS NOTE ADULT - PROBLEM SELECTOR PLAN 2
Decompensated hepatic cirrhosis w/liver failure. MELD score today is 32.   -c/w rifaximin and lactulose for hepatic encephalopathy   - c/w vitamin k x 3 days per Hepatology  - s/p paracentesis at [facility]. fluid studies not suggestive of SBP. BCx and UCx 7/12 NGTD. s/p aztreonam from 7/13-7/14. f/u repeat dx/tx paracentesis here  -c/w cipro for SBP ppx  -cw lasix and spironolactone daily  -c/w propanolol as secondary ppx for variceal re-bleeding  -seen by transplant hepatology-- abdominal US with dopplers negative  - pt s/p LVP, fluid negative for SBP   -currently not a transplant candidate (active etoh use)

## 2020-07-22 NOTE — PROGRESS NOTE ADULT - PROBLEM SELECTOR PLAN 7
DVT: HSQ  Diet: low K and low protein    dispo: likely home today. Discussed with hepatology Dr. Drew, will set patient up for repeat LVP next week. Hepatology team to discuss with Dr. Friedman surveillence EGD next week as well.     Total discharge time: 45 minutes.
DVT: HSQ  Diet: low K and low protein

## 2020-07-22 NOTE — PROGRESS NOTE ADULT - ASSESSMENT
Assessment and Recommendation:    Problem/Recommendation - 1:  Problem: Alcoholic hepatitis with ascites  sp dx tap; saag cw phtn, cx ngtd and cyto neg  cont prednisolone for high df total 28 day course  cont rifaximin and lactulose- titrate for 3 soft bms/day  cont pepcid, propanolol w parameters, diuretics per primary  avoid hepatotoxins; monitor renal fxn; daily meld labs   low na/pro diet as tolerated  monitor gi function  to follow  outpatient follow up with  hepatology       Problem/Recommendation - 2:  ·  Problem: Liver failure, acute.  Recommendation: as above  decompensated cirrhosis       Problem/Recommendation - 3:  ·  Problem: Esophageal varices with bleeding.  Recommendation:   no s/s active gib  continue BB     Problem/Recommendation - 4:  ·  Problem: ETOH abuse.  Recommendation: CIWA protocol

## 2020-07-22 NOTE — DISCHARGE NOTE NURSING/CASE MANAGEMENT/SOCIAL WORK - NSDCFUADDAPPT_GEN_ALL_CORE_FT
You have an appointment with Interventional Radiology ([address] [place] [address]) on 7/28 at 8AM for LVP.   Follow up with Dr Drew on 07/28/20.  Follow up with Dr Friedman for surveillance EGD.

## 2020-07-22 NOTE — DISCHARGE NOTE PROVIDER - CARE PROVIDER_API CALL
Edy Drew)  Gastroenterology; Internal Medicine  [address] [place] [address]  Phone: [phone]  Fax: [phone]  Established Patient  Scheduled Appointment: 08/05/2020    Edi Friedman  GASTROENTEROLOGY  [address] [place] [address]  Phone: [phone]  Fax: [phone]  Established Patient  Follow Up Time: 1 week    [place] [address]  Phone: [phone]  Fax: [phone]  Established Patient  Follow Up Time: Routine

## 2020-07-22 NOTE — DISCHARGE NOTE PROVIDER - NSDCFUADDAPPT_GEN_ALL_CORE_FT
Follow up with Dr Drew on 08/05/20 for repeat LVP.  Follow up with Dr Friedman for surveillance EGD. Follow up with Dr Drew on 08/05/20.  Follow up with Dr Friedman for surveillance EGD. You have an appointment with Interventional Radiology ([address] [place] [address]) on 7/28 at 8AM for LVP.   Follow up with Dr Drew on 07/28/20.  Follow up with Dr Friedman for surveillance EGD. -You have an appointment with Interventional Radiology ([address] [place] [address]) on 7/28 at 8AM for LVP. You will need to be tested for covid-19 prior to this procedure on Saturday, 7/25 - you will be contacted shortly regarding details of the test.   -Follow up with Dr Drew on 07/28/20.  -Follow up with Dr Friedman for surveillance EGD.

## 2020-07-22 NOTE — PROGRESS NOTE ADULT - ATTENDING COMMENTS
Patient seen and examined with Liver team. I agree with the plan as above
50 yo M with prior history of alcoholic hepatitis and decompensated ALD cirrhosis complicated by ascites, prior variceal hemorrhage, and hepatic encephalopathy, with acute on chronic liver failure (ACLF) secondary to recurrent alcoholic hepatitis after recent alcohol relapse. He is a steroid non-responder and prognosis is guarded, as his current MELD-Na is 29 and he is not currently a liver transplant candidate. This has been discussed with him and his family; they understand the gravity of his situation but remain optimistic that either his liver function may improve partially with longer duration of alcohol abstinence or that, even with poor liver function, he may live long enough to complete an alcohol relapse prevention program and eventually be considered for liver transplantation. We will arrange for close outpatient follow-up, including for repeat LVP and EGD for variceal surveillance.    Please don't hesitate to call with any questions/concerns.    Edy Drew M.D., Ph.D.  Transplant Hepatology  Cell: [phone]
discussed with NP Deborah Gong D.O.  Hospitalist Pager # [phone]
discussed with NP Jeanie Gong D.O.  Hospitalist Pager # [phone]

## 2020-07-22 NOTE — PROGRESS NOTE ADULT - REASON FOR ADMISSION
liver transplant evaluation

## 2020-07-22 NOTE — PROGRESS NOTE ADULT - PROBLEM SELECTOR PLAN 3
Noted on MRI to have LIRADS 5 lesion 1.3cm. Dx discussed with patient at bedside.   - based on today's labs, patient is child castro class C cirrhosis, making him stage D.   - not presently a transplant candidate, possible future localized therapies are an option pending overall clinical progress and abstinence

## 2020-07-22 NOTE — DISCHARGE NOTE PROVIDER - PROVIDER TOKENS
PROVIDER:[TOKEN:[[id number]],SCHEDULEDAPPT:[08/05/2020],ESTABLISHEDPATIENT:[T]],PROVIDER:[TOKEN:[[id number]],FOLLOWUP:[1 week],ESTABLISHEDPATIENT:[T]],PROVIDER:[TOKEN:[[id number]],FOLLOWUP:[Routine],ESTABLISHEDPATIENT:[T]]

## 2020-07-24 DIAGNOSIS — Z01.818 ENCOUNTER FOR OTHER PREPROCEDURAL EXAMINATION: ICD-10-CM

## 2020-07-25 ENCOUNTER — APPOINTMENT (OUTPATIENT)
Dept: DISASTER EMERGENCY | Facility: CLINIC | Age: 50
End: 2020-07-25

## 2020-07-26 LAB
CULTURE RESULTS: SIGNIFICANT CHANGE UP
SARS-COV-2 N GENE NPH QL NAA+PROBE: NOT DETECTED
SPECIMEN SOURCE: SIGNIFICANT CHANGE UP

## 2020-07-28 ENCOUNTER — RESULT REVIEW (OUTPATIENT)
Age: 50
End: 2020-07-28

## 2020-07-28 ENCOUNTER — OUTPATIENT (OUTPATIENT)
Dept: OUTPATIENT SERVICES | Facility: HOSPITAL | Age: 50
LOS: 1 days | End: 2020-07-28
Payer: COMMERCIAL

## 2020-07-28 DIAGNOSIS — K70.31 ALCOHOLIC CIRRHOSIS OF LIVER WITH ASCITES: ICD-10-CM

## 2020-07-28 DIAGNOSIS — Z98.890 OTHER SPECIFIED POSTPROCEDURAL STATES: Chronic | ICD-10-CM

## 2020-07-28 PROCEDURE — C1729: CPT

## 2020-07-28 PROCEDURE — P9047: CPT

## 2020-07-28 PROCEDURE — 49083 ABD PARACENTESIS W/IMAGING: CPT

## 2020-07-29 ENCOUNTER — APPOINTMENT (OUTPATIENT)
Dept: HEPATOLOGY | Facility: CLINIC | Age: 50
End: 2020-07-29
Payer: MEDICAID

## 2020-07-29 ENCOUNTER — LABORATORY RESULT (OUTPATIENT)
Age: 50
End: 2020-07-29

## 2020-07-29 VITALS
TEMPERATURE: 98 F | HEART RATE: 54 BPM | WEIGHT: 197 LBS | RESPIRATION RATE: 16 BRPM | DIASTOLIC BLOOD PRESSURE: 52 MMHG | BODY MASS INDEX: 31.66 KG/M2 | HEIGHT: 66 IN | SYSTOLIC BLOOD PRESSURE: 87 MMHG

## 2020-07-29 DIAGNOSIS — F10.20 ALCOHOL DEPENDENCE, UNCOMPLICATED: ICD-10-CM

## 2020-07-29 PROCEDURE — 99215 OFFICE O/P EST HI 40 MIN: CPT

## 2020-07-29 RX ORDER — NADOLOL 20 MG/1
20 TABLET ORAL DAILY
Qty: 90 | Refills: 2 | Status: DISCONTINUED | COMMUNITY
Start: 2018-10-10 | End: 2020-07-29

## 2020-07-29 RX ORDER — ZINC SULFATE TAB 220 MG (50 MG ZINC EQUIVALENT) 220 (50 ZN) MG
220 (50 ZN) TAB ORAL
Qty: 180 | Refills: 2 | Status: ACTIVE | COMMUNITY
Start: 2020-01-27 | End: 1900-01-01

## 2020-07-29 RX ORDER — THIAMINE MONONITRATE (VIT B1) 100 MG
100 TABLET ORAL DAILY
Qty: 90 | Refills: 2 | Status: COMPLETED | COMMUNITY
Start: 2017-10-18 | End: 2020-07-29

## 2020-07-29 RX ORDER — PROPRANOLOL HYDROCHLORIDE 10 MG/1
10 TABLET ORAL TWICE DAILY
Refills: 0 | Status: DISCONTINUED | COMMUNITY
End: 2020-07-29

## 2020-07-29 RX ORDER — CALCIUM CARBONATE/VITAMIN D3 500-10/5ML
400 LIQUID (ML) ORAL DAILY
Refills: 0 | Status: ACTIVE | COMMUNITY

## 2020-07-29 RX ORDER — FUROSEMIDE 20 MG/1
20 TABLET ORAL DAILY
Qty: 30 | Refills: 1 | Status: DISCONTINUED | COMMUNITY
Start: 2018-02-26 | End: 2020-07-29

## 2020-07-29 RX ORDER — RANITIDINE 300 MG/1
300 TABLET ORAL
Refills: 0 | Status: DISCONTINUED | COMMUNITY
Start: 2018-06-04 | End: 2020-07-29

## 2020-07-29 RX ORDER — MAGNESIUM OXIDE 241.3 MG/1000MG
400 TABLET ORAL TWICE DAILY
Qty: 180 | Refills: 2 | Status: DISCONTINUED | COMMUNITY
Start: 2020-01-23 | End: 2020-07-29

## 2020-07-29 RX ORDER — FUROSEMIDE 20 MG/1
20 TABLET ORAL DAILY
Refills: 0 | Status: ACTIVE | COMMUNITY

## 2020-07-29 NOTE — CONSULT LETTER
[Dear  ___] : Dear  [unfilled], [Courtesy Letter:] : I had the pleasure of seeing your patient, [unfilled], in my office today. [Consult Closing:] : Thank you very much for allowing me to participate in the care of this patient.  If you have any questions, please do not hesitate to contact me. [Please see my note below.] : Please see my note below. [FreeTextEntry3] : Sincerely,\par \par Edy Drew M.D., Ph.D.\par  of Medicine\par [facility]\par [address] [place] [address]\[place] [address]\par Tel: [phone]\par Fax: [phone]\par Cell: [phone]\par E-mail: [email or web address]\par  [FreeTextEntry2] : Dr. Breezy Maza

## 2020-07-29 NOTE — REVIEW OF SYSTEMS
[As Noted in HPI] : as noted in HPI [Easy Bruising] : a tendency for easy bruising [Negative] : Heme/Lymph

## 2020-07-29 NOTE — HISTORY OF PRESENT ILLNESS
[de-identified] : Mr. Mancini is a 49 yo  M with a history of alcoholic hepatitis (3/2019, biopsy-proven, s/p steroid therapy) and decompensated alcohol-related cirrhosis (first diagnosed in 1/2017) and complicated by ascites, peripheral edema, history of esophageal variceal hemorrhage (7/2017), portal hypertensive gastropathy/duodenopathy, and hyponatremia. He has no prior history of SBP, PVT, or HCC. He had another alcohol relapse in 8/2019 with subsequent deterioration of his liver function including worsened jaundice and ascites, and was started on baclofen for MAT in 1/2020.\par \par He was last seen in this office on 1/30/20 but had a phone visit with me on 3/19/20, when he reported sobriety from alcohol. However, he relapsed again during the COVID-19 pandemic and was recently hospitalized at [facility] from 7/10/20-7/17/20, then transferred to [facility] from 7/17/20-7/22/20 to be under my care, due to recurrent probable alcoholic hepatitis with acute on chronic liver failure (ACLF). His blood alcohol level was 30 when he was admitted on 7/10/20. An infectious work-up was negative (including urine culture, blood cultures x2, and diagnostic paracentesis all done on 7/12/20), and he subsequently received a trial of prednisolone but was a non-responder. His hospital course was complicated by hyponatremia that normalized with IV albumin and fluid restriction, hepatic encephalopathy that gradually resolved with rifaximin and lactulose, and refractory ascites that required LVP, with 5.5L removed on 7/21/20 with no evidence of SBP, high SAAG, and low ascitic fluid protein <0.6. While hospitalized, he also underwent liver imaging and was newly diagnosed with a small HCC.\par \par MRI abdomen with and without contrast (7/20/20): Cirrhosis with splenomegaly with splenic infarcts, large ascites, patent hepatic and portal veins, non-specific gallbladder wall thickening, and patent periumbilical vein. 1.3 cm S5/8 lesion with arterial enhancement and washout, visible on US, consistent with HCC (LR-5).\par \par He is being seen today for post-hospital discharge follow-up, accompanied by his sister, Ellie. He has been living with his mother and sisters since being discharged, and Ellie arranges his medications for him in a pill box. He reports feeling better recently, though still "a little slow" when trying to move around, especially as he has still had abdominal distension (now s/p repeat 4.8L LVP as outpatient yesterday) and some lower extremity swelling bilaterally. He denies any recent episodes of confusion, and has been having 4 BMs/day with lactulose, which he has been taking 4 times daily as prescribed. He has had some low BPs recently but denies dizziness. No overt GI bleeding. No fevers/chills. He denies any recent alcohol cravings or slips, and has reached out to [facility] to enroll in their virtual alcohol rehabilitation program, and is hoping to hear back from them later today or tomorrow re: whether he was accepted there and when his program will start.

## 2020-07-29 NOTE — ASSESSMENT
[FreeTextEntry1] : 49 yo M with severe AUD, history of biopsy-proven alcoholic hepatitis (3/2019), recurrent probable alcoholic hepatitis (7/2020), and decompensated alcohol-related cirrhosis complicated by ascites, peripheral edema, and prior esophageal variceal hemorrhage (7/2017), and hepatic encephalopathy, with recent acute on chronic liver failure (ACLF) due to alcohol relapse, also with newly-diagnosed HCC.\par \par # Recurrent probable alcoholic hepatitis: Steroid non-responder while inpatient. Will continue with supportive care as below. I discussed with him and his family that his liver function may improve partially with a longer duration of alcohol abstinence, but that there is a high risk of short-term mortality and that he unfortunately is not currently a liver transplant candidate due to his recent alcohol relapses despite known advanced liver disease.\par \par # Ascites and peripheral edema:\par - Incompletely controlled, but unable to increase diuretic dosing today given low BPs.\par - Re-check BMP today.\par - Pending labs, will continue furosemide 40 mg po daily and spironolactone 150 mg po daily.\par - Continue ciprofloxacin 500 mg po daily for primary SBP prophylaxis.\par - Continue periodic LVPs as needed with 1:1 albumin replacement.\par - Mr. AGUILAR was counseled to adhere to a low sodium (<2 grams of sodium per day) diet by: avoiding adding any salt to meals (removing the salt shaker from the table); eliminating salty foods from his diet; eating more home-cooked meals; choosing fresh or frozen, not canned, vegetables and fruits; and reading ingredient and food labels to choose low sodium foods.\par \par # History of secondary esophageal varices with bleeding:\par - No recent overt bleeding.\par - Last EGD on 3/18/19 with small EV, PHG, and duodenopathy.\par - Repeat EGD for surveillance has been re-scheduled for 8/10/20.\par - Given intermittent hypotension with SBPs <90 mmHg, will discontinue propranolol (risks outweigh benefits).\par - Defer colonoscopy (for age-appropriate colon cancer screening) for now given end-stage liver disease, as risks currently outweigh benefits.\par \par # Hepatic encephalopathy:\par - Had severe (grade 3) HE while hospitalized with ACLF, but currently appears to be well-controlled since discharge.\par - Continue rifaximin 550 mg po bid.\par - Continue lactulose, and discussed how to titrate to maintain 3-4 BMs/day.\par \par # HCC:\par - MRI on 7/20/20 with 1.3 cm HCC (LR-5) in S5/8, visible on US and potentially amenable to ablation or embolization.\par - He is not a surgical resection candidate due to his advanced cirrhosis and portal hypertension.\par - He is not currently a liver transplant candidate due to his multiple alcohol relapses despite known advanced liver disease.\par - Will plan to refer to IR for liver-directed therapy once his liver function improves with longer duration of alcohol abstinence.\par \par # Decompensated ALD cirrhosis:\par - Child-Lemus class C, with MELD-Na 30 based on his labs from 7/22/20. Will re-check labs today.\par - He is not currently a liver transplant candidate given multiple alcohol relapses including recently, despite known advanced liver disease. He will need to complete an alcohol rehab program and achieve a longer duration of sobriety, at a minimum, in order for his candidacy to be re-considered.\par \par # Severe AUD, with recent alcohol relapse:\par - Last reported drink on 7/9/20 (blood alcohol 30 on 7/10/20).\par - Continue biomarker surveillance.\par - He was encouraged to continue to pursue enrollment in an outpatient alcohol rehabilitation program, and already reached out to a program since his recent hospital discharge. I advised him to reach out to me if they are unable to accept him, for additional resources.\par - Continue MAT with baclofen 10 mg po tid.\par \par # Health maintenance:\par - Mr. AGUILAR was counseled to: abstain from alcohol and all illicit drugs; avoid use of herbal and dietary supplements due to potential hepatotoxicity; limit use of acetaminophen to <2 grams per day; avoid use of nonsteroidal antiinflammatory drugs (NSAIDs) as these can precipitate renal dysfunction in patients with advanced liver disease; avoid eating any unpasteurized dairy products; and avoid eating raw/steamed oysters or other shellfish to avoid risk of Vibrio infection.\par - Immune to HAV and HBV.\par - Continue ergocalciferol for vitamin D deficiency.\par - Continue zinc sulfate for deficiency and magnesium oxide for deficiency.\par - Continue folic acid, thiamine, and MVI supplementation.\par \par He will return for follow-up in 4-6 weeks.

## 2020-07-30 LAB
25(OH)D3 SERPL-MCNC: 21.2 NG/ML
AFP-TM SERPL-MCNC: 4 NG/ML
ALBUMIN SERPL ELPH-MCNC: 2.9 G/DL
ALP BLD-CCNC: 127 U/L
ALT SERPL-CCNC: 62 U/L
ANION GAP SERPL CALC-SCNC: 15 MMOL/L
APPEARANCE: ABNORMAL
AST SERPL-CCNC: 96 U/L
BACTERIA UR CULT: NORMAL
BACTERIA: NEGATIVE
BASOPHILS # BLD AUTO: 0.11 K/UL
BASOPHILS NFR BLD AUTO: 1.1 %
BILIRUB DIRECT SERPL-MCNC: 9.6 MG/DL
BILIRUB SERPL-MCNC: 18.7 MG/DL
BILIRUBIN URINE: ABNORMAL
BLOOD URINE: NEGATIVE
BUN SERPL-MCNC: 18 MG/DL
CALCIUM SERPL-MCNC: 8.3 MG/DL
CHLORIDE SERPL-SCNC: 99 MMOL/L
CO2 SERPL-SCNC: 21 MMOL/L
COLOR: ABNORMAL
CREAT SERPL-MCNC: 0.96 MG/DL
EOSINOPHIL # BLD AUTO: 0.28 K/UL
EOSINOPHIL NFR BLD AUTO: 2.7 %
GLUCOSE QUALITATIVE U: NEGATIVE
GLUCOSE SERPL-MCNC: 117 MG/DL
HCT VFR BLD CALC: 32.3 %
HGB BLD-MCNC: 10.1 G/DL
HYALINE CASTS: 1 /LPF
IMM GRANULOCYTES NFR BLD AUTO: 3.7 %
INR PPP: 2.51 RATIO
KETONES URINE: NEGATIVE
LEUKOCYTE ESTERASE URINE: NEGATIVE
LYMPHOCYTES # BLD AUTO: 1.3 K/UL
LYMPHOCYTES NFR BLD AUTO: 12.6 %
MAGNESIUM SERPL-MCNC: 2 MG/DL
MAN DIFF?: NORMAL
MCHC RBC-ENTMCNC: 31.3 GM/DL
MCHC RBC-ENTMCNC: 39.3 PG
MCV RBC AUTO: 125.7 FL
MICROSCOPIC-UA: NORMAL
MONOCYTES # BLD AUTO: 1.51 K/UL
MONOCYTES NFR BLD AUTO: 14.6 %
NEUTROPHILS # BLD AUTO: 6.75 K/UL
NEUTROPHILS NFR BLD AUTO: 65.3 %
NITRITE URINE: NEGATIVE
PH URINE: 6.5
PLATELET # BLD AUTO: 86 K/UL
POTASSIUM SERPL-SCNC: 4.7 MMOL/L
PROT SERPL-MCNC: 6.6 G/DL
PROTEIN URINE: NEGATIVE
PT BLD: 28.3 SEC
RBC # BLD: 2.57 M/UL
RBC # FLD: 15.9 %
RED BLOOD CELLS URINE: 2 /HPF
SODIUM SERPL-SCNC: 135 MMOL/L
SPECIFIC GRAVITY URINE: 1.01
SQUAMOUS EPITHELIAL CELLS: 0 /HPF
UROBILINOGEN URINE: ABNORMAL
WBC # FLD AUTO: 10.33 K/UL
WHITE BLOOD CELLS URINE: 0 /HPF

## 2020-08-02 LAB
ETHYL GLUCURONIDE UR QL SCN: NEGATIVE
ZINC SERPL-MCNC: 54 UG/DL

## 2020-08-04 ENCOUNTER — OUTPATIENT (OUTPATIENT)
Dept: OUTPATIENT SERVICES | Facility: HOSPITAL | Age: 50
LOS: 1 days | End: 2020-08-04
Payer: COMMERCIAL

## 2020-08-04 DIAGNOSIS — Z98.890 OTHER SPECIFIED POSTPROCEDURAL STATES: Chronic | ICD-10-CM

## 2020-08-04 DIAGNOSIS — Z11.59 ENCOUNTER FOR SCREENING FOR OTHER VIRAL DISEASES: ICD-10-CM

## 2020-08-04 LAB — BACTERIA BLD CULT: NORMAL

## 2020-08-04 PROCEDURE — U0003: CPT

## 2020-08-05 DIAGNOSIS — R18.8 OTHER ASCITES: ICD-10-CM

## 2020-08-05 LAB — SARS-COV-2 RNA SPEC QL NAA+PROBE: SIGNIFICANT CHANGE UP

## 2020-08-07 ENCOUNTER — RESULT REVIEW (OUTPATIENT)
Age: 50
End: 2020-08-07

## 2020-08-07 ENCOUNTER — OUTPATIENT (OUTPATIENT)
Dept: OUTPATIENT SERVICES | Facility: HOSPITAL | Age: 50
LOS: 1 days | End: 2020-08-07
Payer: COMMERCIAL

## 2020-08-07 DIAGNOSIS — Z98.890 OTHER SPECIFIED POSTPROCEDURAL STATES: Chronic | ICD-10-CM

## 2020-08-07 DIAGNOSIS — K70.31 ALCOHOLIC CIRRHOSIS OF LIVER WITH ASCITES: ICD-10-CM

## 2020-08-07 LAB
ALBUMIN FLD-MCNC: <0.3 G/DL — SIGNIFICANT CHANGE UP
B PERT IGG+IGM PNL SER: ABNORMAL
COLOR FLD: YELLOW — SIGNIFICANT CHANGE UP
FLUID INTAKE SUBSTANCE CLASS: SIGNIFICANT CHANGE UP
FLUID SEGMENTED GRANULOCYTES: 0 % — SIGNIFICANT CHANGE UP
GLUCOSE FLD-MCNC: 121 MG/DL — SIGNIFICANT CHANGE UP
LDH SERPL L TO P-CCNC: 46 U/L — SIGNIFICANT CHANGE UP
LYMPHOCYTES # FLD: 82 % — SIGNIFICANT CHANGE UP
MESOTHL CELL # FLD: 13 % — SIGNIFICANT CHANGE UP
MONOS+MACROS # FLD: 5 % — SIGNIFICANT CHANGE UP
PROT FLD-MCNC: <0.6 G/DL — SIGNIFICANT CHANGE UP
RCV VOL RI: 980 /UL — HIGH (ref 0–0)
TOTAL NUCLEATED CELL COUNT, BODY FLUID: 3 /UL — SIGNIFICANT CHANGE UP
TUBE TYPE: SIGNIFICANT CHANGE UP

## 2020-08-07 PROCEDURE — 82042 OTHER SOURCE ALBUMIN QUAN EA: CPT

## 2020-08-07 PROCEDURE — 49083 ABD PARACENTESIS W/IMAGING: CPT

## 2020-08-07 PROCEDURE — 89051 BODY FLUID CELL COUNT: CPT

## 2020-08-07 PROCEDURE — C1729: CPT

## 2020-08-07 PROCEDURE — 83615 LACTATE (LD) (LDH) ENZYME: CPT

## 2020-08-07 PROCEDURE — 82945 GLUCOSE OTHER FLUID: CPT

## 2020-08-07 PROCEDURE — 84157 ASSAY OF PROTEIN OTHER: CPT

## 2020-08-08 ENCOUNTER — APPOINTMENT (OUTPATIENT)
Dept: DISASTER EMERGENCY | Facility: CLINIC | Age: 50
End: 2020-08-08

## 2020-08-08 LAB — SARS-COV-2 N GENE NPH QL NAA+PROBE: NOT DETECTED

## 2020-08-10 ENCOUNTER — EMERGENCY (EMERGENCY)
Facility: HOSPITAL | Age: 50
LOS: 1 days | Discharge: HOME | End: 2020-08-10
Attending: STUDENT IN AN ORGANIZED HEALTH CARE EDUCATION/TRAINING PROGRAM | Admitting: STUDENT IN AN ORGANIZED HEALTH CARE EDUCATION/TRAINING PROGRAM
Payer: MEDICAID

## 2020-08-10 ENCOUNTER — APPOINTMENT (OUTPATIENT)
Dept: HEPATOLOGY | Facility: HOSPITAL | Age: 50
End: 2020-08-10

## 2020-08-10 VITALS
HEART RATE: 64 BPM | TEMPERATURE: 98 F | SYSTOLIC BLOOD PRESSURE: 109 MMHG | DIASTOLIC BLOOD PRESSURE: 58 MMHG | RESPIRATION RATE: 18 BRPM | OXYGEN SATURATION: 100 %

## 2020-08-10 DIAGNOSIS — Z98.890 OTHER SPECIFIED POSTPROCEDURAL STATES: Chronic | ICD-10-CM

## 2020-08-10 PROCEDURE — 99283 EMERGENCY DEPT VISIT LOW MDM: CPT

## 2020-08-10 RX ORDER — TETANUS TOXOID, REDUCED DIPHTHERIA TOXOID AND ACELLULAR PERTUSSIS VACCINE, ADSORBED 5; 2.5; 8; 8; 2.5 [IU]/.5ML; [IU]/.5ML; UG/.5ML; UG/.5ML; UG/.5ML
0.5 SUSPENSION INTRAMUSCULAR ONCE
Refills: 0 | Status: DISCONTINUED | OUTPATIENT
Start: 2020-08-10 | End: 2020-08-10

## 2020-08-10 RX ORDER — TETANUS TOXOID, REDUCED DIPHTHERIA TOXOID AND ACELLULAR PERTUSSIS VACCINE, ADSORBED 5; 2.5; 8; 8; 2.5 [IU]/.5ML; [IU]/.5ML; UG/.5ML; UG/.5ML; UG/.5ML
0.5 SUSPENSION INTRAMUSCULAR ONCE
Refills: 0 | Status: COMPLETED | OUTPATIENT
Start: 2020-08-10 | End: 2020-08-10

## 2020-08-10 RX ADMIN — TETANUS TOXOID, REDUCED DIPHTHERIA TOXOID AND ACELLULAR PERTUSSIS VACCINE, ADSORBED 0.5 MILLILITER(S): 5; 2.5; 8; 8; 2.5 SUSPENSION INTRAMUSCULAR at 14:42

## 2020-08-10 NOTE — ED PROVIDER NOTE - OBJECTIVE STATEMENT
50M h/o alcohol dependence, liver cirrhosis, esophageal varices s/p banding in 2017, ascites s/p paracentesis 1 year ago, IBS p/w lac to R leg. Pt walked into tool bag during blackout, fell onto handle of screw . Initially did not seek medical attention thinking the bleeding would stop on it's own though had gone for EGD today and they sent to ED for wound assessment. Denies fever/chills. No other injuries. No falls, head trauma or loc.

## 2020-08-10 NOTE — ED PROVIDER NOTE - PATIENT PORTAL LINK FT
You can access the FollowMyHealth Patient Portal offered by [facility] by registering at the following website: [email or web address]. By joining [facility]’s FollowMyHealth portal, you will also be able to view your health information using other applications (apps) compatible with our system.

## 2020-08-10 NOTE — ED PROVIDER NOTE - CLINICAL SUMMARY MEDICAL DECISION MAKING FREE TEXT BOX
49 y/o M with a PMHx of alcohol dependence, liver cirrhosis, esophageal varices s/p banding in 2017, ascites s/p paracentesis 1 year ago, IBS p/w lac to R lateral calf x5 days - cannot suture closed due to duration of wound, will place surgicell, pressure dress, wound care follow-up
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
## 2020-08-10 NOTE — ED ADULT TRIAGE NOTE - NS ED NURSE BANDS TYPE
Patient Instructions by Bianca Alvarado CMA at 08/01/17 04:15 PM     Author:  Bianca Alvarado CMA Service:  (none) Author Type:  Certified Medical Assistant     Filed:  08/01/17 04:16 PM Encounter Date:  8/1/2017 Status:  Signed     :  Bianca Alvarado CMA (Certified Medical Assistant)            PATIENT INFORMATION   We recommend:   Pregancy Ultrasound Instructions: Obstetrical (OB) Ultrasound    About the Exam  Today you have been scheduled for an Obstetrical (OB) Ultrasound. An ultrasound uses sound waves to create images of the fetus. Ultrasounds are not known to be harmful; although, as with other diagnostic tests, an ultrasound should only be performed as recommended by your doctor. Your ultrasound will be performed by a sonographer specially trained in ultrasound procedures. During the ultrasound, you will be asked to lie down on a table. Gel will be placed on your abdomen to increase the conduction of sound. The technologist will use a small, hand-held device called a transducer, which is moved around to scan the fetus. You may be asked to lie on either side during the exam. There are [id number] images the sonographer must obtain during the screening exam. Early pregnancy or follow-up ultrasounds may require fewer images. The sonographer will point out fetal structures. However, there are times that the   sonographer must concentrate to get exact measurements and may not be able to answer questions during this time.    Please allow up to 60 minutes for this exam, although the time may vary. When the sonographer is finished scanning, a radiologist will check the images before you leave. A radiologist is a physician who specializes in the diagnosis of disease by the use of imaging technology. The radiologist interprets the images and decides if anything further is needed.     Reasons for Uses of Ultrasound in Pregnancy  • Diagnosis and assessment of early pregnancy   • Establish viability of  pregnancy in threatened miscarriage   • Gestational age determination/fetal size assessment   • Diagnosis of multiple gestation   • Evaluation of amniotic fluid and/or placenta   • Assessment of fetal malformation   • Confirmation of fetal position   • Diagnosis of uterine and pelvic abnormalities     How to get Results  The ultrasound results will be sent to your physician. To obtain your results, please follow-up with your doctor.    Videotaping or Photography  We regret that we are unable to accommodate any videotaping or photography at any time while in the Imaging Suites. However, we will provide you with an ultrasound image for you to take home.     Gender of Fetus  The sonographer does not look for or comment on the gender of the fetus unless asked to do so by the patient. There are many times that the gender cannot be determined due to the position of the fetus. In addition, an ultrasound is not 100% accurate in determining the gender of the fetus, especially in early pregnancy.     Family Members  Due to the small room size and the complexity of the OB ultrasounds, only one other person can be accommodated during the exam. Upon completion of the exam, other family members will be invited into the room to review the images. Children that need supervision must be accompanied by another adult due to safety concerns.     First Trimester Only:        Preparation for your Exam   Please arrive 10 minutes early for your appointment. Your bladder must be full for this exam. The bladder is used as a window to visualize the birth canal, placenta, and fetal head. If the bladder is not full, the sonographer may not be able to visualize these structures and you may be asked to reschedule.     You will need to drink at least 32 ounces of liquids starting one and one half hours (1-1/2 hours) before your appointment. Liquids can be water, pop/soda, juice, coffee, or tea. Finish the liquid 1 hour before your appointment  time. Once you begin drinking, do not use the restroom.     Start drinking at:    (Fill in time 1 hr and 30 minutes before appointment)     Finish drinking before:    (Fill in time 1 hr before appointment)     If you need to make or cancel your appointment, please call us at [phone] as soon as possible and we will be happy to reschedule your exam for you. If you have any questions concerning the preparation for this exam, please call us and we will be glad to answer them for you.     APPOINTMENT:   DAY_____________ MONTH_____________ DATE_____________ TIME_____________     YOUR APPOINTMENT IS AT THE LOCATION INDICATED BY A CHECK LYNDON:     [facility]  [address]  [address] [place] [address]    Please follow up:  Please contact our office if you have any vaginal bleeding or abdominal pains in the 3rd trimester.    Prior to 35 weeks, if you have regular, painful cramps more frequent than 4-6 times in 1 hour, please call our office promptly.     If you are leaking thin, clear fluid from your vagina, please contact our office promptly.    Please monitor your baby's activity; if you are concerned it is decreased, please do Fetal Kick Counts. Instructions are in your yellow 28 week folder. Call our office if your baby does not meet criteria for reassuring movement.    If you develop a severe headache and/or right sided abdominal pain, blurred vision and sudden increase in swelling, please contact our office immediately.    Please review the yellow 28 week folder for information on hospital registration and disability/FMLA forms.    It's not too early to start planning for breastfeeding. Breastfeeding support is available to you before or after delivery in the OB/GYN department at the [facility]. Our certified lactation counselors are available Monday through Friday. Please call [phone] for any questions or to schedule an  Name band; appointment.    LABOR INSTRUCTIONS  Towards the end of your pregnancy at term you may start to lose your mucous plug.  This is a thick, stringy, mucous discharge that means you cervix is preparing for labor.  This does not require you to call the office or to be seen.      After 35 weeks if this is your 1st baby you should call the office if it is open or go to the hospital when your contractions are 5 minutes apart, for 1 hour.  These contractions will be painful either in your lower abdomen or lower back and you won’t be able to walk or talk through them.   and As a pregnant patient of [facility], today you are being referred to the [facility] Maternity Support Program. This program is designed to assist you with your educational needs during and after your pregnancy. You will be working closely with your personal care manager who will provide additional support and resources with the goal for a healthy pregnancy, and healthy baby.      Please refer to your Healthy [facility] Maternity Support Program brochure that you have also received today, for more information.       If you have any of the concerns or complications after regular business hours or on the weekend, please contact our office at [phone] and the On-call doctor will assist you.    Thank you for allowing us to serve you today!    Additional Educational Resources:  For additional resources regarding your symptoms, diagnosis, or further health information, please visit the Health Resources section on [email or web address] or the Online Health Resources section in [facility].        Revision History        User Key Date/Time User Provider Type Action    > [N/A] 08/01/17 04:16 PM Bianca Alvarado CMA Certified Medical Assistant Sign

## 2020-08-10 NOTE — ED PROVIDER NOTE - CARE PROVIDER_API CALL
Marjorie Pretty  SURGERY  [address] [place] [address]  Phone: [phone]  Fax: [phone]  Follow Up Time:

## 2020-08-10 NOTE — ED PROVIDER NOTE - FAMILY HISTORY
Father  Still living? Unknown  FH: lung cancer, Age at diagnosis: Age Unknown     Mother  Still living? Unknown  FHx: COPD (chronic obstructive pulmonary disease), Age at diagnosis: Age Unknown

## 2020-08-10 NOTE — ED PROVIDER NOTE - NSFOLLOWUPINSTRUCTIONS_ED_ALL_ED_FT
Please follow up with your primary care doctor in 2-3 days  Follow-up with wound care at next available appointment  Complete full course of bactrim as prescribed (2 tablets every 12 hours)  Return to ED if you experience redness/warmth to area, pus discharge from area, fever/chills or anything else concerning to you

## 2020-08-12 LAB
CULTURE RESULTS: SIGNIFICANT CHANGE UP
SPECIMEN SOURCE: SIGNIFICANT CHANGE UP

## 2020-08-17 DIAGNOSIS — R18.8 OTHER ASCITES: ICD-10-CM

## 2020-08-19 LAB
CULTURE RESULTS: SIGNIFICANT CHANGE UP
SPECIMEN SOURCE: SIGNIFICANT CHANGE UP

## 2020-08-21 ENCOUNTER — OUTPATIENT (OUTPATIENT)
Dept: OUTPATIENT SERVICES | Facility: HOSPITAL | Age: 50
LOS: 1 days | End: 2020-08-21
Payer: COMMERCIAL

## 2020-08-21 ENCOUNTER — APPOINTMENT (OUTPATIENT)
Dept: DISASTER EMERGENCY | Facility: CLINIC | Age: 50
End: 2020-08-21

## 2020-08-21 DIAGNOSIS — Z98.890 OTHER SPECIFIED POSTPROCEDURAL STATES: Chronic | ICD-10-CM

## 2020-08-21 DIAGNOSIS — Z11.59 ENCOUNTER FOR SCREENING FOR OTHER VIRAL DISEASES: ICD-10-CM

## 2020-08-21 PROCEDURE — U0003: CPT

## 2020-08-22 LAB — SARS-COV-2 RNA SPEC QL NAA+PROBE: SIGNIFICANT CHANGE UP

## 2020-08-24 ENCOUNTER — APPOINTMENT (OUTPATIENT)
Dept: HEPATOLOGY | Facility: HOSPITAL | Age: 50
End: 2020-08-24

## 2020-08-24 ENCOUNTER — OUTPATIENT (OUTPATIENT)
Dept: OUTPATIENT SERVICES | Facility: HOSPITAL | Age: 50
LOS: 1 days | Discharge: HOME | End: 2020-08-24
Payer: MEDICAID

## 2020-08-24 ENCOUNTER — OUTPATIENT (OUTPATIENT)
Dept: OUTPATIENT SERVICES | Facility: HOSPITAL | Age: 50
LOS: 1 days | End: 2020-08-24
Payer: COMMERCIAL

## 2020-08-24 ENCOUNTER — RESULT REVIEW (OUTPATIENT)
Age: 50
End: 2020-08-24

## 2020-08-24 VITALS
TEMPERATURE: 99 F | WEIGHT: 190.04 LBS | OXYGEN SATURATION: 99 % | DIASTOLIC BLOOD PRESSURE: 69 MMHG | SYSTOLIC BLOOD PRESSURE: 122 MMHG | RESPIRATION RATE: 16 BRPM | HEART RATE: 71 BPM

## 2020-08-24 VITALS
RESPIRATION RATE: 16 BRPM | DIASTOLIC BLOOD PRESSURE: 60 MMHG | SYSTOLIC BLOOD PRESSURE: 110 MMHG | HEART RATE: 72 BPM | OXYGEN SATURATION: 100 %

## 2020-08-24 VITALS
WEIGHT: 184.97 LBS | OXYGEN SATURATION: 100 % | RESPIRATION RATE: 15 BRPM | DIASTOLIC BLOOD PRESSURE: 57 MMHG | SYSTOLIC BLOOD PRESSURE: 104 MMHG | TEMPERATURE: 97 F | HEART RATE: 71 BPM | HEIGHT: 68 IN

## 2020-08-24 DIAGNOSIS — Z98.890 OTHER SPECIFIED POSTPROCEDURAL STATES: Chronic | ICD-10-CM

## 2020-08-24 DIAGNOSIS — K70.31 ALCOHOLIC CIRRHOSIS OF LIVER WITH ASCITES: ICD-10-CM

## 2020-08-24 LAB
ALBUMIN FLD-MCNC: 0.3 G/DL — SIGNIFICANT CHANGE UP
B PERT IGG+IGM PNL SER: ABNORMAL
COLOR FLD: SIGNIFICANT CHANGE UP
FLUID INTAKE SUBSTANCE CLASS: SIGNIFICANT CHANGE UP
FLUID SEGMENTED GRANULOCYTES: 1 % — SIGNIFICANT CHANGE UP
GLUCOSE FLD-MCNC: 102 MG/DL — SIGNIFICANT CHANGE UP
LDH SERPL L TO P-CCNC: 59 U/L — SIGNIFICANT CHANGE UP
LYMPHOCYTES # FLD: 81 % — SIGNIFICANT CHANGE UP
MESOTHL CELL # FLD: 11 % — SIGNIFICANT CHANGE UP
MONOS+MACROS # FLD: 7 % — SIGNIFICANT CHANGE UP
PROT FLD-MCNC: 1 G/DL — SIGNIFICANT CHANGE UP
RCV VOL RI: 1550 /UL — HIGH (ref 0–0)
TOTAL NUCLEATED CELL COUNT, BODY FLUID: 30 /UL — SIGNIFICANT CHANGE UP
TUBE TYPE: SIGNIFICANT CHANGE UP

## 2020-08-24 PROCEDURE — 84157 ASSAY OF PROTEIN OTHER: CPT

## 2020-08-24 PROCEDURE — 83615 LACTATE (LD) (LDH) ENZYME: CPT

## 2020-08-24 PROCEDURE — 82945 GLUCOSE OTHER FLUID: CPT

## 2020-08-24 PROCEDURE — C1729: CPT

## 2020-08-24 PROCEDURE — 89051 BODY FLUID CELL COUNT: CPT

## 2020-08-24 PROCEDURE — 49083 ABD PARACENTESIS W/IMAGING: CPT

## 2020-08-24 PROCEDURE — 82042 OTHER SOURCE ALBUMIN QUAN EA: CPT

## 2020-08-24 PROCEDURE — 43235 EGD DIAGNOSTIC BRUSH WASH: CPT

## 2020-08-24 PROCEDURE — P9047: CPT

## 2020-08-24 RX ORDER — SODIUM CHLORIDE 9 MG/ML
1000 INJECTION, SOLUTION INTRAVENOUS
Refills: 0 | Status: DISCONTINUED | OUTPATIENT
Start: 2020-08-24 | End: 2020-09-08

## 2020-08-24 RX ADMIN — SODIUM CHLORIDE 30 MILLILITER(S): 9 INJECTION, SOLUTION INTRAVENOUS at 13:19

## 2020-08-24 NOTE — ASU PATIENT PROFILE, ADULT - ABILITY TO HEAR (WITH HEARING AID OR HEARING APPLIANCE IF NORMALLY USED):
From: Damari Rivera  To: Dufm Curling, MD  Sent: 1/24/2018 9:53 AM EST  Subject: Prescription Question    When I was in for my annual checkup, you provided me with the names of three medications that can be used for control of incontinence. I cannot find the information and I am hoping you can provide me with that information again. Thanks,     Danis Wilde.  Kerry Adequate: hears normal conversation without difficulty

## 2020-08-24 NOTE — PROCEDURE NOTE - NSPROCDETAILS_GEN_ALL_CORE
ultrasound utilization/Seldinger technique/sterile dressing applied/location identified, sterile technique used, catheter introduced, fluid drained

## 2020-08-24 NOTE — ASU DISCHARGE PLAN (ADULT/PEDIATRIC) - ASU DC SPECIAL INSTRUCTIONSFT
Please feel free to contact us at [phone] if any problems arise. After 6PM, Monday through Friday, on weekends and on holidays, please call [phone] and ask for the radiology resident on call to be paged.

## 2020-08-24 NOTE — ASU DISCHARGE PLAN (ADULT/PEDIATRIC) - CALL YOUR DOCTOR IF YOU HAVE ANY OF THE FOLLOWING:
Inability to tolerate liquids or foods/Swelling that gets worse/Bleeding that does not stop/Fever greater than (need to indicate Fahrenheit or Celsius)

## 2020-08-24 NOTE — PRE PROCEDURE NOTE - PRE PROCEDURE EVALUATION
Interventional Radiology Pre-Procedure Note    This is a 50y Male w/ hx of alcoholic cirrhosis with recurrent ascites referred to IR for image guided therapeutic paracentesis.      PAST MEDICAL & SURGICAL HISTORY:  IBS (irritable bowel syndrome)  HTN (hypertension)  Ascites due to alcoholic cirrhosis  Cirrhosis of liver  ETOH abuse  Esophageal varices with bleedin2017  History of ankle surgery     Vital Signs Last 24 Hrs  T(C): 37 (24 Aug 2020 08:22), Max: 37 (24 Aug 2020 08:22)  T(F): 98.6 (24 Aug 2020 08:22), Max: 98.6 (24 Aug 2020 08:22)  HR: 71 (24 Aug 2020 08:22) (71 - 71)  BP: 122/69 (24 Aug 2020 08:22) (122/69 - 122/69)  RR: 16 (24 Aug 2020 08:22) (16 - 16)  SpO2: 99% (24 Aug 2020 08:22) (99% - 99%)    Allergies: penicillin (Unknown)    Physical Exam: GEN: NAD; A&O x3    Labs: refer to [facility] labs      Informed consent obtained. All questions and concerns have been addressed at this time.     Plan: Image guided therapeutic Paracentesis

## 2020-08-24 NOTE — PROCEDURE NOTE - NSINFORMCONSENT_GEN_A_CORE
Benefits, risks, and possible complications of procedure explained to patient/caregiver who verbalized understanding and gave written consent.
no

## 2020-09-01 ENCOUNTER — APPOINTMENT (OUTPATIENT)
Dept: HEPATOLOGY | Facility: CLINIC | Age: 50
End: 2020-09-01
Payer: MEDICAID

## 2020-09-01 ENCOUNTER — LABORATORY RESULT (OUTPATIENT)
Age: 50
End: 2020-09-01

## 2020-09-01 VITALS
WEIGHT: 213 LBS | HEIGHT: 66 IN | BODY MASS INDEX: 34.23 KG/M2 | HEART RATE: 81 BPM | RESPIRATION RATE: 16 BRPM | DIASTOLIC BLOOD PRESSURE: 86 MMHG | TEMPERATURE: 97.9 F | SYSTOLIC BLOOD PRESSURE: 155 MMHG

## 2020-09-01 DIAGNOSIS — R18.8 OTHER ASCITES: ICD-10-CM

## 2020-09-01 DIAGNOSIS — C22.0 LIVER CELL CARCINOMA: ICD-10-CM

## 2020-09-01 DIAGNOSIS — K70.11 ALCOHOLIC HEPATITIS WITH ASCITES: ICD-10-CM

## 2020-09-01 DIAGNOSIS — K70.31 ALCOHOLIC CIRRHOSIS OF LIVER WITH ASCITES: ICD-10-CM

## 2020-09-01 DIAGNOSIS — I85.11 SECONDARY ESOPHAGEAL VARICES WITH BLEEDING: ICD-10-CM

## 2020-09-01 DIAGNOSIS — R60.9 EDEMA, UNSPECIFIED: ICD-10-CM

## 2020-09-01 DIAGNOSIS — K72.90 HEPATIC FAILURE, UNSPECIFIED W/OUT COMA: ICD-10-CM

## 2020-09-01 PROCEDURE — 99215 OFFICE O/P EST HI 40 MIN: CPT

## 2020-09-01 RX ORDER — LACTULOSE 10 G/15ML
10 SOLUTION ORAL 4 TIMES DAILY
Qty: 10800 | Refills: 1 | Status: ACTIVE | COMMUNITY
Start: 2020-07-23 | End: 1900-01-01
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
## 2020-09-01 NOTE — REVIEW OF SYSTEMS
[As Noted in HPI] : as noted in HPI [Easy Bruising] : a tendency for easy bruising [Negative] : Heme/Lymph [Recent Weight Gain (___ Lbs)] : recent [unfilled] ~Ulb weight gain [As noted in HPI] : as noted in HPI [Lower Ext Edema] : lower extremity edema

## 2020-09-01 NOTE — CONSULT LETTER
[Dear  ___] : Dear  [unfilled], [Courtesy Letter:] : I had the pleasure of seeing your patient, [unfilled], in my office today. [Please see my note below.] : Please see my note below. [Consult Closing:] : Thank you very much for allowing me to participate in the care of this patient.  If you have any questions, please do not hesitate to contact me. [FreeTextEntry2] : Dr. Breezy Maza [FreeTextEntry3] : Sincerely,\par \par Edy Drew M.D., Ph.D.\par  of Medicine\par [facility]\par [address] [place] [address]\[place] [address]\par Tel: [phone]\par Fax: [phone]\par Cell: [phone]\par E-mail: [email or web address]\par

## 2020-09-01 NOTE — ASSESSMENT
[FreeTextEntry1] : 51 yo M with severe AUD, history of biopsy-proven alcoholic hepatitis (3/2019), recurrent probable alcoholic hepatitis (7/2020), and decompensated alcohol-related cirrhosis complicated by refractory ascites, peripheral edema,  esophageal variceal hemorrhage (7/2017), hepatic encephalopathy, and hyponatremia, with recent acute on chronic liver failure (ACLF) due to alcohol relapse, also with recently-diagnosed HCC.\par \par # Refractory ascites and mild peripheral edema:\par - Previously had low BPs that limited diuretic dosing while inpatient, but BPs have improved and in fact appears to be somewhat hypertensive today.\par - Will re-check BMP with labs today and increase diuretics as tolerated.\par - Pending labs, will continue furosemide 40 mg po daily and spironolactone 150 mg po daily.\par - Continue ciprofloxacin 500 mg po daily for primary SBP prophylaxis.\par - Continue periodic LVPs as needed with 1:1 albumin replacement, ordered today.\par - Mr. AGUILAR was counseled to adhere to a low sodium (<2 grams of sodium per day) diet by: avoiding adding any salt to meals (removing the salt shaker from the table); eliminating salty foods from his diet; eating more home-cooked meals; choosing fresh or frozen, not canned, vegetables and fruits; and reading ingredient and food labels to choose low sodium foods.\par \par # History of secondary esophageal varices with bleeding:\par - No recent overt bleeding.\par - Last EGD on 8/24/20 with small EV and PHG, but cannot exclude gastric varices as retroflexion was not performed due to retained food. Therefore, will plan for repeat EGD in 6 months for re-evaluation.\par - Propranolol (for secondary prophylaxis) was previously discontinued due to hypotension, which appears to have resolved, but will hold off on resuming NSBB therapy for now given refractory ascites and concern that NSBB may increase risk of circulatory dysfunction and OSMEL.\par - Defer colonoscopy (for age-appropriate colon cancer screening) for now given end-stage liver disease, as risks currently outweigh benefits.\par \par # Hepatic encephalopathy:\par - Had severe (grade 3) HE while hospitalized with ACLF, but currently appears to be well-controlled since discharge.\par - Continue rifaximin 550 mg po bid.\par - Continue lactulose, and previously discussed how to titrate to maintain 3-4 BMs/day.\par \par # HCC:\par - MRI on 7/20/20 with 1.3 cm HCC (LR-5) in S5/8, visible on US and potentially amenable to ablation or embolization.\par - He is not a surgical resection candidate due to his advanced cirrhosis and portal hypertension.\par - He is not currently a liver transplant candidate due to his multiple alcohol relapses despite known advanced liver disease.\par - Will plan to refer to IR for liver-directed therapy once his liver function improves with longer duration of alcohol abstinence.\par \par # Decompensated ALD cirrhosis:\par - Child-Lemus class C, with MELD-Na 30 based on his labs from 7/29/20. Will re-check labs today.\par - He is not currently a liver transplant candidate given multiple alcohol relapses including recently, despite known advanced liver disease. He will need to complete an alcohol rehab program and achieve a longer duration of sobriety, at a minimum, in order for his candidacy to be re-considered.\par \par # Severe AUD, with recent alcohol relapse:\par - Last reported drink on 7/9/20 (blood alcohol 30 on 7/10/20).\par - Urine ETG was negative on 7/29/20. Continue biomarker surveillance.\par - He was encouraged to continue to pursue enrollment in an outpatient alcohol rehabilitation program. I advised him to reach out to me if they are unable to accept him, for additional resources.\par - Continue MAT with baclofen 10 mg po tid.\par \par # Health maintenance:\par - Mr. AGUILAR was counseled to: abstain from alcohol and all illicit drugs; avoid use of herbal and dietary supplements due to potential hepatotoxicity; limit use of acetaminophen to <2 grams per day; avoid use of nonsteroidal antiinflammatory drugs (NSAIDs) as these can precipitate renal dysfunction in patients with advanced liver disease; avoid eating any unpasteurized dairy products; and avoid eating raw/steamed oysters or other shellfish to avoid risk of Vibrio infection.\par - Immune to HAV and HBV.\par - Continue ergocalciferol for vitamin D deficiency.\par - Continue zinc sulfate for deficiency and magnesium oxide for deficiency.\par - Continue folic acid, thiamine, and MVI supplementation.\par \par He will return for follow-up in 1 month.

## 2020-09-02 DIAGNOSIS — E87.1 HYPO-OSMOLALITY AND HYPONATREMIA: ICD-10-CM

## 2020-09-02 LAB
AFP-TM SERPL-MCNC: 2.9 NG/ML
ALBUMIN SERPL ELPH-MCNC: 3.3 G/DL
ALP BLD-CCNC: 127 U/L
ALT SERPL-CCNC: 44 U/L
ANION GAP SERPL CALC-SCNC: 12 MMOL/L
AST SERPL-CCNC: 73 U/L
BASOPHILS # BLD AUTO: 0.11 K/UL
BASOPHILS NFR BLD AUTO: 1 %
BILIRUB DIRECT SERPL-MCNC: 7.8 MG/DL
BILIRUB SERPL-MCNC: 15.8 MG/DL
BUN SERPL-MCNC: 17 MG/DL
CALCIUM SERPL-MCNC: 8.8 MG/DL
CHLORIDE SERPL-SCNC: 96 MMOL/L
CO2 SERPL-SCNC: 19 MMOL/L
CREAT SERPL-MCNC: 0.76 MG/DL
EOSINOPHIL # BLD AUTO: 0.11 K/UL
EOSINOPHIL NFR BLD AUTO: 1 %
ETHYL GLUCURONIDE UR QL SCN: NEGATIVE
GLUCOSE SERPL-MCNC: 108 MG/DL
HCT VFR BLD CALC: 29.1 %
HGB BLD-MCNC: 9.8 G/DL
INR PPP: 2.79 RATIO
LYMPHOCYTES # BLD AUTO: 1.9 K/UL
LYMPHOCYTES NFR BLD AUTO: 18 %
MAN DIFF?: NORMAL
MCHC RBC-ENTMCNC: 33.7 GM/DL
MCHC RBC-ENTMCNC: 37.3 PG
MCV RBC AUTO: 110.6 FL
MONOCYTES # BLD AUTO: 1.06 K/UL
MONOCYTES NFR BLD AUTO: 10 %
NEUTROPHILS # BLD AUTO: 6.98 K/UL
NEUTROPHILS NFR BLD AUTO: 66 %
PLATELET # BLD AUTO: 95 K/UL
POTASSIUM SERPL-SCNC: 4.4 MMOL/L
PROT SERPL-MCNC: 6.7 G/DL
PT BLD: 31.3 SEC
RBC # BLD: 2.63 M/UL
RBC # FLD: 15 %
SODIUM SERPL-SCNC: 127 MMOL/L
WBC # FLD AUTO: 10.57 K/UL

## 2020-09-05 ENCOUNTER — OUTPATIENT (OUTPATIENT)
Dept: OUTPATIENT SERVICES | Facility: HOSPITAL | Age: 50
LOS: 1 days | End: 2020-09-05
Payer: COMMERCIAL

## 2020-09-05 DIAGNOSIS — Z98.890 OTHER SPECIFIED POSTPROCEDURAL STATES: Chronic | ICD-10-CM

## 2020-09-05 DIAGNOSIS — Z11.59 ENCOUNTER FOR SCREENING FOR OTHER VIRAL DISEASES: ICD-10-CM

## 2020-09-05 LAB — SARS-COV-2 RNA SPEC QL NAA+PROBE: SIGNIFICANT CHANGE UP

## 2020-09-05 PROCEDURE — U0003: CPT

## 2020-09-08 ENCOUNTER — OUTPATIENT (OUTPATIENT)
Dept: OUTPATIENT SERVICES | Facility: HOSPITAL | Age: 50
LOS: 1 days | End: 2020-09-08
Payer: COMMERCIAL

## 2020-09-08 ENCOUNTER — RESULT REVIEW (OUTPATIENT)
Age: 50
End: 2020-09-08

## 2020-09-08 DIAGNOSIS — Z11.59 ENCOUNTER FOR SCREENING FOR OTHER VIRAL DISEASES: ICD-10-CM

## 2020-09-08 DIAGNOSIS — K70.31 ALCOHOLIC CIRRHOSIS OF LIVER WITH ASCITES: ICD-10-CM

## 2020-09-08 DIAGNOSIS — Z98.890 OTHER SPECIFIED POSTPROCEDURAL STATES: Chronic | ICD-10-CM

## 2020-09-08 LAB
B PERT IGG+IGM PNL SER: ABNORMAL
COLOR FLD: YELLOW — SIGNIFICANT CHANGE UP
FLUID INTAKE SUBSTANCE CLASS: SIGNIFICANT CHANGE UP
FLUID SEGMENTED GRANULOCYTES: 0 % — SIGNIFICANT CHANGE UP
LYMPHOCYTES # FLD: 52 % — SIGNIFICANT CHANGE UP
MESOTHL CELL # FLD: 19 % — SIGNIFICANT CHANGE UP
MONOS+MACROS # FLD: 29 % — SIGNIFICANT CHANGE UP
RCV VOL RI: 1700 /UL — HIGH (ref 0–0)
TOTAL NUCLEATED CELL COUNT, BODY FLUID: 30 /UL — SIGNIFICANT CHANGE UP
TUBE TYPE: SIGNIFICANT CHANGE UP

## 2020-09-08 PROCEDURE — 49083 ABD PARACENTESIS W/IMAGING: CPT

## 2020-09-08 PROCEDURE — 89051 BODY FLUID CELL COUNT: CPT

## 2020-09-08 PROCEDURE — C1729: CPT

## 2020-09-08 RX ORDER — SPIRONOLACTONE 25 MG/1
1.5 TABLET, FILM COATED ORAL
Qty: 0 | Refills: 0 | DISCHARGE

## 2020-09-08 RX ORDER — CIPROFLOXACIN LACTATE 400MG/40ML
1 VIAL (ML) INTRAVENOUS
Qty: 0 | Refills: 0 | DISCHARGE

## 2020-09-08 RX ORDER — BACLOFEN 100 %
1 POWDER (GRAM) MISCELLANEOUS
Qty: 0 | Refills: 0 | DISCHARGE

## 2020-09-08 RX ORDER — ALBUTEROL 90 UG/1
2 AEROSOL, METERED ORAL
Qty: 0 | Refills: 0 | DISCHARGE
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
## 2020-09-08 NOTE — PRE PROCEDURE NOTE - PRE PROCEDURE EVALUATION
Interventional Radiology     50y Male with PMH Alcoholic liver cirrhosis with ascites referred to IR for therapeutic and diagnostic paracentesis.  IR requested to give 1:1 albumin 12.5 g/50 ml (25%).  Also requested to send specimen for cell count with differential.    PAST MEDICAL & SURGICAL HISTORY:  IBS (irritable bowel syndrome)  HTN (hypertension)  Ascites due to alcoholic cirrhosis  Cirrhosis of liver  ETOH abuse  Esophageal varices with bleedin2017  History of ankle surgery    Allergies  penicillin (Unknown)    Labs:          After risks, benefits, alternatives discussion pt verbalized understanding and gave informed consent.  Will plan for image-guided paracentesis.    BRYAN Elizalde  [facility] [address]  Ext [phone] Interventional Radiology     50y Male with PMH Alcoholic liver cirrhosis with ascites referred to IR for therapeutic and diagnostic paracentesis.  IR requested to give 1:1 albumin 12.5 g/50 ml (25%).  Also requested to send specimen for cell count with differential.    PAST MEDICAL & SURGICAL HISTORY:  IBS (irritable bowel syndrome)  HTN (hypertension)  Ascites due to alcoholic cirrhosis  Cirrhosis of liver  ETOH abuse  Esophageal varices with bleedin2017  History of ankle surgery    Allergies  penicillin (Unknown)    Labs:    20 WBC 10.57, hgb 9.8, Hct 29.1, 95    20 INR 2.79, PT 31.3    20 NA+ 127, K+ 4.4, Chlor 96, CO2 19, BUN 17, Creat 0.76, Gluc 108    COVID-19 PCR () NontDetec      After risks, benefits, alternatives discussion pt verbalized understanding and gave informed consent.  Will plan for image-guided paracentesis.    BRYAN Elizalde  [facility] [address]  Ext [phone]

## 2020-09-08 NOTE — ASU DISCHARGE PLAN (ADULT/PEDIATRIC) - CARE PROVIDER_API CALL
Edy Drew)  Gastroenterology; Internal Medicine  [address] [place] [address]  Phone: [phone]  Fax: [phone]  Follow Up Time:
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
## 2020-09-14 DIAGNOSIS — K70.31 ALCOHOLIC CIRRHOSIS OF LIVER WITH ASCITES: ICD-10-CM

## 2020-09-25 NOTE — ED PROVIDER NOTE - CPE EDP EYES NORM
----- Message from Leslie Gastelum MD sent at 9/25/2020 11:32 AM CDT -----  Please assist  Order placed for diagnostic mammogram  Has appointment Monday  Received page from radiology   Thank you  Order now in system     
Patient aware of approved referral.  
normal...

## 2020-10-20 ENCOUNTER — APPOINTMENT (OUTPATIENT)
Dept: HEPATOLOGY | Facility: CLINIC | Age: 50
End: 2020-10-20

## 2021-02-04 NOTE — ASU PATIENT PROFILE, ADULT - AS SC [facility]ADEN FRICTION
Patient presents to the ed with multiple episodes soft stools earlier today, followed by episode of chills, found to be tachycardic by her doctor in setting of recent extensive abd surgery for metastatic ovarian CA-patient on exam well appearing, wound appears to be healing well, abd is nontender, bp stable, mild dry mm. Plan for labs, hydration, cxr ua, ekg, eval for elec disturbance, organ dysfunction, uti/pna/covid swab, appears mildly dehdrated. Will reass.
(3) no apparent problem

## 2021-02-22 ENCOUNTER — APPOINTMENT (OUTPATIENT)
Dept: HEPATOLOGY | Facility: HOSPITAL | Age: 51
End: 2021-02-22

## 2021-08-17 NOTE — PROCEDURE NOTE - NSPROCNAME_GEN_A_CORE
Implemented All Universal Safety Interventions:  [place] to call system. Call bell, personal items and telephone within reach. Instruct patient to call for assistance. Room bathroom lighting operational. Non-slip footwear when patient is off stretcher. Physically safe environment: no spills, clutter or unnecessary equipment. Stretcher in lowest position, wheels locked, appropriate side rails in place. Paracentesis

## 2021-10-06 PROBLEM — I10 ESSENTIAL HYPERTENSION: Status: ACTIVE | Noted: 2019-02-26

## 2022-02-07 NOTE — PROGRESS NOTE ADULT - SUBJECTIVE AND OBJECTIVE BOX
CESD of 4 is perceived as no mental distress or depression at this time. FTND of 5 Indicates a high  level  of tobacco/nicotine dependency. Patient states he smokes 3 cpd. Pt's wife states he has not bought any cigarettes due to no money. Exhaled carbon monoxide level was 15 ppm per [facility].  Date/Time Patient Seen:  		  Referring MD:   Data Reviewed	       Patient is a 50y old  Male who presents with a chief complaint of alcohol withdrawal, elevated bilirubin (2020 17:36)      Subjective/HPI     PAST MEDICAL & SURGICAL HISTORY:  IBS (irritable bowel syndrome)  HTN (hypertension)  Ascites due to alcoholic cirrhosis  Cirrhosis of liver  ETOH abuse  Esophageal varices with bleedin2017  History of ankle surgery        Medication list         MEDICATIONS  (STANDING):  albumin human 25% IVPB 100 milliLiter(s) IV Intermittent once  amLODIPine   Tablet 10 milliGRAM(s) Oral daily  ergocalciferol 42609 Unit(s) Oral every week  famotidine    Tablet 20 milliGRAM(s) Oral two times a day  folic acid 1 milliGRAM(s) Oral daily  furosemide    Tablet 20 milliGRAM(s) Oral daily  magnesium oxide 400 milliGRAM(s) Oral daily  multivitamin 1 Tablet(s) Oral daily  prednisoLONE    3 mG/mL Solution (ORAPRED) 40 milliGRAM(s) Oral daily  propranolol 40 milliGRAM(s) Oral daily  rifAXIMin 550 milliGRAM(s) Oral two times a day  spironolactone 150 milliGRAM(s) Oral daily  thiamine 100 milliGRAM(s) Oral daily    MEDICATIONS  (PRN):  LORazepam   Injectable 2 milliGRAM(s) IV Push every 2 hours PRN Symptom-triggered: 2 point increase in CIWA -Ar score and a total score of 7 or LESS  LORazepam   Injectable 2 milliGRAM(s) IV Push every 1 hour PRN Symptom-triggered: each CIWA -Ar score 8 or GREATER  ondansetron Injectable 4 milliGRAM(s) IV Push every 6 hours PRN Nausea         Vitals log        ICU Vital Signs Last 24 Hrs  T(C): 36.8 (2020 04:27), Max: 37.1 (2020 18:35)  T(F): 98.2 (2020 04:27), Max: 98.7 (2020 18:35)  HR: 68 (2020 04:00) (56 - 74)  BP: 91/62 (2020 04:00) (73/35 - 126/96)  BP(mean): 72 (2020 04:00) (51 - 101)  ABP: --  ABP(mean): --  RR: 17 (2020 04:00) (10 - 25)  SpO2: 98% (2020 04:00) (90% - 98%)           Input and Output:  I&O's Detail    2020 07:01  -  2020 07:00  --------------------------------------------------------  IN:    Solution: 50 mL  Total IN: 50 mL    OUT:    Voided: 500 mL  Total OUT: 500 mL    Total NET: -450 mL          Lab Data                        7.2    6.32  )-----------( 52       ( 2020 06:10 )             21.1     07-11    132<L>  |  100  |  12  ----------------------------<  128<H>  3.8   |  24  |  1.20    Ca    8.0<L>      2020 16:24  Phos  2.5     07-11  Mg     2.5     07-12    TPro  6.5  /  Alb  1.6<L>  /  TBili  22.6<HH>  /  DBili  x   /  AST  147<H>  /  ALT  52  /  AlkPhos  96  07-11            Review of Systems	      Objective     Physical Examination  heart s1s2  lung dec BS  on RA  verbal        Pertinent Lab findings & Imaging      Annel:  NO   Adequate UO     I&O's Detail    2020 07:01  -  2020 07:00  --------------------------------------------------------  IN:    Solution: 50 mL  Total IN: 50 mL    OUT:    Voided: 500 mL  Total OUT: 500 mL    Total NET: -450 mL               Discussed with:     Cultures:	        Radiology

## 2023-02-05 NOTE — PATIENT PROFILE ADULT - PACKS PER DAY
2/4  Thoracic surgery will replace pigtail thoracostomy tube on right side  - Hold heparin gtt x4 hours, check coags after held   2/5  + airleak maintain LWS  daily xray  - care per CICU  - Plan d/w  Dr. EDMAR Pérez
15

## 2023-08-14 NOTE — H&P ADULT - PROBLEM SELECTOR PROBLEM 6

## 2023-09-07 NOTE — PROGRESS NOTE ADULT - PROBLEM SELECTOR PROBLEM 4
Electrolyte disturbance Electrodesiccation Text: The wound bed was treated with electrodesiccation after the biopsy was performed. Anemia

## 2023-10-31 NOTE — PROGRESS NOTE ADULT - SUBJECTIVE AND OBJECTIVE BOX
INTERVAL HPI/OVERNIGHT EVENTS:  pt seen and examined  interim events noted  lethargic in bed, sp ativan, but denies abd pain  per rn no gib no bm ON, taking po meds  ciwa ranging 6-20 ON    MEDICATIONS  (STANDING):  aztreonam  IVPB      aztreonam  IVPB 1000 milliGRAM(s) IV Intermittent every 8 hours  enoxaparin Injectable 40 milliGRAM(s) SubCutaneous daily  ergocalciferol 89389 Unit(s) Oral every week  famotidine    Tablet 20 milliGRAM(s) Oral two times a day  folic acid 1 milliGRAM(s) Oral daily  lactated ringers. 500 milliLiter(s) (100 mL/Hr) IV Continuous <Continuous>  lactulose Syrup 20 Gram(s) Oral four times a day  magnesium oxide 400 milliGRAM(s) Oral daily  multivitamin 1 Tablet(s) Oral daily  phytonadione   Solution 5 milliGRAM(s) Oral daily  prednisoLONE    3 mG/mL Solution (ORAPRED) 40 milliGRAM(s) Oral daily  propranolol 40 milliGRAM(s) Oral every 8 hours  rifAXIMin 550 milliGRAM(s) Oral two times a day  thiamine 100 milliGRAM(s) Oral daily    MEDICATIONS  (PRN):  LORazepam   Injectable 2 milliGRAM(s) IV Push every 2 hours PRN Symptom-triggered: 2 point increase in CIWA -Ar score and a total score of 7 or LESS  LORazepam   Injectable 2 milliGRAM(s) IV Push every 1 hour PRN Symptom-triggered: each CIWA -Ar score 8 or GREATER  ondansetron Injectable 4 milliGRAM(s) IV Push every 6 hours PRN Nausea      Allergies    penicillin (Unknown)    Intolerances        Review of Systems:    see above       Vital Signs Last 24 Hrs  T(C): 36.3 (2020 08:00), Max: 36.8 (2020 19:57)  T(F): 97.4 (2020 08:00), Max: 98.3 (2020 19:57)  HR: 50 (2020 10:00) (50 - 83)  BP: 120/58 (2020 10:00) (77/47 - 159/84)  BP(mean): 77 (2020 10:00) (57 - 114)  RR: 14 (2020 10:00) (10 - 33)  SpO2: 100% (2020 10:00) (91% - 100%)    PHYSICAL EXAM:      Constitutional: lying in bed  HEENT: ncat  Gastrointestinal: soft nt +dt  Extremities: mild edema  Vascular: + peripheral pulses  Neurological: lethargic but arousable  Skin: jaundiced, scattered ecchymosis      LABS:                        7.7    9.60  )-----------( 72       ( 2020 05:18 )             24.3     07-14    137  |  102  |  16  ----------------------------<  112<H>  4.1   |  28  |  1.10    Ca    8.4<L>      2020 05:18  Phos  4.3     07-14  Mg     2.2     07-14    TPro  6.4  /  Alb  1.9<L>  /  TBili  24.1<HH>  /  DBili  x   /  AST  98<H>  /  ALT  48  /  AlkPhos  101  07-14    PT/INR - ( 2020 05:17 )   PT: 40.0 sec;   INR: 3.64 ratio         PTT - ( 2020 05:17 )  PTT:50.9 sec  Urinalysis Basic - ( 2020 17:16 )    Color: Claudine / Appearance: Slightly Turbid / S.010 / pH: x  Gluc: x / Ketone: Negative  / Bili: Large / Urobili: 12   Blood: x / Protein: Negative / Nitrite: Positive   Leuk Esterase: Trace / RBC: 0-2 /HPF / WBC 3-5   Sq Epi: x / Non Sq Epi: Occasional / Bacteria: Few        RADIOLOGY & ADDITIONAL TESTS: FU On current medications

## 2023-12-22 NOTE — DISCHARGE NOTE PROVIDER - NSDCHOSPICE_GEN_A_CORE
Called the pt's mother to conduct a wellness check and to notify them that the clinic would be close for the next two Saturdays. Due to the patient attending every 1 or 2 sessions, I also discussed that over the next two weeks, they can collectively discuss their desire to continue therapy or change the frequency of sessions to ensure attendance.  
No

## 2024-04-17 NOTE — PROCEDURE NOTE - ATTENDING PROVIDER
Addended by: NUBIA JOHANSEN on: 4/17/2024 05:00 PM     Modules accepted: Orders     Dr. David Deleon

## 2024-07-23 NOTE — PRE PROCEDURE NOTE - PROCEDURE SERVICE
Vascular and Interventional Radiology You can access the FollowMyHealth Patient Portal offered by [facility] by registering at the following website: [email or web address]. By joining [employer]’s FollowMyHealth portal, you will also be able to view your health information using other applications (apps) compatible with our system.

## 2025-03-03 NOTE — ASU PATIENT PROFILE, ADULT - NS PRO MODE OF ARRIVAL
EP Care Update    Patent taken to the EP lab for EPS to evaluate the need for dcPPM implantation. EPS negative, therefore patient does not require device implantation.     Recommendations:  - Discharge with event monitor  - Safe to discharge from an EP perspective    EP will sign off. Please call with additional questions or concerns.     GASPER Azevedo  Cardiovascular Disease fellow, PGY-4  [facility]     wheelchair/walker to get around
